# Patient Record
Sex: MALE | Race: WHITE | Employment: OTHER | URBAN - METROPOLITAN AREA
[De-identification: names, ages, dates, MRNs, and addresses within clinical notes are randomized per-mention and may not be internally consistent; named-entity substitution may affect disease eponyms.]

---

## 2020-09-19 ENCOUNTER — HOSPITAL ENCOUNTER (OUTPATIENT)
Dept: GENERAL RADIOLOGY | Age: 80
Discharge: HOME | End: 2020-09-19

## 2020-09-19 ENCOUNTER — HOSPITAL ENCOUNTER (OUTPATIENT)
Age: 80
Discharge: HOME | End: 2020-09-19

## 2020-09-19 PROCEDURE — 71046 X-RAY EXAM CHEST 2 VIEWS: CPT

## 2020-09-25 ENCOUNTER — HOSPITAL ENCOUNTER (OUTPATIENT)
Dept: CT IMAGING | Age: 80
Discharge: HOME | End: 2020-09-25
Payer: COMMERCIAL

## 2020-09-25 LAB
GFR AFRICAN AMERICAN: >60
GFR NON-AFRICAN AMERICAN: >60
PERFORMED ON: NORMAL
POC CREATININE: 0.9 MG/DL (ref 0.8–1.3)
POC SAMPLE TYPE: NORMAL

## 2020-09-25 PROCEDURE — 6360000004 HC RX CONTRAST MEDICATION: Performed by: INTERNAL MEDICINE

## 2020-09-25 PROCEDURE — 93005 ELECTROCARDIOGRAM TRACING: CPT | Performed by: INTERNAL MEDICINE

## 2020-09-25 PROCEDURE — 71275 CT ANGIOGRAPHY CHEST: CPT

## 2020-09-25 PROCEDURE — 82565 ASSAY OF CREATININE: CPT

## 2020-09-25 RX ADMIN — IOPAMIDOL 80 ML: 755 INJECTION, SOLUTION INTRAVENOUS at 15:46

## 2020-09-26 LAB
EKG ATRIAL RATE: 58 BPM
EKG DIAGNOSIS: NORMAL
EKG P AXIS: 57 DEGREES
EKG P-R INTERVAL: 232 MS
EKG Q-T INTERVAL: 454 MS
EKG QRS DURATION: 92 MS
EKG QTC CALCULATION (BAZETT): 445 MS
EKG R AXIS: 12 DEGREES
EKG T AXIS: 25 DEGREES
EKG VENTRICULAR RATE: 58 BPM

## 2020-09-26 PROCEDURE — 93010 ELECTROCARDIOGRAM REPORT: CPT | Performed by: INTERNAL MEDICINE

## 2020-09-29 ENCOUNTER — TELEPHONE (OUTPATIENT)
Dept: PULMONOLOGY | Age: 80
End: 2020-09-29

## 2020-10-01 ENCOUNTER — OFFICE VISIT (OUTPATIENT)
Dept: PULMONOLOGY | Age: 80
End: 2020-10-01
Payer: COMMERCIAL

## 2020-10-01 ENCOUNTER — TELEPHONE (OUTPATIENT)
Dept: PULMONOLOGY | Age: 80
End: 2020-10-01

## 2020-10-01 VITALS
RESPIRATION RATE: 16 BRPM | HEART RATE: 64 BPM | TEMPERATURE: 98.2 F | HEIGHT: 66 IN | WEIGHT: 212 LBS | SYSTOLIC BLOOD PRESSURE: 102 MMHG | BODY MASS INDEX: 34.07 KG/M2 | OXYGEN SATURATION: 95 % | DIASTOLIC BLOOD PRESSURE: 57 MMHG

## 2020-10-01 PROCEDURE — 99205 OFFICE O/P NEW HI 60 MIN: CPT | Performed by: INTERNAL MEDICINE

## 2020-10-01 RX ORDER — AZELASTINE 1 MG/ML
2 SPRAY, METERED NASAL 2 TIMES DAILY
Qty: 1 BOTTLE | Refills: 0 | Status: SHIPPED | OUTPATIENT
Start: 2020-10-01 | End: 2021-02-15 | Stop reason: SDUPTHER

## 2020-10-01 RX ORDER — CHOLESTYRAMINE 4 G/9G
1 POWDER, FOR SUSPENSION ORAL
COMMUNITY

## 2020-10-01 RX ORDER — GABAPENTIN 300 MG/1
600 CAPSULE ORAL 2 TIMES DAILY
Qty: 60 CAPSULE | Refills: 0 | Status: SHIPPED | OUTPATIENT
Start: 2020-10-01 | End: 2020-10-29 | Stop reason: ALTCHOICE

## 2020-10-01 RX ORDER — EZETIMIBE 10 MG/1
1 TABLET ORAL DAILY
COMMUNITY
End: 2022-03-11 | Stop reason: SDUPTHER

## 2020-10-01 RX ORDER — ASPIRIN 81 MG
100 TABLET, DELAYED RELEASE (ENTERIC COATED) ORAL 2 TIMES DAILY
COMMUNITY
Start: 2020-07-11 | End: 2020-10-01 | Stop reason: CLARIF

## 2020-10-01 RX ORDER — CLOPIDOGREL BISULFATE 75 MG/1
1 TABLET ORAL DAILY
COMMUNITY

## 2020-10-01 RX ORDER — ASPIRIN 81 MG/1
162 TABLET ORAL DAILY
COMMUNITY

## 2020-10-01 RX ORDER — ASPIRIN 325 MG
325 TABLET ORAL
COMMUNITY
End: 2020-10-01 | Stop reason: CLARIF

## 2020-10-01 ASSESSMENT — ENCOUNTER SYMPTOMS
CHEST TIGHTNESS: 0
STRIDOR: 0
CHOKING: 0
CONSTIPATION: 0
SORE THROAT: 0
ABDOMINAL PAIN: 0
EYE PAIN: 0
DIARRHEA: 0
COUGH: 1
EYE DISCHARGE: 0
VOICE CHANGE: 0
EYE ITCHING: 0

## 2020-10-01 NOTE — TELEPHONE ENCOUNTER
Within this Telehealth Consent, the terms you and yours refer to the person using the Telehealth Service (Service), or in the case of a use of the Service by or on behalf of a minor, you and yours refer to and include (i) the parent or legal guardian who provides consent to the use of the Service by such minor or uses the Service on behalf of such minor, and (ii) the minor for whom consent is being provided or on whose behalf the Service is being utilized. When using Service, you will be consulting with your health care providers via the use of Telehealth.   Telehealth involves the delivery of healthcare services using electronic communications, information technology or other means between a healthcare provider and a patient who are not in the same physical location. Telehealth may be used for diagnosis, treatment, follow-up and/or patient education, and may include, but is not limited to, one or more of the following:    Electronic transmission of medical records, photo images, personal health information or other data between a patient and a healthcare provider    Interactions between a patient and healthcare provider via audio, video and/or data communications    Use of output data from medical devices, sound and video files    Anticipated Benefits   The use of Telehealth by your Provider(s) through the Service may have the following possible benefits:    Making it easier and more efficient for you to access medical care and treatment for the conditions treated by such Provider(s) utilizing the Service    Allowing you to obtain medical care and treatment by Provider(s) at times that are convenient for you    Enabling you to interact with Provider(s) without the necessity of an in-office appointment     Possible Risks   While the use of Telehealth can provide potential benefits for you, there are also potential risks associated with the use of Telehealth.  These risks include, but may not be limited to the following:    Your Provider(s) may not able to provide medical treatment for your particular condition and you may be required to seek alternative healthcare or emergency care services.  The electronic systems or other security protocols or safeguards used in the Service could fail, causing a breach of privacy of your medical or other information.  Given regulatory requirements in certain jurisdictions, your Provider(s) diagnosis and/or treatment options, especially pertaining to certain prescriptions, may be limited. Acceptance   1. You understand that Services will be provided via Telehealth. This process involves the use of HIPAA compliant and secure, real-time audio-visual interfacing with a qualified and appropriately trained provider located at [facility]. 2. You understand that, under no circumstances, will this session be recorded. 3. You understand that the Provider(s) at [facility] and other clinical participants will be party to the information obtained during the Telehealth session in accordance with best medical practices. 4. You understand that the information obtained during the Telehealth session will be used to help determine the most appropriate treatment options. 5. You understand that You have the right to revoke this consent at any point in time. 6. You understand that Telehealth is voluntary, and that continued treatment is not dependent upon consent. 7. You understand that, in the event of non-consent to Telehealth services and/or technical difficulties, you will obtain services as typically provided in the absence of Telehealth technology. 8. You understand that this consent will be kept in Your medical record. 9. No potential benefits from the use of Telehealth or specific results can be guaranteed. Your condition may not be cured or improved and, in some cases, may get worse.    10. There are limitations in the provision of medical care and treatment via Telehealth and the Service and you may not be able to receive diagnosis and/or treatment through the Service for every condition for which you seek diagnosis and/or treatment. 11. There are potential risks to the use of Telehealth, including but not limited to the risks described in this Telehealth Consent. 12. Your Provider(s) have discussed the use of Telehealth and the Service with you, including the benefits and risks of such and you have provided oral consent to your Provider(s) for the use of Telehealth and the Service. 15. You understand that it is your duty to provide your Provider(s) truthful, accurate and complete information, including all relevant information regarding care that you may have received or may be receiving from other healthcare providers outside of the Service. 14. You understand that each of your Provider(s) may determine in his or sole discretion that your condition is not suitable for diagnosis and/or treatment using the Service, and that you may need to seek medical care and treatment a specialist or other healthcare provider, outside of the Service. 15. You understand that you are fully responsible for payment for all services provided by Provider(s) or through use of the Service and that you may not be able to use third-party insurance. 16. You represent that (a) you have read this Telehealth Consent carefully, (b) you understand the risks and benefits of the Service and the use of Telehealth in the medical care and treatment provided to you by Provider(s) using the Service, and (c) you have the legal capacity and authority to provide this consent for yourself and/or the minor for which you are consenting under applicable federal and state laws, including laws relating to the age of [de-identified] and/or parental/guardian consent.    17. You give your informed consent to the use of Telehealth by Provider(s) using the Service under the terms described in the Terms of Service and this Telehealth Consent. The patient was read the following statement and has consented to the visit as of 10/1/20. The patient has been scheduled for their first telehealth visit on 10/29/20 with Dr. Abdullahi Lees.

## 2020-10-01 NOTE — PATIENT INSTRUCTIONS
Remember to bring all pulmonary medications to your next appointment with the office. Please keep all of your future appointments scheduled by [address], [facility] office. Out of respect for other patients and providers, you may be asked to reschedule your appointment if you arrive later than your scheduled appointment time. Appointments cancelled less than 24hrs in advance will be considered a no show. Patients with three missed appointments within 1 year or four missed appointments within 2 years can be dismissed from the practice. You may receive a survey regarding the care you received during your visit. Your input is valuable to us. We encourage you to complete and return your survey. We hope you will choose us in the future for your healthcare needs.

## 2020-10-01 NOTE — PROGRESS NOTES
MA Communication: The following orders are received by verbal communication from Dr. Jyoti Beach.     Orders include:  MBS scheduled 10/27/20 @ 9:30 (9 am arrival        VV scheduled 10/29/20

## 2020-10-01 NOTE — PROGRESS NOTES
Pulmonary Outpatient Note   Piper Alcantara MD       10/1/2020    Chief Complaint:  Cough (New patient)     HPI:   68y.o. year old male here for further eval of a cough. For nearly 3 months he has had a daily cough, usually triggered with deep inspiration such as when talking or exertion. He denies congestion, post nasal drip. Will occasionally have clear nasal discharge with his coughing fits. Symptoms started after surgical treatment of lung cancer (right upper lobectomy). Tried on tessalon, albuterol MDI, PPI without relief. Denies acid reflux/GERD complaints. Denies wheezing or prior history of asthma     There was concern about medication effect. Had his ace inhibitor stopped. Also was evaluated by oncology recently (Dr. Shyam Reynolds) and there was concern about chemo induced adverse effects or pneumonitis     He had CT imaging from [facility] I reviewed showing basilar atelectasis. Had a repeat CT chest recently here I also personally reviewed which shows the same.  Post op changes in right lung from above     Past Medical History:   Diagnosis Date    Asthma     CAD (coronary artery disease)     Hypertension     Lung cancer ([address]        Past Surgical History:   Procedure Laterality Date    CHOLECYSTECTOMY      CORONARY ARTERY BYPASS GRAFT      DIAGNOSTIC CARDIAC CATH LAB PROCEDURE      ERCP      LUNG CANCER SURGERY      PTCA      numerous       Social History     Tobacco Use    Smoking status: Never Smoker    Smokeless tobacco: Never Used   Substance Use Topics    Alcohol use: Yes     Comment: socially          Family History   Problem Relation Age of Onset    Heart Failure Father          Current Outpatient Medications:     Acetaminophen (TYLENOL) 325 MG CAPS, Take 975 mg by mouth every 8 hours, Disp: , Rfl:     Polyvinyl Alcohol-Povidone (REFRESH OP), Apply 2 drops to eye, Disp: , Rfl:     clopidogrel (PLAVIX) 75 MG tablet, Take 1 tablet by mouth daily, Disp: , Rfl:     ezetimibe (ZETIA) 10 MG tablet, Take 1 tablet by mouth daily, Disp: , Rfl:     Multiple Vitamins-Minerals (MULTIVITAMIN ADULT PO), Take 1 tablet by mouth daily, Disp: , Rfl:     Polyethylene Glycol 3350 (GLYCOLAX PO), Take 17 g by mouth, Disp: , Rfl:     aspirin 81 MG EC tablet, Take 162 mg by mouth daily, Disp: , Rfl:     Rosuvastatin Calcium (CRESTOR PO), Take by mouth daily, Disp: , Rfl:     Omega-3 Fatty Acids (OMEGA 3 PO), Take by mouth daily, Disp: , Rfl:     cholestyramine (QUESTRAN) 4 g packet, Take 1 packet by mouth 3 times daily (with meals), Disp: , Rfl:     LISINOPRIL PO, Take by mouth, Disp: , Rfl:     METOPROLOL SUCCINATE PO, Take by mouth daily, Disp: , Rfl:     LORazepam (ATIVAN PO), Take by mouth as needed, Disp: , Rfl:     ALPRAZolam (XANAX PO), Take by mouth as needed, Disp: , Rfl:     HYDROCODONE-ACETAMINOPHEN PO, Take by mouth as needed, Disp: , Rfl:     azelastine (ASTELIN) 0.1 % nasal spray, 2 sprays by Nasal route 2 times daily Use in each nostril as directed, Disp: 1 Bottle, Rfl: 0    gabapentin (NEURONTIN) 300 MG capsule, Take 2 capsules by mouth 2 times daily for 15 days. Intended supply: 30 days, Disp: 60 capsule, Rfl: 0    Amlodipine; Phenacetin; Atorvastatin; and Tetracyclines & related    Vitals:    10/01/20 0937   BP: (!) 102/57   Site: Left Upper Arm   Position: Sitting   Cuff Size: Medium Adult   Pulse: 64   Resp: 16   Temp: 98.2 °F (36.8 °C)   TempSrc: Oral   SpO2: 95%   Weight: 212 lb (96.2 kg)   Height: 5' 6\" (1.676 m)       Review of Systems   Constitutional: Negative for chills, fever and unexpected weight change. HENT: Negative for mouth sores, sore throat and voice change. Eyes: Negative for pain, discharge and itching. Respiratory: Positive for cough. Negative for choking, chest tightness and stridor. Cardiovascular: Negative for chest pain, palpitations and leg swelling. Gastrointestinal: Negative for abdominal pain, constipation and diarrhea.    Endocrine: visit in 2 weeks       Orders Placed This Encounter   Procedures    [address] [place] [address]       Jeremie Bryson MD    I spent over 60 mins of face to face time involved with this patient over 50% of that time was devoted to disease management and counseling.  We discussed his cough and the above treatment options

## 2020-10-19 RX ORDER — GABAPENTIN 300 MG/1
CAPSULE ORAL
Qty: 60 CAPSULE | Refills: 0 | OUTPATIENT
Start: 2020-10-19

## 2020-10-27 ENCOUNTER — PATIENT MESSAGE (OUTPATIENT)
Dept: PULMONOLOGY | Age: 80
End: 2020-10-27

## 2020-10-27 ENCOUNTER — HOSPITAL ENCOUNTER (OUTPATIENT)
Dept: GENERAL RADIOLOGY | Age: 80
Discharge: HOME | End: 2020-10-27
Payer: MEDICARE

## 2020-10-29 ENCOUNTER — VIRTUAL VISIT (OUTPATIENT)
Dept: PULMONOLOGY | Age: 80
End: 2020-10-29
Payer: COMMERCIAL

## 2020-10-29 PROCEDURE — 99443 PR PHYS/QHP TELEPHONE EVALUATION 21-30 MIN: CPT | Performed by: INTERNAL MEDICINE

## 2020-10-29 RX ORDER — METFORMIN HYDROCHLORIDE 750 MG/1
750 TABLET, EXTENDED RELEASE ORAL EVERY 12 HOURS
COMMUNITY
End: 2022-03-11 | Stop reason: SDUPTHER

## 2020-10-29 RX ORDER — OSIMERTINIB 80 1/1
TABLET, FILM COATED ORAL
COMMUNITY

## 2020-10-29 RX ORDER — GABAPENTIN 300 MG/1
600 CAPSULE ORAL 2 TIMES DAILY
Qty: 120 CAPSULE | Refills: 4 | Status: SHIPPED | OUTPATIENT
Start: 2020-10-29 | End: 2022-07-06

## 2020-10-29 ASSESSMENT — ENCOUNTER SYMPTOMS
EYE ITCHING: 0
COUGH: 1
EYE DISCHARGE: 0
VOICE CHANGE: 0
EYE PAIN: 0
SORE THROAT: 0
CHOKING: 0
CHEST TIGHTNESS: 0
ABDOMINAL PAIN: 0
DIARRHEA: 0
CONSTIPATION: 0
STRIDOR: 0

## 2020-10-29 NOTE — PATIENT INSTRUCTIONS
Remember to bring a list of pulmonary medications and any CPAP or BiPAP machines to your next appointment with the office. Please keep all of your future appointments scheduled by [facility] office. Out of respect for other patients and providers, you may be asked to reschedule your appointment if you arrive later than your scheduled appointment time. Appointments cancelled less than 24hrs in advance will be considered a no show. Patients with three missed appointments within 1 year or four missed appointments within 2 years can be dismissed from the practice. You may receive a survey regarding the care you received during your visit. Your input is valuable to us. We encourage you to complete and return your survey. We hope you will choose us in the future for your healthcare needs. Pt instructed of all future appointment dates & times, including radiology, labs, procedures & referrals. If procedures were scheduled preparation instructions provided. Instructions on future appointments with [facility] were given.

## 2020-10-29 NOTE — PROGRESS NOTES
Pulmonary Outpatient Note   Max Tobias MD       10/29/2020    Chief Complaint:  Follow-up and Results ([facility])     HPI:   68y.o. year old male here for chronic cough. televisit    Persistent cough  Some relief with gabapentin but ran out of the script  Had not tried the nasal spray  Seen by speech therapy, no evidence of aspiration. Past Medical History:   Diagnosis Date    Asthma     CAD (coronary artery disease)     Hypertension     Lung cancer ([address]        Past Surgical History:   Procedure Laterality Date    CHOLECYSTECTOMY      CORONARY ARTERY BYPASS GRAFT      DIAGNOSTIC CARDIAC CATH LAB PROCEDURE      ERCP      LUNG CANCER SURGERY      PTCA      numerous       Social History     Tobacco Use    Smoking status: Never Smoker    Smokeless tobacco: Never Used   Substance Use Topics    Alcohol use: Yes     Comment: socially       Family History   Problem Relation Age of Onset    Heart Failure Father          Current Outpatient Medications:     metFORMIN (GLUCOPHAGE-XR) 750 MG extended release tablet, Take 750 mg by mouth every 12 hours, Disp: , Rfl:     Osimertinib Mesylate (TAGRISSO) 80 MG TABS, Take by mouth, Disp: , Rfl:     gabapentin (NEURONTIN) 300 MG capsule, Take 2 capsules by mouth 2 times daily for 150 days.  Intended supply: 30 days, Disp: 120 capsule, Rfl: 4    Acetaminophen (TYLENOL) 325 MG CAPS, Take 975 mg by mouth every 8 hours, Disp: , Rfl:     Polyvinyl Alcohol-Povidone (REFRESH OP), Apply 2 drops to eye, Disp: , Rfl:     clopidogrel (PLAVIX) 75 MG tablet, Take 1 tablet by mouth daily, Disp: , Rfl:     ezetimibe (ZETIA) 10 MG tablet, Take 1 tablet by mouth daily, Disp: , Rfl:     Multiple Vitamins-Minerals (MULTIVITAMIN ADULT PO), Take 1 tablet by mouth daily, Disp: , Rfl:     Polyethylene Glycol 3350 (GLYCOLAX PO), Take 17 g by mouth, Disp: , Rfl:     aspirin 81 MG EC tablet, Take 162 mg by mouth daily, Disp: , Rfl:     Rosuvastatin Calcium (CRESTOR PO), Take by mouth daily, Disp: , Rfl:     Omega-3 Fatty Acids (OMEGA 3 PO), Take by mouth daily, Disp: , Rfl:     cholestyramine (QUESTRAN) 4 g packet, Take 1 packet by mouth 3 times daily (with meals), Disp: , Rfl:     LISINOPRIL PO, Take by mouth, Disp: , Rfl:     METOPROLOL SUCCINATE PO, Take by mouth daily, Disp: , Rfl:     LORazepam (ATIVAN PO), Take by mouth as needed, Disp: , Rfl:     ALPRAZolam (XANAX PO), Take by mouth as needed, Disp: , Rfl:     HYDROCODONE-ACETAMINOPHEN PO, Take by mouth as needed, Disp: , Rfl:     azelastine (ASTELIN) 0.1 % nasal spray, 2 sprays by Nasal route 2 times daily Use in each nostril as directed (Patient not taking: Reported on 10/29/2020), Disp: 1 Bottle, Rfl: 0    Amlodipine; Phenacetin; Atorvastatin; and Tetracyclines & related    There were no vitals filed for this visit. Review of Systems   Constitutional: Negative for chills, fever and unexpected weight change. HENT: Negative for mouth sores, sore throat and voice change. Eyes: Negative for pain, discharge and itching. Respiratory: Positive for cough. Negative for choking, chest tightness and stridor. Cardiovascular: Negative for chest pain, palpitations and leg swelling. Gastrointestinal: Negative for abdominal pain, constipation and diarrhea. Endocrine: Negative for cold intolerance, heat intolerance and polydipsia. Genitourinary: Negative for dysuria, frequency and hematuria. Musculoskeletal: Negative for gait problem, joint swelling and neck stiffness. Neurological: Negative for dizziness, numbness and headaches. Psychiatric/Behavioral: Negative for agitation, confusion and hallucinations. ASSESSMENT:    1. Chronic cough      PLAN:    -Continue gabapentin, monitor for adverse effects  -He was interested in speech therapy for chronic cough, will send referral   -Still doubtful that this is pneumonitis or ILD from medications, ok to continue them from my standpoint.  He follows with  [facility]/Oncology and will be seeing   -Follow up in 4-6 weeks     Orders Placed This Encounter   Procedures   Jhoan Bella MD    Catarina Poon is a 68 y.o. male being evaluated by a Virtual Visit (video visit) encounter to address concerns as mentioned above. A caregiver was present when appropriate. Due to this being a TeleHealth encounter ([facility]), evaluation of the following organ systems was limited: Vitals/Constitutional/EENT/Resp/CV/GI//MS/Neuro/Skin/Heme-Lymph-Imm. Pursuant to the emergency declaration under the [address] [place] [address], [address] [place] [address] authority and the [employer] Act, this Virtual Visit was conducted with patient's (and/or legal guardian's) consent, to reduce the patient's risk of exposure to COVID-19 and provide necessary medical care. The patient (and/or legal guardian) has also been advised to contact this office for worsening conditions or problems, and seek emergency medical treatment and/or call 911 if deemed necessary. Patient identification was verified at the start of the visit: Yes    Total time spent for this encounter: 30 mins    Services were provided through a video synchronous discussion virtually to substitute for in-person clinic visit. Patient and provider were located at their individual homes. --Soy Beltran MD on 10/29/2020 at 1:36 PM    An electronic signature was used to authenticate this note.

## 2020-11-04 ENCOUNTER — HOSPITAL ENCOUNTER (OUTPATIENT)
Dept: SPEECH THERAPY | Age: 80
Setting detail: THERAPIES SERIES
Discharge: HOME | End: 2020-11-04
Payer: COMMERCIAL

## 2020-11-04 PROCEDURE — 92610 EVALUATE SWALLOWING FUNCTION: CPT

## 2020-11-04 PROCEDURE — 92526 ORAL FUNCTION THERAPY: CPT

## 2020-11-04 NOTE — PROGRESS NOTES
[facility] - Swallowing      Patient: Ami Steven  : 1940  MRN: [id number]  Date: 2020  Electronically Signed by: Stefanie Johnson MA, CCC-SLP       Note: In Levels 3-5, some patients may meet only one of the \"and/or\" criteria listed. If you have difficulty deciding on the most appropriate level for an individual, use dietary level as the most important criterion if the dietary level is the result of swallow function rather than dentition only. Dietary levels at [facility] 6 and 7 should be judged only on swallow function, and any influence of poor dentition should be disregarded. []  Level 1 Individual is not able to swallow anything safely by mouth. All nutrition and hydration is received through non-oral means (e.g., nasogastric tube, PEG)    []  Level 2 Individual is not able to swallow safely by mouth for nutrition and hydration, but may take some consistency with consistent maximal cues in therapy only. Alternative method of feeding required    []  Level 3  Alternative method of feeding required as individual takes less than 50% of nutrition and hydration by mouth, and/or swallowing is safe with consistent use of moderate cues to use compensatory strategies and/or requires maximum diet restriction    []  Level 4 Swallowing is safe, but usually requires moderate cues to use compensatory strategies, and/or the individual has moderate diet restrictions and /or still requires tube feeding and/or oral supplements    []  Level 5 Swallowing is safe with minimal diet restriction and/or occasionally requires minimal cueing to use compensatory strategies. The individual may occasionally self-cue. All nutrition and hydration needs are met by mouth at mealtime    [x]  Level 6 Swallowing is safe, and the individual eats and drinks independently and may rarely require minimal cueing. The individual usually self-cues when difficulty occurs.   May need to avoid specific food items (e.g., popcorn and nuts), or require additional time (due to dysphasia)    []  Level 7 The individual's ability to eat independently is not limited by swallow function. Swallowing would be safe and efficient for all consistencies. Compensatory strategies are effectively used when needed       Diet levels/restrictions are defined on next page. Please use levels as a guide in scoring this FCM                                        Swallowing: Dietary Levels/Restrictions  · Maximum Restrictions: Diet is two or more levels below a regular diet status and liquid consistency  · Moderate Restrictions: Diet is two or more levels below a regular diet status in either solid or liquid consistency (but not both), OR diet is one level below in both solid and liquid consistency   · Minimum Restrictions: Diet is one level below a regular diet status in solid or liquid consistency    Solids  · Regular: No restrictions  · Reduced One Level: Meats are cooked until soft, with not tough or stringy foods. Might include meats like meat loaf, baked fish, and soft chicken. Vegetables are cooked soft  · Reduced Two Levels: Meats are chopped or ground. Vegetables are one consistency (e.g., souffle, baked potato) or are mashed with a fork  · Reduced Three Levels: Meats and vegetables are pureed    Liquids  · Regular: Thin liquids;  No restrictions  · Reduced One Level: Nectar, syrup; mildly thick    · Reduced Two Levels: Honey; moderately thick  · Reduced Three Levels: Pudding; extra thick

## 2020-11-04 NOTE — PROGRESS NOTES
Speech Language Pathology  Facility/Department: [facility] SPEECH THERAPY   CLINICAL BEDSIDE SWALLOW EVALUATION    NAME: Dank Dennis  : 1940  MRN: [id number]    ADMISSION DATE: 2020    Visit Diagnoses       Codes    Dysphagia, unspecified type    -  Primary R13.10    Chronic cough     R05        Onset Date: 20    Past Medical History:  has a past medical history of Asthma, CAD (coronary artery disease), Hypertension, and Lung cancer ([address] Past Surgical History:  has a past surgical history that includes Percutaneous Transluminal Coronary Angio; Coronary artery bypass graft; Diagnostic Cardiac Cath Lab Procedure; Cholecystectomy; Lung cancer surgery; and ERCP. Plan of Care Submitted: (y/n): Faxed    Date of Eval: 2020  Evaluating Therapist: Anjelica Oconnor    Current Diet level:  Current Diet : Regular  Current Liquid Diet : Thin    Primary Complaint:   Patient Complaint: Chronic cough that increases with eating, drinking, talking or physical exercise. Pain: There was no reported instances of pain during the session    Reason for Referral  Dank Dennis was referred for a bedside swallow evaluation to assess the efficiency of his swallow function, identify signs and symptoms of aspiration, and make recommendations regarding safe dietary consistencies, effective compensatory strategies, and safe eating environment. Impression  Dysphagia Diagnosis  Dysphagia Diagnosis: Mild pharyngeal stage dysphagia  Dysphagia Outcome Severity Scale: Level 5: Mild dysphagia- Distant supervision. May need one diet consistency restricted   The pt presents with a history of chronic cough reportedly since his surgery to remove lung cancer on 20. The reports that this coughing occurs more frequently when eating, drinking, talking or performing any exercise. This coughing was noted throughout the session. He was found to have some pharyngeal dysphagia per a recent MBS on 10/27/20.  However, there was no aspiration or penetration to explain the pt's coughing. The pt rated himself as a 25 on the Cough Severity Index (CSI) out of a highest possible of 40. In addition to the cough, the pt also reports some general discomfort when breathing deeply. He also reports that he feels like his ability to fully inhale and exhale is not the same as it used to be prior to this surgery. Today's swallowing evaluation revealed reduced laryngeal elevation and symptoms of some possible premature pharyngeal entry. However, this does not account for the pt's chronic cough. At this time treatment to focus on respiratory retraining, along with swallowing exercises, to reduce the pt's chronic cough is recommended. Likewise, use of an Inspiratory Muscle Strength Trainer (IMST) is recommended to further target the pt's symptoms. See the rest of this report for more details. Treatment Plan  Requires SLP Intervention: Yes     Duration/Frequency of Treatment: Speech Therapy 2 x week for dysphagia treatment and treatment to eliminate chronic cough  D/C Recommendations: Outpatient    Recommended Diet and Intervention  Solid: Diet Solids Recommendation: Regular  Liquid: Liquid Consistency Recommendation: Thin  Medication:Recommended Form of Meds: Whole with water  Therapeutic Interventions: Patient/Family education, Diet tolerance monitoring, Pharyngeal exercises, Laryngeal exercises    Compensatory Swallowing Strategies Attempted  Compensatory Swallowing Strategies: Upright as possible for all oral intake; Alternate solids and liquids; Remain upright for 30-45 minutes after meals;Small bites/sips;Eat/Feed slowly    Treatment/Goals  Short-term Goals  Goal 1: The pt will demonstrate coughing only 2 or < instances in 3 consecutive 45 minute treatment sessions  Goal 2: The pt will tolerate all solids and liquids without significant dysphagia symptoms 10/10  Long-term Goals  Goal 1: The pt no longer have any chronic cough or dysphagia [x]NO  []YES    Preferred Language for Healthcare: [x]English []Other:    [x]Patient history, allergies, meds reviewed. Medical chart reviewed. See intake form. Review of Symptoms (ROS):  [x]Performed Review of Symptoms (Integumentary, Cardiopulmonary, Neurological) by intake and observation. Intake form has been scanned into medical record. Patient has been instructed to contact their primary care physician regarding ROS issues if not already being addressed at this time.      Therapy Time  SLP Individual Minutes  Time In: 1633  Time Out: [address]  Minutes: [address] [facility], [address] [place] [address]  OD[id number]  Speech-Language Pathologist  Phone #: [phone]  Fax #: [phone]    11/4/2020 6:16 PM

## 2020-11-04 NOTE — PLAN OF CARE
The following patient has been evaluated for therapy services. Please review the attached summary of the patient's plan of care, and verify that you agree with plan for additional therapy services at this time. Thank you for the referral of this patient. Please sign the attached certification form. Physician signature_______________________ Date________________      Fax to: [facility] [phone]         Outpatient Speech Therapy  Phone: [phone] Fax: [phone]     To: Bridget Serrano MD      Patient: Dariela Perez  : 1940  MRN: [id number]  Evaluation Date: 2020      Diagnosis Information:  Dysphagia, unspecified type    -  Primary R13.10    Chronic cough RO5         Speech Therapy Certification Form    Plan of Care/Treatment to date:  [] Speech-Language Evaluation/Treatment    [x] Dysphagia Evaluation/Treatment        [] Dysphagia Treatment via Neuromuscular Electrical Stimulation (NMES)   [] Modified Barium Swallowing Study   [] Fiberoptic Endoscopic Evaluation of Swallowing (FEES)   [] Cognitive-Linguistic Skills Development  [] Voice evaluation and Treatment      [] Evaluation, modification, and Training of Voice Prosthetic     [] Evaluation for Speech-Generating Augmentative and Alternative Communication Device   [] Therapeutic Services for the use of Speech-Generating Device.    [x] Other: Respiratory Retraining to reduce Chronic Cough   [x] Other: Use of Inspiratory Muscle Strength Trainer (IMST) and later possible Expiratory Muscle Strength Trainer (EMST)         Frequency/Duration:  # Days per week: [] 1 day # Weeks: [] 1 week [] 5 weeks      [x] 2 days   [] 2 weeks [x] 6 weeks     [] 3 days   [] 3 weeks [] 7 weeks     [] 4 days   [] 4 weeks [] 8 weeks    Rehab Potential: [] Excellent [x] Good [] Fair  [] Poor       Electronically signed by: NESTOR Carbajal  Phone: [phone]  Fax: [phone]    If you have any questions or concerns, please don't hesitate to call.   Thank you for your referral.

## 2020-11-10 ENCOUNTER — HOSPITAL ENCOUNTER (OUTPATIENT)
Dept: SPEECH THERAPY | Age: 80
Setting detail: THERAPIES SERIES
Discharge: HOME | End: 2020-11-10
Payer: COMMERCIAL

## 2020-11-11 ENCOUNTER — APPOINTMENT (OUTPATIENT)
Dept: SPEECH THERAPY | Age: 80
End: 2020-11-11
Payer: COMMERCIAL

## 2020-11-17 ENCOUNTER — APPOINTMENT (OUTPATIENT)
Dept: SPEECH THERAPY | Age: 80
End: 2020-11-17
Payer: COMMERCIAL

## 2020-11-18 NOTE — TELEPHONE ENCOUNTER
From: Gladis Lin  To: Kandy Carr MD  Sent: 10/27/2020 8:47 PM EDT  Subject: Test Results Question    Could the hospital send /mail me a copy of results of (1) CT angio of chest (2) chest x-ray (3) barium swallow? Could you call in to [facility] a prescription for gabapentin? The Speech Pathologist at the [facility]allow said that I should consider Speech Therapy cough evaluation and treatment by samanta Francis in [address] [place] [address] Drive, [address]. What do you think about this suggestion?     Sameer Leblanc MD

## 2020-11-18 NOTE — TELEPHONE ENCOUNTER
This is an old message the patient sent,   Can you follow up with Dr. Vince Ford and find out if he received the gabapentin and set up speech referral?  All of this was taken care of at the previous virtual visit.    Also ask John Hernandez why there was a 20 day delay in sending this message to us???

## 2020-11-23 ENCOUNTER — APPOINTMENT (OUTPATIENT)
Dept: SPEECH THERAPY | Age: 80
End: 2020-11-23
Payer: COMMERCIAL

## 2020-11-25 ENCOUNTER — APPOINTMENT (OUTPATIENT)
Dept: SPEECH THERAPY | Age: 80
End: 2020-11-25
Payer: COMMERCIAL

## 2020-12-16 ENCOUNTER — VIRTUAL VISIT (OUTPATIENT)
Dept: PULMONOLOGY | Age: 80
End: 2020-12-16
Payer: COMMERCIAL

## 2020-12-16 ENCOUNTER — TELEPHONE (OUTPATIENT)
Dept: PULMONOLOGY | Age: 80
End: 2020-12-16

## 2020-12-16 PROCEDURE — 99213 OFFICE O/P EST LOW 20 MIN: CPT | Performed by: INTERNAL MEDICINE

## 2020-12-16 ASSESSMENT — ENCOUNTER SYMPTOMS
CHEST TIGHTNESS: 0
SORE THROAT: 0
DIARRHEA: 0
EYE PAIN: 0
STRIDOR: 0
CONSTIPATION: 0
ABDOMINAL PAIN: 0
EYE ITCHING: 0
CHOKING: 0
COUGH: 1
EYE DISCHARGE: 0
VOICE CHANGE: 0

## 2020-12-16 NOTE — PROGRESS NOTES
Pulmonary Outpatient Note   Kat Mullen MD       12/16/2020    Chief Complaint:  Follow-up (speech therapy eval has not been back)     HPI:   68y.o. year old male here for chronic cough. Virtual visit through [facility]    Taking gabepentin  Has improvement in his cough  CT chest scheduled for Saturday  No other complaints  Planning to get COVID vaccine     Past Medical History:   Diagnosis Date    Asthma     CAD (coronary artery disease)     Hypertension     Lung cancer ([address]        Past Surgical History:   Procedure Laterality Date    CHOLECYSTECTOMY      CORONARY ARTERY BYPASS GRAFT      DIAGNOSTIC CARDIAC CATH LAB PROCEDURE      ERCP      LUNG CANCER SURGERY      PTCA      numerous       Social History     Tobacco Use    Smoking status: Never Smoker    Smokeless tobacco: Never Used   Substance Use Topics    Alcohol use: Yes     Comment: socially       Family History   Problem Relation Age of Onset    Heart Failure Father          Current Outpatient Medications:     metFORMIN (GLUCOPHAGE-XR) 750 MG extended release tablet, Take 750 mg by mouth every 12 hours, Disp: , Rfl:     Osimertinib Mesylate (TAGRISSO) 80 MG TABS, Take by mouth, Disp: , Rfl:     gabapentin (NEURONTIN) 300 MG capsule, Take 2 capsules by mouth 2 times daily for 150 days.  Intended supply: 30 days, Disp: 120 capsule, Rfl: 4    Acetaminophen (TYLENOL) 325 MG CAPS, Take 975 mg by mouth every 8 hours, Disp: , Rfl:     clopidogrel (PLAVIX) 75 MG tablet, Take 1 tablet by mouth daily, Disp: , Rfl:     ezetimibe (ZETIA) 10 MG tablet, Take 1 tablet by mouth daily, Disp: , Rfl:     Multiple Vitamins-Minerals (MULTIVITAMIN ADULT PO), Take 1 tablet by mouth daily, Disp: , Rfl:     Polyethylene Glycol 3350 (GLYCOLAX PO), Take 17 g by mouth, Disp: , Rfl:     aspirin 81 MG EC tablet, Take 162 mg by mouth daily, Disp: , Rfl:     Rosuvastatin Calcium (CRESTOR PO), Take by mouth daily, Disp: , Rfl:   Omega-3 Fatty Acids (OMEGA 3 PO), Take by mouth daily, Disp: , Rfl:     cholestyramine (QUESTRAN) 4 g packet, Take 1 packet by mouth 3 times daily (with meals), Disp: , Rfl:     LISINOPRIL PO, Take by mouth, Disp: , Rfl:     METOPROLOL SUCCINATE PO, Take by mouth daily, Disp: , Rfl:     LORazepam (ATIVAN PO), Take by mouth as needed, Disp: , Rfl:     ALPRAZolam (XANAX PO), Take by mouth as needed, Disp: , Rfl:     HYDROCODONE-ACETAMINOPHEN PO, Take by mouth as needed, Disp: , Rfl:     azelastine (ASTELIN) 0.1 % nasal spray, 2 sprays by Nasal route 2 times daily Use in each nostril as directed, Disp: 1 Bottle, Rfl: 0    Amlodipine, Phenacetin, Atorvastatin, and Tetracyclines & related    There were no vitals filed for this visit. Review of Systems   Constitutional: Negative for chills, fever and unexpected weight change. HENT: Negative for mouth sores, sore throat and voice change. Eyes: Negative for pain, discharge and itching. Respiratory: Positive for cough. Negative for choking, chest tightness and stridor. Cardiovascular: Negative for chest pain, palpitations and leg swelling. Gastrointestinal: Negative for abdominal pain, constipation and diarrhea. Endocrine: Negative for cold intolerance, heat intolerance and polydipsia. Genitourinary: Negative for dysuria, frequency and hematuria. Musculoskeletal: Negative for gait problem, joint swelling and neck stiffness. Neurological: Negative for dizziness, numbness and headaches. Psychiatric/Behavioral: Negative for agitation, confusion and hallucinations. ASSESSMENT:    1.  Chronic cough      PLAN:    -Continue gabapentin  -Follow up on CT chest results to determine if further pulm workup needed   -Encouraged increased activity/regular exercise       Minnie Chin MD Dariela Perez is a 68 y.o. male being evaluated by a Virtual Visit (video visit) encounter to address concerns as mentioned above. A caregiver was present when appropriate. Due to this being a TeleHealth encounter (During AJO-56 public health emergency), evaluation of the following organ systems was limited: Vitals/Constitutional/EENT/Resp/CV/GI//MS/Neuro/Skin/Heme-Lymph-Imm. Pursuant to the emergency declaration under the [address] [place] [address] and the [employer] Act, this Virtual Visit was conducted with patient's (and/or legal guardian's) consent, to reduce the patient's risk of exposure to COVID-19 and provide necessary medical care. The patient (and/or legal guardian) has also been advised to contact this office for worsening conditions or problems, and seek emergency medical treatment and/or call 911 if deemed necessary. Patient identification was verified at the start of the visit: Yes    Total time spent for this encounter: Not billed by time    Services were provided through a video synchronous discussion virtually to substitute for in-person clinic visit. Patient and provider were located at their individual homes. --Missy Mccormick MD on 12/16/2020 at 3:05 PM    An electronic signature was used to authenticate this note.

## 2020-12-16 NOTE — PATIENT INSTRUCTIONS
Remember to bring a list of pulmonary medications and any CPAP or BiPAP machines to your next appointment with the office. Please keep all of your future appointments scheduled by [facility] office. Out of respect for other patients and providers, you may be asked to reschedule your appointment if you arrive later than your scheduled appointment time. Appointments cancelled less than 24hrs in advance will be considered a no show. Patients with three missed appointments within 1 year or four missed appointments within 2 years can be dismissed from the practice. You may receive a survey regarding the care you received during your visit. Your input is valuable to us. We encourage you to complete and return your survey. We hope you will choose us in the future for your healthcare needs. Pt instructed of all future appointment dates & times, including radiology, labs, procedures & referrals. If procedures were scheduled preparation instructions provided. Instructions on future appointments with [facility] Pul[facility] were given.

## 2021-01-04 NOTE — TELEPHONE ENCOUNTER
You sent me a message 2 weeks ago to check on this patient's CT but I do not see that it was resulted.  Did he have this test at another hospital?  Thanks

## 2021-01-05 NOTE — TELEPHONE ENCOUNTER
No you wanted to be reminded to look at it but then the pt. No showed for scan. I'll call him and see about rescheduling. Spoke with the pt. He forgot and will RS. Gave him the number to scheduling since this test was ordered by Dr. Yue Mcleod.

## 2021-01-08 ENCOUNTER — HOSPITAL ENCOUNTER (OUTPATIENT)
Dept: CT IMAGING | Age: 81
Discharge: HOME | End: 2021-01-08
Payer: COMMERCIAL

## 2021-01-08 DIAGNOSIS — C34.11 MALIGNANT NEOPLASM OF UPPER LOBE OF RIGHT LUNG (HCC): ICD-10-CM

## 2021-01-08 PROCEDURE — 6360000004 HC RX CONTRAST MEDICATION: Performed by: INTERNAL MEDICINE

## 2021-01-08 PROCEDURE — 71260 CT THORAX DX C+: CPT

## 2021-01-08 RX ADMIN — IOPAMIDOL 80 ML: 755 INJECTION, SOLUTION INTRAVENOUS at 08:18

## 2021-02-15 DIAGNOSIS — R05.8 UPPER AIRWAY COUGH SYNDROME: ICD-10-CM

## 2021-02-15 RX ORDER — AZELASTINE 1 MG/ML
2 SPRAY, METERED NASAL 2 TIMES DAILY
Qty: 1 BOTTLE | Refills: 0 | Status: SHIPPED | OUTPATIENT
Start: 2021-02-15 | End: 2021-04-26

## 2021-04-24 DIAGNOSIS — R05.8 UPPER AIRWAY COUGH SYNDROME: ICD-10-CM

## 2021-04-26 RX ORDER — AZELASTINE 1 MG/ML
SPRAY, METERED NASAL
Qty: 30 ML | Refills: 3 | Status: SHIPPED | OUTPATIENT
Start: 2021-04-26

## 2021-06-02 ENCOUNTER — HOSPITAL ENCOUNTER (OUTPATIENT)
Dept: CT IMAGING | Age: 81
Discharge: HOME | End: 2021-06-02
Payer: COMMERCIAL

## 2021-06-02 DIAGNOSIS — C34.11 MALIGNANT NEOPLASM OF UPPER LOBE OF RIGHT LUNG (HCC): ICD-10-CM

## 2021-06-02 PROCEDURE — 71260 CT THORAX DX C+: CPT

## 2021-06-02 PROCEDURE — 6360000004 HC RX CONTRAST MEDICATION: Performed by: INTERNAL MEDICINE

## 2021-06-02 PROCEDURE — 82565 ASSAY OF CREATININE: CPT

## 2021-06-02 RX ADMIN — IOHEXOL 50 ML: 240 INJECTION, SOLUTION INTRATHECAL; INTRAVASCULAR; INTRAVENOUS; ORAL at 17:31

## 2021-06-02 RX ADMIN — IOPAMIDOL 80 ML: 755 INJECTION, SOLUTION INTRAVENOUS at 17:32

## 2021-11-10 ENCOUNTER — HOSPITAL ENCOUNTER (OUTPATIENT)
Dept: CT IMAGING | Age: 81
Discharge: HOME | End: 2021-11-10
Payer: COMMERCIAL

## 2021-11-10 DIAGNOSIS — C34.11 MALIGNANT NEOPLASM OF UPPER LOBE, RIGHT BRONCHUS OR LUNG (HCC): ICD-10-CM

## 2021-11-10 PROCEDURE — 74177 CT ABD & PELVIS W/CONTRAST: CPT

## 2021-11-10 PROCEDURE — 6360000004 HC RX CONTRAST MEDICATION: Performed by: INTERNAL MEDICINE

## 2021-11-10 PROCEDURE — 82565 ASSAY OF CREATININE: CPT

## 2021-11-10 RX ADMIN — IOHEXOL 50 ML: 240 INJECTION, SOLUTION INTRATHECAL; INTRAVASCULAR; INTRAVENOUS; ORAL at 18:12

## 2021-11-10 RX ADMIN — IOPAMIDOL 80 ML: 755 INJECTION, SOLUTION INTRAVENOUS at 18:12

## 2021-11-29 ENCOUNTER — TELEPHONE (OUTPATIENT)
Dept: FAMILY MEDICINE CLINIC | Age: 81
End: 2021-11-29

## 2021-11-29 NOTE — TELEPHONE ENCOUNTER
----- Message from Henrique Garcia sent at 11/29/2021  4:50 PM EST -----  Subject: Appointment Request    Reason for Call: New Patient Request Appointment    QUESTIONS  Type of Appointment? New Patient/New to Provider  Reason for appointment request? No appointments available during search  Additional Information for Provider? Established care, Pt has appt   scheduled with Dr. Myles Kenny on 12/21 at 4pm and would like to change to Dr. Eddie Gonzalez for appt as soon as available. Pt would prefer a text message   instead of voicemail.  ---------------------------------------------------------------------------  --------------  CALL BACK INFO  What is the best way for the office to contact you? OK to leave message on   voicemail  Preferred Call Back Phone Number? [phone]  ---------------------------------------------------------------------------  --------------  SCRIPT ANSWERS  Relationship to Patient? Self  Have your symptoms changed? No  Have you been diagnosed with, awaiting test results for, or told that you   are suspected of having COVID-19 (Coronavirus)? (If patient has tested   negative or was tested as a requirement for work, school, or travel and   not based on symptoms, answer no)? No  Within the past two weeks have you developed any of the following symptoms   (answer no if symptoms have been present longer than 2 weeks or began   more than 2 weeks ago)? Fever or Chills, Cough, Shortness of breath or   difficulty breathing, Loss of taste or smell, Sore throat, Nasal   congestion, Sneezing or runny nose, Fatigue or generalized body aches   (answer no if pain is specific to a body part e.g. back pain), Diarrhea,   Headache? No  Have you had close contact with someone with COVID-19 in the last 14 days? No  (Service Expert  click yes below to proceed with [employer] As Usual   Scheduling)?  Yes

## 2021-11-29 NOTE — TELEPHONE ENCOUNTER
Please help to schedule if Dr. Jay Small has a sooner appointment. Both are booking out for new pts.

## 2021-12-14 NOTE — TELEPHONE ENCOUNTER
Pt calling because he changed his mid and is wanting to see Dr. Kit Sandoval as his PCP. Where can we schedule pt for a new pt apt.  Please Advise

## 2021-12-28 ENCOUNTER — OFFICE VISIT (OUTPATIENT)
Dept: ENT CLINIC | Age: 81
End: 2021-12-28
Payer: COMMERCIAL

## 2021-12-28 VITALS
SYSTOLIC BLOOD PRESSURE: 92 MMHG | TEMPERATURE: 97.2 F | BODY MASS INDEX: 34.06 KG/M2 | HEIGHT: 67 IN | WEIGHT: 217 LBS | DIASTOLIC BLOOD PRESSURE: 50 MMHG | HEART RATE: 62 BPM

## 2021-12-28 DIAGNOSIS — J30.0 VASOMOTOR RHINITIS: Primary | ICD-10-CM

## 2021-12-28 DIAGNOSIS — H61.21 IMPACTED CERUMEN OF RIGHT EAR: ICD-10-CM

## 2021-12-28 PROCEDURE — 99204 OFFICE O/P NEW MOD 45 MIN: CPT | Performed by: OTOLARYNGOLOGY

## 2021-12-28 PROCEDURE — 69210 REMOVE IMPACTED EAR WAX UNI: CPT | Performed by: OTOLARYNGOLOGY

## 2021-12-28 RX ORDER — LATANOPROST 50 UG/ML
SOLUTION/ DROPS OPHTHALMIC
COMMUNITY
Start: 2021-12-05

## 2021-12-28 RX ORDER — IPRATROPIUM BROMIDE 21 UG/1
2 SPRAY, METERED NASAL 4 TIMES DAILY
Qty: 1 EACH | Refills: 3 | Status: SHIPPED | OUTPATIENT
Start: 2021-12-28 | End: 2021-12-29

## 2021-12-28 ASSESSMENT — ENCOUNTER SYMPTOMS
TROUBLE SWALLOWING: 0
COUGH: 0
SORE THROAT: 0
APNEA: 0
FACIAL SWELLING: 0
EYE ITCHING: 0
VOICE CHANGE: 0
SINUS PRESSURE: 0
SHORTNESS OF BREATH: 0
RHINORRHEA: 1

## 2021-12-28 NOTE — PROGRESS NOTES
[facility] [address], [address]  [place], [place]  P: [phone]       Patient     Cristina Pope  1940    ChiefComplaint     Chief Complaint   Patient presents with    Other     Rhinorrhea; on and off for the last 4 weeks. Has never had this happen before. History of Present Illness     Kimberlyn Muniz a 72-year-old male here today for evaluation of intermittent clear drippy rhinorrhea for the last 2 weeks. Left greater than right but is present on both sides. Denies nasal congestion, sneezing. Past Medical History     Past Medical History:   Diagnosis Date    Asthma     CAD (coronary artery disease)     Hypertension     Lung cancer ([address]        Past Surgical History     Past Surgical History:   Procedure Laterality Date    CHOLECYSTECTOMY      CORONARY ARTERY BYPASS GRAFT      DIAGNOSTIC CARDIAC CATH LAB PROCEDURE      ERCP      LUNG CANCER SURGERY      PTCA      numerous       Family History     Family History   Problem Relation Age of Onset    Heart Failure Father        Social History     Social History     Tobacco Use    Smoking status: Never Smoker    Smokeless tobacco: Never Used   Vaping Use    Vaping Use: Never used   Substance Use Topics    Alcohol use: Yes     Comment: socially    Drug use: Never        Allergies     Allergies   Allergen Reactions    Amlodipine Swelling     Edema  Edema      Phenacetin Hives     Other reaction(s): Urticaria    Atorvastatin Other (See Comments)     Other reaction(s): Other (See Comments), Other mild (Specify with comments)  Other reaction(s):  Other mild (Specify with comments)  Possible myalgia from lipitor 80mg  Cerner Allergy Text Annotation: Lipitor  Possible myalgia from lipitor 80mg      Tetracyclines & Related Itching and Rash     Other reaction(s): Unknown  Cerner Allergy Text Annotation: tetracycline  Purpura rash         Medications     Current Outpatient Medications   Medication Sig Dispense Refill    change. Eyes: Negative for itching. Respiratory: Negative for apnea, cough and shortness of breath. Endocrine: Negative for cold intolerance and heat intolerance. Musculoskeletal: Negative for myalgias and neck pain. Skin: Negative for rash. Allergic/Immunologic: Negative for environmental allergies. Neurological: Negative for dizziness and headaches. Psychiatric/Behavioral: Negative for confusion, decreased concentration and sleep disturbance. PhysicalExam     Vitals:    12/28/21 1523   BP: (!) 92/50   Site: Left Upper Arm   Position: Sitting   Cuff Size: Medium Adult   Pulse: 62   Temp: 97.2 °F (36.2 °C)   TempSrc: Infrared   Weight: 217 lb (98.4 kg)   Height: 5' 7\" (1.702 m)       Physical Exam  Constitutional:       General: He is not in acute distress. Appearance: He is well-developed. HENT:      Head: Normocephalic and atraumatic. Right Ear: Ear canal and external ear normal. There is impacted cerumen. Left Ear: Tympanic membrane, ear canal and external ear normal. No drainage. No middle ear effusion. Tympanic membrane is not bulging. Tympanic membrane has normal mobility. Nose: No mucosal edema or rhinorrhea. Mouth/Throat:      Lips: Pink. Mouth: Mucous membranes are moist.      Dentition: Has dentures. Tongue: No lesions. Palate: No mass. Pharynx: Uvula midline. Eyes:      Pupils: Pupils are equal, round, and reactive to light. Neck:      Thyroid: No thyroid mass or thyromegaly. Trachea: Trachea and phonation normal.   Cardiovascular:      Pulses: Normal pulses. Pulmonary:      Effort: Pulmonary effort is normal. No accessory muscle usage or respiratory distress. Breath sounds: No stridor. Musculoskeletal:      Cervical back: Full passive range of motion without pain. Lymphadenopathy:      Head:      Right side of head: No submental or submandibular adenopathy.       Left side of head: No submental or submandibular adenopathy. Cervical: No cervical adenopathy. Right cervical: No superficial, deep or posterior cervical adenopathy. Left cervical: No superficial, deep or posterior cervical adenopathy. Skin:     General: Skin is warm and dry. Neurological:      Mental Status: He is alert and oriented to person, place, and time. Cranial Nerves: No cranial nerve deficit. Coordination: Coordination normal.      Gait: Gait normal.   Psychiatric:         Thought Content: Thought content normal.           Procedure     Removal Impacted Cerumen    An operating microscope was utilized to visualize the external auditory canals using a 3mm speculum. Cerumen was removed with curettes and ovalles suctions on the right side. The tympanic membrane is intact. No fluid visualized in the middle ear. No complications. Assessment and Plan     1. Vasomotor rhinitis  - ipratropium (ATROVENT) 0.03 % nasal spray; 2 sprays by Each Nostril route 4 times daily  Dispense: 1 each; Refill: 3    2. Impacted cerumen of right ear  Removed without complication      He will call if symptoms persist    Arleen Osorio DO  12/28/21      Portions of this note were dictated using Dragon.  There may be linguistic errors secondary to the use of this program.

## 2022-01-07 ENCOUNTER — TELEPHONE (OUTPATIENT)
Dept: FAMILY MEDICINE CLINIC | Age: 82
End: 2022-01-07

## 2022-01-07 NOTE — TELEPHONE ENCOUNTER
----- Message from [address] sent at 2022  4:35 PM EST -----  Subject: Appointment Request    Reason for Call: New Patient Request Appointment    QUESTIONS  Type of Appointment? New Patient/New to Provider  Reason for appointment request? Other - would like an appt sooner than   3/11/2022  Additional Information for Provider? Please contact pt if you have an   eariler appt  ---------------------------------------------------------------------------  --------------  Schedulize  What is the best way for the office to contact you? OK to leave message on   voicemail  Preferred Call Back Phone Number? [phone]  ---------------------------------------------------------------------------  --------------  SCRIPT ANSWERS  Relationship to Patient? Self  Specialty Confirmation? Primary Care  Is this the first appointment to establish care for a ? No  Have you been diagnosed with, awaiting test results for, or told that you   are suspected of having COVID-19 (Coronavirus)? (If patient has tested   negative or was tested as a requirement for work, school, or travel and   not based on symptoms, answer no)? No  Within the past two weeks have you developed any of the following symptoms   (answer no if symptoms have been present longer than 2 weeks or began   more than 2 weeks ago)? Fever or Chills, Cough, Shortness of breath or   difficulty breathing, Loss of taste or smell, Sore throat, Nasal   congestion, Sneezing or runny nose, Fatigue or generalized body aches   (answer no if pain is specific to a body part e.g. back pain), Diarrhea,   Headache? No  Have you had close contact with someone with COVID-19 in the last 14 days? No  (Service Expert  click yes below to proceed with [employer] As Usual   Scheduling)?  Yes

## 2022-01-07 NOTE — TELEPHONE ENCOUNTER
Patient has a future appointment with Adelia Clifton 3/11/2022 to establish as a New Patient, however would like to be seen sooner. Please advise if available appointments with another provider is patient does not want to wait.

## 2022-01-07 NOTE — TELEPHONE ENCOUNTER
Patient requested to be seen by an MD or DO.   We are going to keep the current appointment for 3/11/2022

## 2022-01-10 NOTE — TELEPHONE ENCOUNTER
Juarez Severe [facility]    1/7/22 2:28 PM  Note  Patient requested to be seen by an MD or DO.   We are going to keep the current appointment for 3/11/2022

## 2022-03-07 ENCOUNTER — HOSPITAL ENCOUNTER (OUTPATIENT)
Dept: CT IMAGING | Age: 82
Discharge: HOME | End: 2022-03-07
Payer: COMMERCIAL

## 2022-03-07 DIAGNOSIS — C34.11 MALIGNANT NEOPLASM OF UPPER LOBE, RIGHT BRONCHUS OR LUNG (HCC): ICD-10-CM

## 2022-03-07 LAB
GFR AFRICAN AMERICAN: >60
GFR NON-AFRICAN AMERICAN: >60
PERFORMED ON: NORMAL
POC CREATININE: 1 MG/DL (ref 0.8–1.3)
POC SAMPLE TYPE: NORMAL

## 2022-03-07 PROCEDURE — 6360000004 HC RX CONTRAST MEDICATION: Performed by: INTERNAL MEDICINE

## 2022-03-07 PROCEDURE — 82565 ASSAY OF CREATININE: CPT

## 2022-03-07 PROCEDURE — 74177 CT ABD & PELVIS W/CONTRAST: CPT

## 2022-03-07 RX ADMIN — IOHEXOL 50 ML: 240 INJECTION, SOLUTION INTRATHECAL; INTRAVASCULAR; INTRAVENOUS; ORAL at 17:21

## 2022-03-07 RX ADMIN — IOPAMIDOL 75 ML: 755 INJECTION, SOLUTION INTRAVENOUS at 17:21

## 2022-03-11 ENCOUNTER — OFFICE VISIT (OUTPATIENT)
Dept: FAMILY MEDICINE CLINIC | Age: 82
End: 2022-03-11
Payer: COMMERCIAL

## 2022-03-11 VITALS
HEART RATE: 64 BPM | WEIGHT: 219 LBS | BODY MASS INDEX: 34.3 KG/M2 | OXYGEN SATURATION: 98 % | DIASTOLIC BLOOD PRESSURE: 64 MMHG | SYSTOLIC BLOOD PRESSURE: 132 MMHG

## 2022-03-11 DIAGNOSIS — E78.2 MIXED HYPERLIPIDEMIA: ICD-10-CM

## 2022-03-11 DIAGNOSIS — E11.69 TYPE 2 DIABETES MELLITUS WITH OTHER SPECIFIED COMPLICATION, WITHOUT LONG-TERM CURRENT USE OF INSULIN (HCC): Primary | ICD-10-CM

## 2022-03-11 DIAGNOSIS — I10 ESSENTIAL HYPERTENSION: ICD-10-CM

## 2022-03-11 DIAGNOSIS — E55.9 VITAMIN D DEFICIENCY: ICD-10-CM

## 2022-03-11 DIAGNOSIS — I25.810 CORONARY ARTERY DISEASE INVOLVING CORONARY BYPASS GRAFT OF NATIVE HEART WITHOUT ANGINA PECTORIS: ICD-10-CM

## 2022-03-11 PROBLEM — E11.9 DIABETES MELLITUS (HCC): Status: ACTIVE | Noted: 2022-03-11

## 2022-03-11 LAB — HBA1C MFR BLD: 5.8 %

## 2022-03-11 PROCEDURE — 99203 OFFICE O/P NEW LOW 30 MIN: CPT | Performed by: FAMILY MEDICINE

## 2022-03-11 PROCEDURE — 83036 HEMOGLOBIN GLYCOSYLATED A1C: CPT | Performed by: FAMILY MEDICINE

## 2022-03-11 RX ORDER — METFORMIN HYDROCHLORIDE 750 MG/1
750 TABLET, EXTENDED RELEASE ORAL 2 TIMES DAILY WITH MEALS
Qty: 180 TABLET | Refills: 1 | Status: SHIPPED | OUTPATIENT
Start: 2022-03-11

## 2022-03-11 RX ORDER — EZETIMIBE 10 MG/1
10 TABLET ORAL DAILY
Qty: 90 TABLET | Refills: 1
Start: 2022-03-11

## 2022-03-11 RX ORDER — LISINOPRIL 40 MG/1
40 TABLET ORAL DAILY
Qty: 90 TABLET | Refills: 1
Start: 2022-03-11

## 2022-03-11 RX ORDER — METOPROLOL SUCCINATE 50 MG/1
50 TABLET, EXTENDED RELEASE ORAL 2 TIMES DAILY
Qty: 60 TABLET | Refills: 5
Start: 2022-03-11

## 2022-03-11 RX ORDER — ROSUVASTATIN CALCIUM 40 MG/1
40 TABLET, COATED ORAL EVERY EVENING
Qty: 90 TABLET | Refills: 1
Start: 2022-03-11

## 2022-03-11 SDOH — ECONOMIC STABILITY: FOOD INSECURITY: WITHIN THE PAST 12 MONTHS, THE FOOD YOU BOUGHT JUST DIDN'T LAST AND YOU DIDN'T HAVE MONEY TO GET MORE.: NEVER TRUE

## 2022-03-11 SDOH — ECONOMIC STABILITY: TRANSPORTATION INSECURITY
IN THE PAST 12 MONTHS, HAS THE LACK OF TRANSPORTATION KEPT YOU FROM MEDICAL APPOINTMENTS OR FROM GETTING MEDICATIONS?: NO

## 2022-03-11 SDOH — ECONOMIC STABILITY: FOOD INSECURITY: WITHIN THE PAST 12 MONTHS, YOU WORRIED THAT YOUR FOOD WOULD RUN OUT BEFORE YOU GOT MONEY TO BUY MORE.: NEVER TRUE

## 2022-03-11 SDOH — ECONOMIC STABILITY: HOUSING INSECURITY
IN THE LAST 12 MONTHS, WAS THERE A TIME WHEN YOU DID NOT HAVE A STEADY PLACE TO SLEEP OR SLEPT IN A SHELTER (INCLUDING NOW)?: NO

## 2022-03-11 SDOH — ECONOMIC STABILITY: HOUSING INSECURITY: IN THE LAST 12 MONTHS, HOW MANY PLACES HAVE YOU LIVED?: 1

## 2022-03-11 SDOH — ECONOMIC STABILITY: TRANSPORTATION INSECURITY
IN THE PAST 12 MONTHS, HAS LACK OF TRANSPORTATION KEPT YOU FROM MEETINGS, WORK, OR FROM GETTING THINGS NEEDED FOR DAILY LIVING?: NO

## 2022-03-11 SDOH — ECONOMIC STABILITY: INCOME INSECURITY: IN THE LAST 12 MONTHS, WAS THERE A TIME WHEN YOU WERE NOT ABLE TO PAY THE MORTGAGE OR RENT ON TIME?: NO

## 2022-03-11 ASSESSMENT — PATIENT HEALTH QUESTIONNAIRE - PHQ9
2. FEELING DOWN, DEPRESSED OR HOPELESS: 0
1. LITTLE INTEREST OR PLEASURE IN DOING THINGS: 0
SUM OF ALL RESPONSES TO PHQ QUESTIONS 1-9: 0
SUM OF ALL RESPONSES TO PHQ QUESTIONS 1-9: 0
SUM OF ALL RESPONSES TO PHQ9 QUESTIONS 1 & 2: 0
SUM OF ALL RESPONSES TO PHQ QUESTIONS 1-9: 0
SUM OF ALL RESPONSES TO PHQ QUESTIONS 1-9: 0

## 2022-03-11 ASSESSMENT — LIFESTYLE VARIABLES
HOW MANY STANDARD DRINKS CONTAINING ALCOHOL DO YOU HAVE ON A TYPICAL DAY: 1 OR 2
HOW OFTEN DO YOU HAVE A DRINK CONTAINING ALCOHOL: 2-3 TIMES A WEEK

## 2022-03-11 ASSESSMENT — SOCIAL DETERMINANTS OF HEALTH (SDOH): HOW HARD IS IT FOR YOU TO PAY FOR THE VERY BASICS LIKE FOOD, HOUSING, MEDICAL CARE, AND HEATING?: NOT HARD AT ALL

## 2022-03-11 NOTE — PROGRESS NOTES
Pascale Colmenares is a [de-identified] y.o. male    Chief Complaint   Patient presents with    Established New Doctor    Diabetes     Type 2 no insulin - please add to problem list     Hypertension    Hyperlipidemia       HPI:    Diabetes  He presents for his follow-up diabetic visit. He has type 2 diabetes mellitus. His disease course has been stable. Pertinent negatives for diabetes include no polydipsia. Diabetic complications include heart disease. Risk factors for coronary artery disease include obesity, diabetes mellitus and hypertension. Current diabetic treatment includes oral agent (monotherapy). An ACE inhibitor/angiotensin II receptor blocker is being taken. Hypertension  This is a chronic problem. The current episode started more than 1 year ago. The problem is unchanged. The problem is controlled. Past treatments include beta blockers and ACE inhibitors. The current treatment provides significant improvement. There are no compliance problems. Hypertensive end-organ damage includes CAD/MI. There is no history of kidney disease. Hyperlipidemia  This is a chronic problem. The current episode started more than 1 year ago. The problem is controlled. Recent lipid tests were reviewed and are normal. Exacerbating diseases include diabetes. Pertinent negatives include no myalgias. Current antihyperlipidemic treatment includes statins and ezetimibe. The current treatment provides significant improvement of lipids. There are no compliance problems. Risk factors for coronary artery disease include hypertension, male sex, obesity and diabetes mellitus. ROS:    Review of Systems   Endocrine: Negative for polydipsia. Musculoskeletal: Negative for myalgias. /64   Pulse 64   Wt 219 lb (99.3 kg)   SpO2 98%   BMI 34.30 kg/m²     Physical Exam:    Physical Exam  Constitutional:       General: He is not in acute distress. Appearance: Normal appearance. He is well-developed. He is obese.  He is not ill-appearing, toxic-appearing or diaphoretic. HENT:      Head: Normocephalic. Cardiovascular:      Rate and Rhythm: Normal rate and regular rhythm. Pulses: Normal pulses. Heart sounds: No murmur heard. Pulmonary:      Effort: Pulmonary effort is normal. No respiratory distress. Breath sounds: Normal breath sounds. No wheezing. Musculoskeletal:      Cervical back: Normal range of motion. No rigidity. Lymphadenopathy:      Cervical: No cervical adenopathy. Neurological:      Mental Status: He is alert. Psychiatric:         Mood and Affect: Mood normal.         Behavior: Behavior normal.         Thought Content: Thought content normal.         Current Outpatient Medications   Medication Sig Dispense Refill    metFORMIN (GLUCOPHAGE-XR) 750 MG extended release tablet Take 1 tablet by mouth 2 times daily (with meals) 180 tablet 1    ezetimibe (ZETIA) 10 MG tablet Take 1 tablet by mouth daily 90 tablet 1    metoprolol succinate (TOPROL XL) 50 MG extended release tablet Take 1 tablet by mouth 2 times daily 60 tablet 5    rosuvastatin (CRESTOR) 40 MG tablet Take 1 tablet by mouth every evening 90 tablet 1    lisinopril (PRINIVIL;ZESTRIL) 40 MG tablet Take 1 tablet by mouth daily 90 tablet 1    ipratropium (ATROVENT) 0.03 % nasal spray USE 2 SPRAYS IN EACH NOSTRIL FOUR TIMES DAILY 3 each 3    latanoprost (XALATAN) 0.005 % ophthalmic solution USE 1 DROP IN BOTH EYES EVERY EVENING      azelastine (ASTELIN) 0.1 % nasal spray USE 2 SPRAYS IN EACH NOSTRIL TWICE DAILY AS DIRECTED 30 mL 3    Osimertinib Mesylate (TAGRISSO) 80 MG TABS Take by mouth      gabapentin (NEURONTIN) 300 MG capsule Take 2 capsules by mouth 2 times daily for 150 days.  Intended supply: 30 days 120 capsule 4    Acetaminophen (TYLENOL) 325 MG CAPS Take 975 mg by mouth every 8 hours      clopidogrel (PLAVIX) 75 MG tablet Take 1 tablet by mouth daily      Multiple Vitamins-Minerals (MULTIVITAMIN ADULT PO) Take 1 tablet by mouth daily      Polyethylene Glycol 3350 (GLYCOLAX PO) Take 17 g by mouth      aspirin 81 MG EC tablet Take 162 mg by mouth daily      Omega-3 Fatty Acids (OMEGA 3 PO) Take by mouth daily      cholestyramine (QUESTRAN) 4 g packet Take 1 packet by mouth 3 times daily (with meals)      LORazepam (ATIVAN PO) Take by mouth as needed      ALPRAZolam (XANAX PO) Take by mouth as needed      HYDROCODONE-ACETAMINOPHEN PO Take by mouth as needed (Patient not taking: Reported on 12/28/2021)       No current facility-administered medications for this visit. Assessment:    1. Type 2 diabetes mellitus with other specified complication, without long-term current use of insulin ([facility] [address]    2. Essential hypertension    3. Mixed hyperlipidemia    4. Coronary artery disease involving coronary bypass graft of native heart without angina pectoris    5. Vitamin D deficiency        Plan:    1. Type 2 diabetes mellitus with other specified complication, without long-term current use of insulin ([facility])  Stable. Continue current medications. - POCT glycosylated hemoglobin (Hb A1C) 5.8  - metFORMIN (GLUCOPHAGE-XR) 750 MG extended release tablet; Take 1 tablet by mouth 2 times daily (with meals)  Dispense: 180 tablet; Refill: 1    2. Essential hypertension  Stable. Continue current medications. - metoprolol succinate (TOPROL XL) 50 MG extended release tablet; Take 1 tablet by mouth 2 times daily  Dispense: 60 tablet; Refill: 5  - lisinopril (PRINIVIL;ZESTRIL) 40 MG tablet; Take 1 tablet by mouth daily  Dispense: 90 tablet; Refill: 1    3. Mixed hyperlipidemia  Recheck labs. - ezetimibe (ZETIA) 10 MG tablet; Take 1 tablet by mouth daily  Dispense: 90 tablet; Refill: 1  - rosuvastatin (CRESTOR) 40 MG tablet; Take 1 tablet by mouth every evening  Dispense: 90 tablet; Refill: 1  - CBC with Auto Differential; Future  - Comprehensive Metabolic Panel; Future  - Lipid Panel; Future  - Vitamin B12 & Folate;  Future  - TSH with Reflex; Future    4. Coronary artery disease involving coronary bypass graft of native heart without angina pectoris  Stable. Continue current medications. - metoprolol succinate (TOPROL XL) 50 MG extended release tablet; Take 1 tablet by mouth 2 times daily  Dispense: 60 tablet; Refill: 5    5. Vitamin D deficiency  - Vitamin D 25 Hydroxy; Future      Return in about 3 months (around 6/11/2022) for Diabetes, Hypertension, Hyperlipidemia.

## 2022-04-05 RX ORDER — ALPRAZOLAM 0.25 MG/1
0.25 TABLET ORAL PRN
Status: CANCELLED | OUTPATIENT
Start: 2022-04-05 | End: 2022-05-05

## 2022-04-05 NOTE — TELEPHONE ENCOUNTER
Refill Request - Controlled Substance    Last Seen Department: 3/11/2022  Last Seen by PCP: 3/11/2022    Last Written: NEVER BEEN PRESCRIBED BY US BEFORE. HE WAS A NEW PT 3/11    Last UDS: N/A    Med Agreement Signed On: N/A    Next Appointment: 6/13/2022    Future appointment scheduled    Requested Prescriptions     Pending Prescriptions Disp Refills    ALPRAZolam (XANAX) 0.25 MG tablet       Sig: Take 1 tablet by mouth as needed for Anxiety for up to 30 days.

## 2022-04-12 NOTE — TELEPHONE ENCOUNTER
This medicine has never been sent by me. It is a controlled substance for anxiety and we try to avoid it. I do not see he has taken recently. I wonder if this was accidentally sent to us?

## 2022-04-12 NOTE — TELEPHONE ENCOUNTER
Pt states this is not a mistake. He had this prescribed with his past provider, he been having some trouble at work recently and takes one here and there to help him. Also if he has a CT scan coming up here will take one to relax him. Pt willing to try something different wants to know the medication first before sending something in. Retinal tear and detachment warning symptoms reviewed and patient instructed to call immediately if increasing floaters, flashes, or decreasing peripheral vision.

## 2022-04-14 NOTE — TELEPHONE ENCOUNTER
Has the patient ever tried BuSpar before as needed for anxiety. This is a noncontrolled medicine for anxiety. I would prefer to try that first as needed. We will consider an occasional Xanax in the next visit but not daily for anxiety.

## 2022-04-18 DIAGNOSIS — R05.3 CHRONIC COUGH: ICD-10-CM

## 2022-04-18 RX ORDER — GABAPENTIN 300 MG/1
CAPSULE ORAL
Qty: 120 CAPSULE | Refills: 4 | OUTPATIENT
Start: 2022-04-18

## 2022-04-18 NOTE — TELEPHONE ENCOUNTER
Pt wants to have a medication discussion in May, he will not wait for a discussion in June when he has his Diabetes F/U.  He insisted on you prescribing Xanax

## 2022-05-20 ENCOUNTER — TELEPHONE (OUTPATIENT)
Dept: FAMILY MEDICINE CLINIC | Age: 82
End: 2022-05-20

## 2022-05-20 ENCOUNTER — OFFICE VISIT (OUTPATIENT)
Dept: FAMILY MEDICINE CLINIC | Age: 82
End: 2022-05-20
Payer: COMMERCIAL

## 2022-05-20 VITALS
BODY MASS INDEX: 33.67 KG/M2 | DIASTOLIC BLOOD PRESSURE: 68 MMHG | HEART RATE: 72 BPM | WEIGHT: 215 LBS | SYSTOLIC BLOOD PRESSURE: 130 MMHG | OXYGEN SATURATION: 98 %

## 2022-05-20 DIAGNOSIS — F41.1 GAD (GENERALIZED ANXIETY DISORDER): ICD-10-CM

## 2022-05-20 DIAGNOSIS — Z23 NEED FOR DIPHTHERIA-TETANUS-PERTUSSIS (TDAP) VACCINE: ICD-10-CM

## 2022-05-20 DIAGNOSIS — L03.116 LEFT LEG CELLULITIS: Primary | ICD-10-CM

## 2022-05-20 DIAGNOSIS — M79.89 LEFT LEG SWELLING: ICD-10-CM

## 2022-05-20 PROCEDURE — 90715 TDAP VACCINE 7 YRS/> IM: CPT | Performed by: FAMILY MEDICINE

## 2022-05-20 PROCEDURE — 90471 IMMUNIZATION ADMIN: CPT | Performed by: FAMILY MEDICINE

## 2022-05-20 PROCEDURE — 99214 OFFICE O/P EST MOD 30 MIN: CPT | Performed by: FAMILY MEDICINE

## 2022-05-20 RX ORDER — ALPRAZOLAM 0.25 MG/1
0.25 TABLET ORAL DAILY PRN
Qty: 10 TABLET | Refills: 0 | Status: SHIPPED | OUTPATIENT
Start: 2022-05-20 | End: 2023-05-20

## 2022-05-20 RX ORDER — SULFAMETHOXAZOLE AND TRIMETHOPRIM 800; 160 MG/1; MG/1
1 TABLET ORAL 2 TIMES DAILY
Qty: 14 TABLET | Refills: 0 | Status: SHIPPED | OUTPATIENT
Start: 2022-05-20 | End: 2022-05-20 | Stop reason: CLARIF

## 2022-05-20 RX ORDER — AMOXICILLIN AND CLAVULANATE POTASSIUM 875; 125 MG/1; MG/1
1 TABLET, FILM COATED ORAL 2 TIMES DAILY
Qty: 14 TABLET | Refills: 0 | Status: SHIPPED | OUTPATIENT
Start: 2022-05-20 | End: 2022-05-24 | Stop reason: ALTCHOICE

## 2022-05-20 RX ORDER — FUROSEMIDE 20 MG/1
20 TABLET ORAL DAILY PRN
Qty: 10 TABLET | Refills: 0
Start: 2022-05-20 | End: 2022-05-24 | Stop reason: SDUPTHER

## 2022-05-20 RX ORDER — FUROSEMIDE 20 MG/1
20 TABLET ORAL DAILY PRN
Qty: 10 TABLET | Refills: 0 | Status: SHIPPED | OUTPATIENT
Start: 2022-05-20 | End: 2022-05-20 | Stop reason: CLARIF
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
## 2022-05-20 NOTE — TELEPHONE ENCOUNTER
On call Communication: Pt messaged this morning requesting an appointment for evaluation of cellulitis in his left leg after a fall.  Please help him get scheduled this morning, it looks like Dr. Kerry Max has an opening at 10 AM

## 2022-05-22 ENCOUNTER — TELEPHONE (OUTPATIENT)
Dept: FAMILY MEDICINE CLINIC | Age: 82
End: 2022-05-22

## 2022-05-22 RX ORDER — SULFAMETHOXAZOLE AND TRIMETHOPRIM 800; 160 MG/1; MG/1
1 TABLET ORAL 2 TIMES DAILY
Qty: 14 TABLET | Refills: 0 | Status: SHIPPED | OUTPATIENT
Start: 2022-05-22 | End: 2022-05-27 | Stop reason: SDUPTHER

## 2022-05-22 NOTE — PROGRESS NOTES
On call communication:  Call from Ezra Delatorre. Taking augmenting as ordered during office visit last week. Concerned that redness and swelling has not improved, feels is worsening. Ordered Bactrim to take in addition to Augmentin. Ice for 15 min 4 times daily. Call office for follow up if fails to improve.

## 2022-05-24 ENCOUNTER — TELEMEDICINE (OUTPATIENT)
Dept: FAMILY MEDICINE CLINIC | Age: 82
End: 2022-05-24
Payer: COMMERCIAL

## 2022-05-24 DIAGNOSIS — L03.116 LEFT LEG CELLULITIS: Primary | ICD-10-CM

## 2022-05-24 DIAGNOSIS — M79.89 LEFT LEG SWELLING: ICD-10-CM

## 2022-05-24 PROCEDURE — 99213 OFFICE O/P EST LOW 20 MIN: CPT | Performed by: FAMILY MEDICINE

## 2022-05-24 RX ORDER — FUROSEMIDE 20 MG/1
20 TABLET ORAL DAILY PRN
Qty: 30 TABLET | Refills: 0 | Status: SHIPPED | OUTPATIENT
Start: 2022-05-24 | End: 2022-06-13 | Stop reason: SDUPTHER

## 2022-05-24 RX ORDER — CEPHALEXIN 500 MG/1
500 CAPSULE ORAL 3 TIMES DAILY
Qty: 21 CAPSULE | Refills: 0 | Status: SHIPPED | OUTPATIENT
Start: 2022-05-24 | End: 2022-05-27 | Stop reason: SDUPTHER

## 2022-05-24 NOTE — TELEPHONE ENCOUNTER
Pt called in and said Infection in the left leg has more swelling, more redness, more tenderness, in left leg and moving up to the leg to the thigh and different to walk on the left leg.  Please advise

## 2022-05-24 NOTE — PROGRESS NOTES
times daily (with meals) 180 tablet 1    ezetimibe (ZETIA) 10 MG tablet Take 1 tablet by mouth daily 90 tablet 1    metoprolol succinate (TOPROL XL) 50 MG extended release tablet Take 1 tablet by mouth 2 times daily 60 tablet 5    rosuvastatin (CRESTOR) 40 MG tablet Take 1 tablet by mouth every evening 90 tablet 1    lisinopril (PRINIVIL;ZESTRIL) 40 MG tablet Take 1 tablet by mouth daily 90 tablet 1    ipratropium (ATROVENT) 0.03 % nasal spray USE 2 SPRAYS IN EACH NOSTRIL FOUR TIMES DAILY 3 each 3    latanoprost (XALATAN) 0.005 % ophthalmic solution USE 1 DROP IN BOTH EYES EVERY EVENING      azelastine (ASTELIN) 0.1 % nasal spray USE 2 SPRAYS IN EACH NOSTRIL TWICE DAILY AS DIRECTED 30 mL 3    Osimertinib Mesylate (TAGRISSO) 80 MG TABS Take by mouth      gabapentin (NEURONTIN) 300 MG capsule Take 2 capsules by mouth 2 times daily for 150 days. Intended supply: 30 days 120 capsule 4    Acetaminophen (TYLENOL) 325 MG CAPS Take 975 mg by mouth every 8 hours      clopidogrel (PLAVIX) 75 MG tablet Take 1 tablet by mouth daily      Multiple Vitamins-Minerals (MULTIVITAMIN ADULT PO) Take 1 tablet by mouth daily      Polyethylene Glycol 3350 (GLYCOLAX PO) Take 17 g by mouth      aspirin 81 MG EC tablet Take 162 mg by mouth daily      Omega-3 Fatty Acids (OMEGA 3 PO) Take by mouth daily      cholestyramine (QUESTRAN) 4 g packet Take 1 packet by mouth 3 times daily (with meals)      LORazepam (ATIVAN PO) Take by mouth as needed      HYDROCODONE-ACETAMINOPHEN PO Take by mouth as needed (Patient not taking: Reported on 12/28/2021)       No current facility-administered medications for this visit. Assessment:    1. Left leg cellulitis    2. Left leg swelling        Plan:    1. Left leg cellulitis  No history of MRSA. He was recently given Bactrim but the cellulitis has not improved. I would recommend stopping Augmentin and doing Keflex and continue the Bactrim.   If no improvement he should go to the ER for IV antibiotics. Discussed leg elevation and keeping the lesions clean and dry. - furosemide (LASIX) 20 MG tablet; Take 1 tablet by mouth daily as needed (leg swelling)  Dispense: 30 tablet; Refill: 0    2. Left leg swelling  Try the Lasix 20 mg for the mild swelling. I have a very low suspicion for DVT. No calf pain. No history of DVT. - furosemide (LASIX) 20 MG tablet; Take 1 tablet by mouth daily as needed (leg swelling)  Dispense: 10 tablet; Refill: 0      Patient to return to clinic if symptoms worsen or fail to improve.

## 2022-05-27 RX ORDER — SULFAMETHOXAZOLE AND TRIMETHOPRIM 800; 160 MG/1; MG/1
1 TABLET ORAL 2 TIMES DAILY
Qty: 6 TABLET | Refills: 0 | Status: SHIPPED | OUTPATIENT
Start: 2022-05-27 | End: 2022-08-12 | Stop reason: SDUPTHER

## 2022-05-27 RX ORDER — CEPHALEXIN 500 MG/1
500 CAPSULE ORAL 3 TIMES DAILY
Qty: 9 CAPSULE | Refills: 0 | Status: SHIPPED | OUTPATIENT
Start: 2022-05-27 | End: 2022-08-12 | Stop reason: SDUPTHER

## 2022-06-03 ENCOUNTER — TELEPHONE (OUTPATIENT)
Dept: FAMILY MEDICINE CLINIC | Age: 82
End: 2022-06-03

## 2022-06-06 ENCOUNTER — TELEPHONE (OUTPATIENT)
Dept: FAMILY MEDICINE CLINIC | Age: 82
End: 2022-06-06

## 2022-06-06 NOTE — TELEPHONE ENCOUNTER
----- Message from Marilee Wyatt sent at 6/2/2022  3:52 PM EDT -----  Subject: Message to Provider    QUESTIONS  Information for Provider? Patient would like a call if anything becomes   available.  ---------------------------------------------------------------------------  --------------  CALL BACK INFO  What is the best way for the office to contact you? OK to leave message on   voicemail  Preferred Call Back Phone Number? [phone]  ---------------------------------------------------------------------------  --------------  SCRIPT ANSWERS  Relationship to Patient?  Self

## 2022-06-13 ENCOUNTER — OFFICE VISIT (OUTPATIENT)
Dept: FAMILY MEDICINE CLINIC | Age: 82
End: 2022-06-13
Payer: COMMERCIAL

## 2022-06-13 VITALS
HEART RATE: 63 BPM | WEIGHT: 215 LBS | BODY MASS INDEX: 33.67 KG/M2 | SYSTOLIC BLOOD PRESSURE: 134 MMHG | DIASTOLIC BLOOD PRESSURE: 76 MMHG | OXYGEN SATURATION: 99 %

## 2022-06-13 DIAGNOSIS — Z00.00 PREVENTATIVE HEALTH CARE: Primary | ICD-10-CM

## 2022-06-13 DIAGNOSIS — I10 ESSENTIAL HYPERTENSION: ICD-10-CM

## 2022-06-13 DIAGNOSIS — E11.69 TYPE 2 DIABETES MELLITUS WITH OTHER SPECIFIED COMPLICATION, WITHOUT LONG-TERM CURRENT USE OF INSULIN (HCC): ICD-10-CM

## 2022-06-13 DIAGNOSIS — R05.9 COUGH: ICD-10-CM

## 2022-06-13 DIAGNOSIS — I25.810 CORONARY ARTERY DISEASE INVOLVING CORONARY BYPASS GRAFT OF NATIVE HEART WITHOUT ANGINA PECTORIS: ICD-10-CM

## 2022-06-13 DIAGNOSIS — E78.2 MIXED HYPERLIPIDEMIA: ICD-10-CM

## 2022-06-13 DIAGNOSIS — E55.9 VITAMIN D DEFICIENCY: ICD-10-CM

## 2022-06-13 LAB — HBA1C MFR BLD: 6 %

## 2022-06-13 PROCEDURE — 99397 PER PM REEVAL EST PAT 65+ YR: CPT | Performed by: FAMILY MEDICINE

## 2022-06-13 PROCEDURE — 83036 HEMOGLOBIN GLYCOSYLATED A1C: CPT | Performed by: FAMILY MEDICINE

## 2022-06-13 RX ORDER — BENZONATATE 200 MG/1
200 CAPSULE ORAL 3 TIMES DAILY PRN
Qty: 30 CAPSULE | Refills: 1 | Status: SHIPPED | OUTPATIENT
Start: 2022-06-13 | End: 2022-08-22

## 2022-06-13 RX ORDER — FUROSEMIDE 20 MG/1
20 TABLET ORAL DAILY PRN
Qty: 30 TABLET | Refills: 0 | Status: SHIPPED | OUTPATIENT
Start: 2022-06-13 | End: 2022-08-22

## 2022-06-13 ASSESSMENT — ENCOUNTER SYMPTOMS: COUGH: 1

## 2022-06-13 NOTE — PROGRESS NOTES
Ann Marie Taylor is a [de-identified] y.o. male    Chief Complaint   Patient presents with    Diabetes    Cough     Dry    Other     Left leg - wants you to look at     Hypertension    Hyperlipidemia       HPI:    Preventative care. He is up-to-date with his COVID vaccines. He needs to get his new shingles vaccine. Diabetes  He presents for his follow-up diabetic visit. He has type 2 diabetes mellitus. His disease course has been stable. Pertinent negatives for diabetes include no polydipsia. Diabetic complications include heart disease. Risk factors for coronary artery disease include obesity, diabetes mellitus and hypertension. Current diabetic treatment includes oral agent (monotherapy). An ACE inhibitor/angiotensin II receptor blocker is being taken. Hypertension  This is a chronic problem. The current episode started more than 1 year ago. The problem is unchanged. The problem is controlled. Past treatments include beta blockers and ACE inhibitors. The current treatment provides significant improvement. There are no compliance problems. Hypertensive end-organ damage includes CAD/MI. There is no history of kidney disease. Hyperlipidemia  This is a chronic problem. The current episode started more than 1 year ago. The problem is controlled. Recent lipid tests were reviewed and are normal. Exacerbating diseases include diabetes. Pertinent negatives include no myalgias. Current antihyperlipidemic treatment includes statins and ezetimibe. The current treatment provides significant improvement of lipids. There are no compliance problems. Risk factors for coronary artery disease include hypertension, male sex, obesity and diabetes mellitus. Cough  This is a new problem. The current episode started 1 to 4 weeks ago. The problem has been unchanged. The cough is non-productive. Pertinent negatives include no myalgias. Other  Associated symptoms include coughing. Pertinent negatives include no myalgias. ROS:    Review of Systems   Respiratory: Positive for cough. Endocrine: Negative for polydipsia. Musculoskeletal: Negative for myalgias. /76   Pulse 63   Wt 215 lb (97.5 kg)   SpO2 99%   BMI 33.67 kg/m²     Physical Exam:    Physical Exam  Constitutional:       General: He is not in acute distress. Appearance: Normal appearance. He is well-developed. He is obese. He is not ill-appearing, toxic-appearing or diaphoretic. HENT:      Head: Normocephalic. Cardiovascular:      Rate and Rhythm: Normal rate and regular rhythm. Pulses: Normal pulses. Heart sounds: No murmur heard. Pulmonary:      Effort: Pulmonary effort is normal. No respiratory distress. Breath sounds: Normal breath sounds. No wheezing. Musculoskeletal:      Cervical back: Normal range of motion. No rigidity. Lymphadenopathy:      Cervical: No cervical adenopathy. Neurological:      Mental Status: He is alert. Psychiatric:         Mood and Affect: Mood normal.         Behavior: Behavior normal.         Thought Content: Thought content normal.         Current Outpatient Medications   Medication Sig Dispense Refill    benzonatate (TESSALON) 200 MG capsule Take 1 capsule by mouth 3 times daily as needed for Cough 30 capsule 1    furosemide (LASIX) 20 MG tablet Take 1 tablet by mouth daily as needed (leg swelling) 30 tablet 0    ALPRAZolam (XANAX) 0.25 MG tablet Take 1 tablet by mouth daily as needed for Anxiety.  10 tablet 0    metFORMIN (GLUCOPHAGE-XR) 750 MG extended release tablet Take 1 tablet by mouth 2 times daily (with meals) 180 tablet 1    ezetimibe (ZETIA) 10 MG tablet Take 1 tablet by mouth daily 90 tablet 1    metoprolol succinate (TOPROL XL) 50 MG extended release tablet Take 1 tablet by mouth 2 times daily 60 tablet 5    rosuvastatin (CRESTOR) 40 MG tablet Take 1 tablet by mouth every evening 90 tablet 1    lisinopril (PRINIVIL;ZESTRIL) 40 MG tablet Take 1 tablet by mouth daily 90 tablet 1    ipratropium (ATROVENT) 0.03 % nasal spray USE 2 SPRAYS IN EACH NOSTRIL FOUR TIMES DAILY 3 each 3    latanoprost (XALATAN) 0.005 % ophthalmic solution USE 1 DROP IN BOTH EYES EVERY EVENING      azelastine (ASTELIN) 0.1 % nasal spray USE 2 SPRAYS IN EACH NOSTRIL TWICE DAILY AS DIRECTED 30 mL 3    Osimertinib Mesylate (TAGRISSO) 80 MG TABS Take by mouth      gabapentin (NEURONTIN) 300 MG capsule Take 2 capsules by mouth 2 times daily for 150 days. Intended supply: 30 days 120 capsule 4    Acetaminophen (TYLENOL) 325 MG CAPS Take 975 mg by mouth every 8 hours      clopidogrel (PLAVIX) 75 MG tablet Take 1 tablet by mouth daily      Multiple Vitamins-Minerals (MULTIVITAMIN ADULT PO) Take 1 tablet by mouth daily      Polyethylene Glycol 3350 (GLYCOLAX PO) Take 17 g by mouth      aspirin 81 MG EC tablet Take 162 mg by mouth daily      Omega-3 Fatty Acids (OMEGA 3 PO) Take by mouth daily      cholestyramine (QUESTRAN) 4 g packet Take 1 packet by mouth 3 times daily (with meals)      HYDROCODONE-ACETAMINOPHEN PO Take by mouth as needed (Patient not taking: Reported on 12/28/2021)       No current facility-administered medications for this visit. Assessment:    1. Preventative health care    2. Type 2 diabetes mellitus with other specified complication, without long-term current use of insulin ([address]    3. Essential hypertension    4. Mixed hyperlipidemia    5. Coronary artery disease involving coronary bypass graft of native heart without angina pectoris    6. Vitamin D deficiency    7. Cough        Plan:    Preventative health care    1. Type 2 diabetes mellitus with other specified complication, without long-term current use of insulin (HCC)  Stable. Continue current medications. - POCT glycosylated hemoglobin (Hb A1C) 6.0  - metFORMIN (GLUCOPHAGE-XR) 750 MG extended release tablet; Take 1 tablet by mouth 2 times daily (with meals)  Dispense: 180 tablet; Refill: 1    2.  Essential hypertension  Stable. Continue current medications. - metoprolol succinate (TOPROL XL) 50 MG extended release tablet; Take 1 tablet by mouth 2 times daily  Dispense: 60 tablet; Refill: 5  - lisinopril (PRINIVIL;ZESTRIL) 40 MG tablet; Take 1 tablet by mouth daily  Dispense: 90 tablet; Refill: 1    3. Mixed hyperlipidemia  Recheck labs. - ezetimibe (ZETIA) 10 MG tablet; Take 1 tablet by mouth daily  Dispense: 90 tablet; Refill: 1  - rosuvastatin (CRESTOR) 40 MG tablet; Take 1 tablet by mouth every evening  Dispense: 90 tablet; Refill: 1  - CBC with Auto Differential; Future  - Comprehensive Metabolic Panel; Future  - Lipid Panel; Future  - Vitamin B12 & Folate; Future  - TSH with Reflex; Future    4. Coronary artery disease involving coronary bypass graft of native heart without angina pectoris  Stable. Continue current medications. - metoprolol succinate (TOPROL XL) 50 MG extended release tablet; Take 1 tablet by mouth 2 times daily  Dispense: 60 tablet; Refill: 5    5. Vitamin D deficiency  - Vitamin D 25 Hydroxy; Future    6. Cough  Likely related to allergies. He had stopped lisinopril in the past and his cough did not get better. Try Tessalon Perles. Consider adding on Claritin as well. He will go for lung imaging with a CT of the chest next week. Return in about 6 months (around 12/13/2022) for Diabetes, Hypertension, Hyperlipidemia.

## 2022-06-14 LAB
A/G RATIO: 1.9 (ref 1.1–2.2)
ALBUMIN SERPL-MCNC: 4 G/DL (ref 3.4–5)
ALP BLD-CCNC: 60 U/L (ref 40–129)
ALT SERPL-CCNC: 33 U/L (ref 10–40)
ANION GAP SERPL CALCULATED.3IONS-SCNC: 15 MMOL/L (ref 3–16)
AST SERPL-CCNC: 35 U/L (ref 15–37)
BILIRUB SERPL-MCNC: <0.2 MG/DL (ref 0–1)
BUN BLDV-MCNC: 13 MG/DL (ref 7–20)
CALCIUM SERPL-MCNC: 9.2 MG/DL (ref 8.3–10.6)
CHLORIDE BLD-SCNC: 99 MMOL/L (ref 99–110)
CHOLESTEROL, TOTAL: 109 MG/DL (ref 0–199)
CO2: 19 MMOL/L (ref 21–32)
CREAT SERPL-MCNC: 0.9 MG/DL (ref 0.8–1.3)
GFR AFRICAN AMERICAN: >60
GFR NON-AFRICAN AMERICAN: >60
GLUCOSE BLD-MCNC: 104 MG/DL (ref 70–99)
HDLC SERPL-MCNC: 48 MG/DL (ref 40–60)
LDL CHOLESTEROL CALCULATED: 41 MG/DL
MAGNESIUM: 1.8 MG/DL (ref 1.8–2.4)
POTASSIUM SERPL-SCNC: 4.5 MMOL/L (ref 3.5–5.1)
SODIUM BLD-SCNC: 133 MMOL/L (ref 136–145)
TOTAL PROTEIN: 6.1 G/DL (ref 6.4–8.2)
TRIGL SERPL-MCNC: 101 MG/DL (ref 0–150)
TSH REFLEX: 2.91 UIU/ML (ref 0.27–4.2)
VLDLC SERPL CALC-MCNC: 20 MG/DL

## 2022-06-15 LAB
FOLATE: >20 NG/ML (ref 4.78–24.2)
VITAMIN B-12: 453 PG/ML (ref 211–911)
VITAMIN D 25-HYDROXY: 35.2 NG/ML

## 2022-06-22 ENCOUNTER — HOSPITAL ENCOUNTER (OUTPATIENT)
Dept: CT IMAGING | Age: 82
Discharge: HOME | End: 2022-06-22
Payer: COMMERCIAL

## 2022-06-22 DIAGNOSIS — C34.11 MALIGNANT NEOPLASM OF UPPER LOBE, RIGHT BRONCHUS OR LUNG (HCC): ICD-10-CM

## 2022-06-22 PROCEDURE — 74177 CT ABD & PELVIS W/CONTRAST: CPT

## 2022-06-22 PROCEDURE — 6360000004 HC RX CONTRAST MEDICATION: Performed by: INTERNAL MEDICINE

## 2022-06-22 RX ADMIN — IOPAMIDOL 80 ML: 755 INJECTION, SOLUTION INTRAVENOUS at 11:44

## 2022-06-28 DIAGNOSIS — R05.9 COUGH: Primary | ICD-10-CM

## 2022-07-05 NOTE — PROGRESS NOTES
Pulmonary Outpatient Note   Pam Peres MD       7/6/2022    1. Abnormal CT of the chest    2. Malignant neoplasm of upper lobe of right lung ([address]    3. History of lobectomy of lung    4. Chronic cough    5. ANDRE (obstructive sleep apnea)          ASSESSMENT/PLAN:  Abnormal CT chest, adenocarcinoma RUL s/p lobectomy. He has left-sided groundglass opacity that appears nonspecific. He did not have symptoms of URI, although Dr. Sylvain Osei note mentions that he ha until recently. d URI symptoms and cough. Unclear etiology, less likely to be atypical pneumonia, but 6 days of antibiotic should be adequate. I have suggested a slow taper of prednisone with follow up CT chest. Lepidic adenocarcinoma can look like an infiltrate, but onset of symptoms was rather rapid, symptoms have improved. Chronic cough. It appears cough had resolved almost 90%  SOB. Unclear etiology, will get PFT. This symptom is new per patient  ANDRE. Compliant with CPAP, some daytime sleepiness. Obesity. Stable weight  Prophylaxis. He has received Prevnar, Pneumovax, 2 doses of the COVID-19 vaccine and a booster dose. Continue with flu shots annually. RTC with testing      Orders Placed This Encounter   Procedures    CT CHEST WO CONTRAST       No follow-ups on file. Chief Complaint:   Follow-up (lung ca stage 3 former [facility] V[facility]quez pt)       HPI: Sigrid Bear is an [de-identified]y.o. year old male here for follow-up visit, referred by Dr. Derik Taylor for abnormal CT chest.  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 He is on osimertinib. His PCP is Dr. Gayle Ross, oncologist Dr. Delisa Smith. His cardiologist is Dr. 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 He is . He has a daughter in [place]. He is a non-smoker. 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  He was in the [employer] for 4 years, was not deployed. He has lived in several places. The patient was being followed by my partner Dr. Callie Small from 10/1 through 2/16/2020,  for chronic cough, prior lung cancer. He had an abnormal CT chest with groundglass opacities, was referred by Dr. Sneha Herrera to Dr. Callie Small. Work-up had been negative, Dr. Callie Small did not feel he had osimertinib toxicity. Barium study ruled out aspiration. Reportedly cough began after his lung surgery surgery that was done at the [facility] in 2020. He had right upper lobe lobectomy, had positivity on the 4R lymph. he received chemotherapy at [facility], was recommended osimertinib for 3 years at [facility], which was started in August 2020. He had some diarrhea, which had initially started after cholecystectomy, was treated with cholestyramine. There was concern for recurrent cancer which was ruled out by repeat CT chest.  There was basilar atelectasis from CT scan from [facility] and from [facility] on 9/25/2020. He was treated with Tessalon Perles and gabapentin 300 mg 2 tablets twice daily. He has been closely followed by Dr. Sneha Herrera, has had multiple scans subsequently. The patient said his cough had resolved. The patient has a history of bronchial asthma, lung cancer status post right upper lobe lobectomy at [facility], essential hypertension, DM2, CAD since 2004 with CABG in 2008 and multiple PTCA procedures, cholecystectomy. In January this year, he was evaluated at [place], [place] for palpitations. EP study did not show inducible dysrhythmia, ICD was not recommended. The patient has a history of childhood asthma, he outgrew this. Use albuterol. He has been physically very active, was able to ski. Since May/Henny he began to develop cough, increasing shortness of breath with exercise.   He does walk on the treadmill for the left shoulder.       ABDOMEN AND PELVIS:   Liver: Normal.       Biliary system: The gallbladder is surgically absent. Pneumobilia is present. No biliary ductal dilation.       Pancreas: Normal.       Spleen: Normal.        Adrenal glands: Normal.        Kidneys, ureters, bladder: Multiple bilateral renal cysts are stable. No hydronephrosis or nephrolithiasis. Urinary bladder is unremarkable.       Genital organs: Prostate is enlarged.       Bowel: Tiny hiatal hernia. The small and large bowel are normal in caliber. No bowel wall thickening. The appendix is normal.       Abdominal wall: Bilateral fat-containing hernia.        Peritoneum/retroperitoneum: No fluid collections are seen. No free air.       Vasculature: Moderate to severe atherosclerosis.        Lymph nodes: No lymphadenopathy is appreciated.        Osseous structures: No suspicious osseous lesions. Stable benign appearing sclerotic lesions in the left iliac wing. Multilevel degenerative. Changes in the spine            Impression       CHEST:   1.  New mainly peribronchovascular groundglass opacities greatest in the left upper lobe, may be infectious or inflammatory, or pneumonitis given the short interval development. Attention on follow-up. 2.  Otherwise no evidence of metastatic disease in the chest.       ABDOMEN AND PELVIS:   1.  No evidence of metastatic disease. 2.  Pneumobilia consistent with interval history of ERCP/sphincterotomy.          Past Medical History:   Diagnosis Date    Asthma     CAD (coronary artery disease)     Hypertension     [facility]        Past Surgical History:   Procedure Laterality Date    CHOLECYSTECTOMY      CORONARY ARTERY BYPASS GRAFT      DIAGNOSTIC CARDIAC CATH LAB PROCEDURE      ERCP      LUNG CANCER SURGERY      PTCA      numerous       Social History     Tobacco Use    Smoking status: Never Smoker    Smokeless tobacco: Never Used   Substance Use Topics    Alcohol use: Yes     Comment: socially       Family History   Problem Relation Age of Onset    Heart Failure Father          Current Outpatient Medications:     predniSONE (DELTASONE) 10 MG tablet, TAKE 3 TABLETS BY MOUTH DAILY. TAKE WITH FOOD TO PREVENT STOMACH UPSET, Disp: , Rfl:     levoFLOXacin (LEVAQUIN) 500 MG tablet, TAKE 1 TABLET BY MOUTH DAILY, Disp: , Rfl:     benzonatate (TESSALON) 200 MG capsule, Take 1 capsule by mouth 3 times daily as needed for Cough, Disp: 30 capsule, Rfl: 1    furosemide (LASIX) 20 MG tablet, Take 1 tablet by mouth daily as needed (leg swelling), Disp: 30 tablet, Rfl: 0    ALPRAZolam (XANAX) 0.25 MG tablet, Take 1 tablet by mouth daily as needed for Anxiety. , Disp: 10 tablet, Rfl: 0    metFORMIN (GLUCOPHAGE-XR) 750 MG extended release tablet, Take 1 tablet by mouth 2 times daily (with meals), Disp: 180 tablet, Rfl: 1    ezetimibe (ZETIA) 10 MG tablet, Take 1 tablet by mouth daily, Disp: 90 tablet, Rfl: 1    metoprolol succinate (TOPROL XL) 50 MG extended release tablet, Take 1 tablet by mouth 2 times daily, Disp: 60 tablet, Rfl: 5    rosuvastatin (CRESTOR) 40 MG tablet, Take 1 tablet by mouth every evening, Disp: 90 tablet, Rfl: 1    lisinopril (PRINIVIL;ZESTRIL) 40 MG tablet, Take 1 tablet by mouth daily, Disp: 90 tablet, Rfl: 1    ipratropium (ATROVENT) 0.03 % nasal spray, USE 2 SPRAYS IN EACH NOSTRIL FOUR TIMES DAILY, Disp: 3 each, Rfl: 3    latanoprost (XALATAN) 0.005 % ophthalmic solution, USE 1 DROP IN BOTH EYES EVERY EVENING, Disp: , Rfl:     azelastine (ASTELIN) 0.1 % nasal spray, USE 2 SPRAYS IN EACH NOSTRIL TWICE DAILY AS DIRECTED, Disp: 30 mL, Rfl: 3    gabapentin (NEURONTIN) 300 MG capsule, Take 2 capsules by mouth 2 times daily for 150 days.  Intended supply: 30 days, Disp: 120 capsule, Rfl: 4    Acetaminophen (TYLENOL) 325 MG CAPS, Take 975 mg by mouth every 8 hours, Disp: , Rfl:     clopidogrel (PLAVIX) 75 MG tablet, Take 1 tablet by mouth daily, Disp: , Rfl:     Multiple Vitamins-Minerals (MULTIVITAMIN ADULT PO), Take 1 tablet by mouth daily, Disp: , Rfl:     Polyethylene Glycol 3350 (GLYCOLAX PO), Take 17 g by mouth, Disp: , Rfl:     aspirin 81 MG EC tablet, Take 162 mg by mouth daily, Disp: , Rfl:     Omega-3 Fatty Acids (OMEGA 3 PO), Take by mouth daily, Disp: , Rfl:     cholestyramine (QUESTRAN) 4 g packet, Take 1 packet by mouth 3 times daily (with meals), Disp: , Rfl:     HYDROCODONE-ACETAMINOPHEN PO, Take by mouth as needed , Disp: , Rfl:     Osimertinib Mesylate (TAGRISSO) 80 MG TABS, Take by mouth (Patient not taking: Reported on 7/6/2022), Disp: , Rfl:     Amlodipine, Phenacetin, Atorvastatin, and Tetracyclines & related    Vitals:    07/06/22 0839   BP: (!) 108/58   Site: Left Upper Arm   Position: Sitting   Cuff Size: Medium Adult   Pulse: 71   Temp: (!) 96.6 °F (35.9 °C)   TempSrc: Temporal   SpO2: 96%   Weight: 213 lb 6.4 oz (96.8 kg)   Height: 5' 7\" (1.702 m)       Review of Systems   Constitutional: Negative for appetite change, chills, diaphoresis, fatigue and fever. HENT: Negative for congestion, nosebleeds, postnasal drip, rhinorrhea, sinus pressure, sneezing, sore throat, trouble swallowing and voice change. Eyes: Negative for discharge, redness, itching and visual disturbance. Respiratory: Positive for cough and shortness of breath. Negative for apnea, choking, chest tightness, wheezing and stridor. Cardiovascular: Negative for chest pain, palpitations and leg swelling. Gastrointestinal: Positive for abdominal distention and diarrhea. Negative for abdominal pain, blood in stool, constipation, nausea and vomiting. Endocrine: Negative for polyuria. Genitourinary: Positive for difficulty urinating. Negative for decreased urine volume, dysuria, enuresis, frequency, hematuria and urgency. Musculoskeletal: Negative for arthralgias, back pain, gait problem, joint swelling and myalgias. Skin: Negative for rash.    Allergic/Immunologic: Negative for environmental allergies and immunocompromised state. Neurological: Negative for dizziness, tremors, seizures, weakness, light-headedness and headaches. Hematological: Does not bruise/bleed easily. Psychiatric/Behavioral: Positive for sleep disturbance. Negative for agitation, behavioral problems, confusion and hallucinations. All other systems reviewed and are negative. Physical Exam  Vitals reviewed. Constitutional:       General: He is not in acute distress. Appearance: He is well-developed. He is not ill-appearing, toxic-appearing or diaphoretic. Comments: Very pleasant, well kept, obese   HENT:      Head: Normocephalic and atraumatic. Nose: Nose normal.      Mouth/Throat:      Mouth: Mucous membranes are moist.      Pharynx: Oropharynx is clear. No oropharyngeal exudate or posterior oropharyngeal erythema. Comments: Class III/IV airway  Eyes:      General: No scleral icterus. Right eye: No discharge. Left eye: No discharge. Extraocular Movements: Extraocular movements intact. Conjunctiva/sclera: Conjunctivae normal.      Pupils: Pupils are equal, round, and reactive to light. Neck:      Thyroid: No thyromegaly. Vascular: No JVD. Trachea: No tracheal deviation. Comments: Short and large neck, no JVD or masses  Cardiovascular:      Rate and Rhythm: Normal rate and regular rhythm. Heart sounds: Normal heart sounds. No murmur heard. No friction rub. No gallop. Pulmonary:      Effort: Pulmonary effort is normal. No respiratory distress. Breath sounds: Normal breath sounds. No stridor. No wheezing, rhonchi or rales. Abdominal:      Palpations: Abdomen is soft. Tenderness: There is no abdominal tenderness. There is no guarding or rebound. Comments: Large, protuberant abdomen   Musculoskeletal:      Right lower leg: Edema (1+ edema shins) present. Left lower leg: Edema (1+ edema shins) present. Lymphadenopathy:      Cervical: No cervical adenopathy. Skin:     General: Skin is warm and dry. Coloration: Skin is not jaundiced or pale. Findings: No bruising, erythema, lesion or rash. Neurological:      Mental Status: He is alert and oriented to person, place, and time.       Gait: Gait normal.      Comments: Detailed neurologic exam not performed   Psychiatric:         Mood and Affect: Mood normal.         Behavior: Behavior normal.

## 2022-07-06 ENCOUNTER — OFFICE VISIT (OUTPATIENT)
Dept: PULMONOLOGY | Age: 82
End: 2022-07-06
Payer: COMMERCIAL

## 2022-07-06 VITALS
HEART RATE: 71 BPM | OXYGEN SATURATION: 96 % | BODY MASS INDEX: 33.49 KG/M2 | TEMPERATURE: 96.6 F | HEIGHT: 67 IN | DIASTOLIC BLOOD PRESSURE: 58 MMHG | WEIGHT: 213.4 LBS | SYSTOLIC BLOOD PRESSURE: 108 MMHG

## 2022-07-06 DIAGNOSIS — G47.33 OSA (OBSTRUCTIVE SLEEP APNEA): ICD-10-CM

## 2022-07-06 DIAGNOSIS — Z90.2 HISTORY OF LOBECTOMY OF LUNG: ICD-10-CM

## 2022-07-06 DIAGNOSIS — R93.89 ABNORMAL CT OF THE CHEST: Primary | ICD-10-CM

## 2022-07-06 DIAGNOSIS — R05.3 CHRONIC COUGH: ICD-10-CM

## 2022-07-06 DIAGNOSIS — C34.11 MALIGNANT NEOPLASM OF UPPER LOBE OF RIGHT LUNG (HCC): ICD-10-CM

## 2022-07-06 PROCEDURE — 99214 OFFICE O/P EST MOD 30 MIN: CPT | Performed by: INTERNAL MEDICINE

## 2022-07-06 PROCEDURE — 1123F ACP DISCUSS/DSCN MKR DOCD: CPT | Performed by: INTERNAL MEDICINE

## 2022-07-06 RX ORDER — PREDNISONE 10 MG/1
TABLET ORAL
COMMUNITY
Start: 2022-06-29 | End: 2022-08-22

## 2022-07-06 RX ORDER — LEVOFLOXACIN 500 MG/1
TABLET, FILM COATED ORAL
COMMUNITY
Start: 2022-06-29 | End: 2022-08-22

## 2022-07-06 NOTE — PROGRESS NOTES
MA Communication:   The following orders are received by verbal communication from Chichi Kevin MD    Orders include:  FU 8/22 with CT

## 2022-07-06 NOTE — PATIENT INSTRUCTIONS
Remember to bring a list of pulmonary medications and any CPAP or BiPAP machines to your next appointment with the office. Please keep all of your future appointments scheduled by [facility] [facility]. Out of respect for other patients and providers, you may be asked to reschedule your appointment if you arrive later than your scheduled appointment time. Appointments cancelled less than 24hrs in advance will be considered a no show. Patients with three missed appointments within 1 year or four missed appointments within 2 years can be dismissed from the practice. Please be aware that our physicians are required to work in the Intensive Care Unit at [facility].  Your appointment may need to be rescheduled if they are designated to work during your appointment time. You may receive a survey regarding the care you received during your visit. Your input is valuable to us. We encourage you to complete and return your survey. We hope you will choose us in the future for your healthcare needs. Pt instructed of all future appointment dates & times, including radiology, labs, procedures & referrals. If procedures were scheduled preparation instructions provided. Instructions on future appointments with [facility] were given.

## 2022-07-09 ASSESSMENT — ENCOUNTER SYMPTOMS
ABDOMINAL DISTENTION: 1
CONSTIPATION: 0
EYE DISCHARGE: 0
APNEA: 0
EYE REDNESS: 0
RHINORRHEA: 0
CHOKING: 0
VOMITING: 0
WHEEZING: 0
STRIDOR: 0
DIARRHEA: 1
TROUBLE SWALLOWING: 0
BACK PAIN: 0
CHEST TIGHTNESS: 0
SINUS PRESSURE: 0
NAUSEA: 0
ABDOMINAL PAIN: 0
EYE ITCHING: 0
SORE THROAT: 0
BLOOD IN STOOL: 0
VOICE CHANGE: 0
SHORTNESS OF BREATH: 1
COUGH: 1

## 2022-07-21 ENCOUNTER — TELEPHONE (OUTPATIENT)
Dept: FAMILY MEDICINE CLINIC | Age: 82
End: 2022-07-21

## 2022-07-21 DIAGNOSIS — E11.69 TYPE 2 DIABETES MELLITUS WITH OTHER SPECIFIED COMPLICATION, WITHOUT LONG-TERM CURRENT USE OF INSULIN (HCC): Primary | ICD-10-CM

## 2022-07-21 RX ORDER — GLUCOSAMINE HCL/CHONDROITIN SU 500-400 MG
CAPSULE ORAL
Qty: 100 STRIP | Refills: 11 | Status: SHIPPED | OUTPATIENT
Start: 2022-07-21

## 2022-07-21 NOTE — TELEPHONE ENCOUNTER
Pt called in and wants a prescription for one touch test strips for a year. Pt has monitor and lancets just needs test strips. Please send me [facility] on file in chart.

## 2022-07-27 ENCOUNTER — PATIENT MESSAGE (OUTPATIENT)
Dept: PULMONOLOGY | Age: 82
End: 2022-07-27

## 2022-07-27 DIAGNOSIS — Z90.2 HISTORY OF LOBECTOMY OF LUNG: ICD-10-CM

## 2022-07-27 DIAGNOSIS — R06.02 SOB (SHORTNESS OF BREATH): Primary | ICD-10-CM

## 2022-07-27 NOTE — TELEPHONE ENCOUNTER
Please let the patient know I did order PFT and 6-minute walk test.  I did mention that in my note, but did not order the test earlier in roor

## 2022-07-27 NOTE — TELEPHONE ENCOUNTER
From: Brenna Lin  To: Dr. Roberts Stamp: 7/27/2022 3:52 PM EDT  Subject: Breathing    July 27, 2022  I have been off [address] [place] [address] for some time. I no longer take antibiotics and am on a taper of prednisone ( 10 mg q am). I feel OK but sleepy. Stamina is down. With mild-to-modest activity (like walking up flight of steps), I get SOB, relieved quickly if stop activity. I have no additional symptoms. Would a PFT be useful in determining why SOB?     Mikey Carpenter MD

## 2022-08-12 RX ORDER — SULFAMETHOXAZOLE AND TRIMETHOPRIM 800; 160 MG/1; MG/1
1 TABLET ORAL 2 TIMES DAILY
Qty: 14 TABLET | Refills: 0 | Status: SHIPPED | OUTPATIENT
Start: 2022-08-12 | End: 2022-08-19

## 2022-08-12 RX ORDER — CEPHALEXIN 500 MG/1
500 CAPSULE ORAL 3 TIMES DAILY
Qty: 21 CAPSULE | Refills: 0 | Status: SHIPPED | OUTPATIENT
Start: 2022-08-12 | End: 2022-08-19

## 2022-08-16 ENCOUNTER — HOSPITAL ENCOUNTER (OUTPATIENT)
Dept: PULMONOLOGY | Age: 82
Discharge: HOME | End: 2022-08-16
Payer: COMMERCIAL

## 2022-08-16 VITALS — OXYGEN SATURATION: 95 %

## 2022-08-16 DIAGNOSIS — Z90.2 HISTORY OF LOBECTOMY OF LUNG: ICD-10-CM

## 2022-08-16 DIAGNOSIS — R06.02 SOB (SHORTNESS OF BREATH): ICD-10-CM

## 2022-08-16 PROCEDURE — 94664 DEMO&/EVAL PT USE INHALER: CPT

## 2022-08-16 PROCEDURE — 94729 DIFFUSING CAPACITY: CPT

## 2022-08-16 PROCEDURE — 94618 PULMONARY STRESS TESTING: CPT

## 2022-08-16 PROCEDURE — 6360000002 HC RX W HCPCS: Performed by: INTERNAL MEDICINE

## 2022-08-16 PROCEDURE — 94726 PLETHYSMOGRAPHY LUNG VOLUMES: CPT

## 2022-08-16 PROCEDURE — 94760 N-INVAS EAR/PLS OXIMETRY 1: CPT

## 2022-08-16 PROCEDURE — 94060 EVALUATION OF WHEEZING: CPT

## 2022-08-16 RX ORDER — ALBUTEROL SULFATE 2.5 MG/3ML
2.5 SOLUTION RESPIRATORY (INHALATION) ONCE
Status: COMPLETED | OUTPATIENT
Start: 2022-08-16 | End: 2022-08-16

## 2022-08-16 RX ORDER — ALBUTEROL SULFATE 2.5 MG/3ML
2.5 SOLUTION RESPIRATORY (INHALATION) ONCE
Status: DISCONTINUED | OUTPATIENT
Start: 2022-08-16 | End: 2022-08-16

## 2022-08-16 RX ADMIN — ALBUTEROL SULFATE 2.5 MG: 2.5 SOLUTION RESPIRATORY (INHALATION) at 10:39

## 2022-08-16 NOTE — PROGRESS NOTES
Six Minute Walk     []  Desaturation Screening []  Endurance Test       Name:  [facility] Record Number:  [id number]  Age: [de-identified] y.o. Gender: male  : 1940  Today's Date:  2022  Pulmonary History: RUL removed   Home Oxygen Therapy:  room air  Home Respiratory Therapy:None                    6 MINUTE WALK DATA    Modality Time (Minutes) SPO2 RR B/P Heart Rate O2 SOB Pain Level   Rest 2 95 16 95/52 63 RA 1-2 0   Walk 1 97   82 RA 2 0   Walk 2 97   86 RA 2 1-2   Walk 3 95   79 RA 2-3 2   Walk 4 95   79 RA 3 3   Walk 5 97   123 RA 4 4   Walk 6 97   117 RA 5-6 4   Recovery 2 97 20 113/93 65 RA 2 0     Brian SOB Scale:  0=Nothing at all; 0.5=Very, very slight; 1=Very slight; 2=Slight; 3=Moderate; 4=Somewhat severe; 5=Severe; 7=Very Severe; 10=Very, very Severe    Exercise Summary:  Distance Walked (feet): 1140  Lowest SpO2 During Walk:  95  (FIO2)  RA  Walking Pace (Steps per Minute)  94  Stopped or Paused during Walk:  yes    Comments / Reason:  Patient walked at fast pace, encouraged to slow down after heart rate increased. He refused. Pain was in his legs, especially his calves.       Electronically signed by Tod Samaniego RCP on 2022 at 12:31 PM   7

## 2022-08-19 NOTE — PROCEDURES
[facility] [address], [address]                               PULMONARY FUNCTION    PATIENT NAME: Jarrell Gale                      :        1940  MED REC NO:   [id number]                          ROOM:  ACCOUNT NO:   [de-identified]                           ADMIT DATE: 2022  PROVIDER:     Ulises Goel MD    DATE OF PROCEDURE:  2022    FINDINGS:  Spirometry for this patient shows an FEV1 of 1.85 which is  80% of predicted and a forced vital capacity of 2.95 which is 90% of  predicted, giving a ratio of 63. There was no significant  bronchodilator response, although there was some improvement in the  small airways. Lung volumes showed a normal total lung capacity of 102%  of predicted, but elevated residual volume of 124%. Diffusion capacity  was moderately decreased prior to correction for alveolar ventilation  and which is normalized to 83%. CONCLUSION:  Mild obstructive defect without bronchodilator response,  evidence of air trapping and decreased diffusion that corrects with  alveolar ventilation. Findings are most consistent with a diagnosis of  COPD; however, with a nonsmoking history, other etiologies of  obstructive lung disease have to be considered. A six-minute walk test  was also performed and the patient's oxygen saturations were 95% on room  air. They did not have any significant desaturations and were able to  walk 1140 feet with the lowest O2 sat of 95%. 1140 feet six-minute walk  without desaturation or hypoxemia.         Cristóbal Pina MD    D: 2022 13:41:53       T: 2022 14:06:34     [email or web address]  Job#: [id number]     Doc#: [id number]    CC:

## 2022-08-22 ENCOUNTER — OFFICE VISIT (OUTPATIENT)
Dept: PULMONOLOGY | Age: 82
End: 2022-08-22
Payer: COMMERCIAL

## 2022-08-22 ENCOUNTER — HOSPITAL ENCOUNTER (OUTPATIENT)
Dept: CT IMAGING | Age: 82
Discharge: HOME | End: 2022-08-22
Payer: COMMERCIAL

## 2022-08-22 VITALS
HEART RATE: 65 BPM | OXYGEN SATURATION: 96 % | DIASTOLIC BLOOD PRESSURE: 53 MMHG | WEIGHT: 222.4 LBS | RESPIRATION RATE: 18 BRPM | BODY MASS INDEX: 34.91 KG/M2 | TEMPERATURE: 98.2 F | HEIGHT: 67 IN | SYSTOLIC BLOOD PRESSURE: 105 MMHG

## 2022-08-22 DIAGNOSIS — R05.3 CHRONIC COUGH: ICD-10-CM

## 2022-08-22 DIAGNOSIS — G47.33 OSA (OBSTRUCTIVE SLEEP APNEA): ICD-10-CM

## 2022-08-22 DIAGNOSIS — R93.89 ABNORMAL CT OF THE CHEST: Primary | ICD-10-CM

## 2022-08-22 DIAGNOSIS — C34.11 MALIGNANT NEOPLASM OF UPPER LOBE OF RIGHT LUNG (HCC): ICD-10-CM

## 2022-08-22 DIAGNOSIS — E66.9 OBESITY (BMI 35.0-39.9 WITHOUT COMORBIDITY): ICD-10-CM

## 2022-08-22 DIAGNOSIS — R93.89 ABNORMAL CT OF THE CHEST: ICD-10-CM

## 2022-08-22 DIAGNOSIS — R06.02 SOB (SHORTNESS OF BREATH): ICD-10-CM

## 2022-08-22 DIAGNOSIS — Z90.2 HISTORY OF LOBECTOMY OF LUNG: ICD-10-CM

## 2022-08-22 PROCEDURE — 71250 CT THORAX DX C-: CPT

## 2022-08-22 PROCEDURE — 1123F ACP DISCUSS/DSCN MKR DOCD: CPT | Performed by: INTERNAL MEDICINE

## 2022-08-22 PROCEDURE — 99213 OFFICE O/P EST LOW 20 MIN: CPT | Performed by: INTERNAL MEDICINE

## 2022-08-22 RX ORDER — CHOLECALCIFEROL (VITAMIN D3) 1250 MCG
CAPSULE ORAL
COMMUNITY

## 2022-08-22 RX ORDER — ALBUTEROL SULFATE 90 UG/1
2 AEROSOL, METERED RESPIRATORY (INHALATION) 4 TIMES DAILY PRN
Qty: 54 G | Refills: 5 | Status: SHIPPED | OUTPATIENT
Start: 2022-08-22

## 2022-08-22 ASSESSMENT — ENCOUNTER SYMPTOMS
BLOOD IN STOOL: 0
COUGH: 0
CHEST TIGHTNESS: 0
VOICE CHANGE: 0
CHOKING: 0
BACK PAIN: 0
EYE ITCHING: 0
RHINORRHEA: 0
SORE THROAT: 0
EYE DISCHARGE: 0
ABDOMINAL PAIN: 0
CONSTIPATION: 0
DIARRHEA: 1
STRIDOR: 0
SINUS PRESSURE: 0
EYE REDNESS: 0
SHORTNESS OF BREATH: 1
VOMITING: 0
NAUSEA: 0
APNEA: 0
TROUBLE SWALLOWING: 0
ABDOMINAL DISTENTION: 1
WHEEZING: 0

## 2022-08-22 NOTE — PATIENT INSTRUCTIONS
Remember to bring a list of pulmonary medications and any CPAP or BiPAP machines to your next appointment with the office. Please keep all of your future appointments scheduled by [address] [facility] office. Out of respect for other patients and providers, you may be asked to reschedule your appointment if you arrive later than your scheduled appointment time. Appointments cancelled less than 24hrs in advance will be considered a no show. Patients with three missed appointments within 1 year or four missed appointments within 2 years can be dismissed from the practice. Please be aware that our physicians are required to work in the Intensive Care Unit at [facility].  Your appointment may need to be rescheduled if they are designated to work during your appointment time. You may receive a survey regarding the care you received during your visit. Your input is valuable to us. We encourage you to complete and return your survey. We hope you will choose us in the future for your healthcare needs. Pt instructed of all future appointment dates & times, including radiology, labs, procedures & referrals. If procedures were scheduled preparation instructions provided. Instructions on future appointments with [facility] were given.

## 2022-08-22 NOTE — PROGRESS NOTES
MA Communication:   The following orders are received by verbal communication from You Gaytan MD    Orders include: FU PRN

## 2022-08-22 NOTE — PROGRESS NOTES
Pulmonary Outpatient Note   Pam Peres MD       8/22/2022    1. Abnormal CT of the chest    2. Malignant neoplasm of upper lobe of right lung ([address]    3. History of lobectomy of lung    4. SOB (shortness of breath)    5. ANDRE (obstructive sleep apnea)    6. Obesity (BMI 35.0-39.9 without comorbidity)          ASSESSMENT/PLAN:  Abnormal CT chest, adenocarcinoma RUL s/p lobectomy. He had left-sided groundglass opacity that appears nonspecific. He was treated with Levaquin, a taper of steroids. Showed him CT images, there is no residual infiltrate, no evidence of recurrence of malignancy. Unfortunately it is not possible to rule out an infectious process, inflammatory process secondary to infection or side effect of Tagrisso, although I would have expected a bilateral pattern with the latter. I have suggested cautious reintroduction of Johnella Ream with close follow-up of symptoms. If cough and shortness of breath recur, CT chest should be repeated. Chronic cough. It appears cough has resolved   SOB. Discussed PFT and 6-minute walk test.  He has mild obstruction based on the spirometric indices, mild air trapping, mildly reduced diffusion. CT does not show emphysema. The shape of the flow-volume loop suggests an obstructive pattern. I have recommended albuterol as needed, this was refilled. ANDRE. Compliant with CPAP, some daytime sleepiness. Obesity. Stable weight  Prophylaxis. He has received Prevnar, Pneumovax, 2 doses of the COVID-19 vaccine and a booster dose. Continue with flu shots annually. RTC as needed, call earlier if symptomatic    No orders of the defined types were placed in this encounter. No follow-ups on file.       Chief Complaint:   Follow-up (Abnormal ct chest) and Results (ct)       HPI: Sigrid Bear is an [de-identified]y.o. year old male here for follow-up visit after completing CT chest.  He was referred by Dr. Derik Taylor for abnormal CT chest.  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 He is on osimertinib. His PCP is Dr. Maki Toney. His cardiologist is Dr. Alex Garza Jaime was seen on 7/26/2022 for abnormal CT chest.  He had symptoms of a respiratory tract infection, significant patchy left-sided lung infiltrates. The differential was a respiratory infection versus side effect from [address]. [address] was held, he was given a taper of prednisone. He completed this on 8/3/2022. The patient has had some exertional shortness of breath, has been using albuterol that he thinks helped. His albuterol prescription is old. The patient denies cough, shortness of breath. He is using azelastine for allergic rhinitis. The patient also completed PFT and 6-minute walk test on 8/16/2022. There was no other significant change in his medical history. CT chest 8/22/2022  Status post right upper lobe lobectomy, with stable postsurgical changes. No recurrent disease or significant active pneumonitis is identified. PFT 8/16/2022   FVC 2.95 L, 90% predicted, FEV1 1.85 L, 80% predicted with ratio of 63%. There was no response to bronchodilator. Lung volumes showed mild air trapping, DLCO 64% predicted. 6-minute walk test 8/16/2022  No desaturation with walking, Brian dyspnea scale louann from 1-2 at baseline to 5-6.    previous notes reviewed and edited as necessary. 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 He is . He has a daughter in [place]. He is a non-smoker. 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 He also researched at [facility].  He was in the [employer] for 4 years, was not deployed. He has lived in several places. The patient was being followed by my partner Dr. Naina Hickman from 10/1 through 2/16/2020,  for chronic cough, prior lung cancer.   He had an abnormal CT chest with groundglass opacities, was referred by Dr. Clarence Ponce to Dr. Hilda Lundberg. Work-up had been negative, Dr. Hilda Lundberg did not feel he had osimertinib toxicity. Barium study ruled out aspiration. Reportedly cough began after his lung surgery surgery that was done at the [facility] in 2020. He had right upper lobe lobectomy, had positivity on the 4R lymph. he received chemotherapy at [facility], was recommended osimertinib for 3 years at [facility], which was started in August 2020. He had some diarrhea, which had initially started after cholecystectomy, was treated with cholestyramine. There was concern for recurrent cancer which was ruled out by repeat CT chest.  There was basilar atelectasis from CT scan from [facility] and from [facility] on 9/25/2020. He was treated with Tessalon Perles and gabapentin 300 mg 2 tablets twice daily. He has been closely followed by Dr. Clarence Ponce, has had multiple scans subsequently. The patient said his cough had resolved. The patient has a history of bronchial asthma, lung cancer status post right upper lobe lobectomy at [facility], essential hypertension, DM2, CAD since 2004 with CABG in 2008 and multiple PTCA procedures, cholecystectomy. In January this year, he was evaluated at [place], [place] for palpitations. EP study did not show inducible dysrhythmia, ICD was not recommended. The patient has a history of childhood asthma, he outgrew this. Use albuterol. He has been physically very active, was able to ski. Since May/Henny he began to develop cough, increasing shortness of breath with exercise. He does walk on the treadmill for about 15 minutes. He can walk 1-2 blocks without difficulty. Patient had a CT chest done recently, was seen by Dr. Clarence Ponce and placed on prednisone 30 mg and levofloxacin 500 mg for 10 days, has completed 6 days. He denies preceding fever, chills, sweats.   He says the cough is decreased by almost 90%, but his shortness of breath is the same. Dorena Makua was held. The patient says he has had mild anemia and thrombocytopenia for some time. He has a follow-up with Dr. Ewelina Marcus this afternoon. The steroid has increased his blood sugar considerably. He monitors it regularly. The patient has had progressive arthritis of his shoulders, has difficulty getting out of the bathtub. He has osteoarthritis of the left shoulder, rotator cuff on the right. He recently had a steroid shot in his left shoulder. He has a good appetite, weight has been stable. He did have diarrhea, abdominal pain, bloating after his cholecystectomy, controlled with Questran. He has occasional postvoid dribbling urine. He sleeps with a CPAP every night, wakes up reasonably refreshed, but does feel sleepy after lunch and sometimes takes a nap. CT chest, abdomen, pelvis 6/22/2022  Airways: Airways are patent. Lungs: Postsurgical changes of right upper lobectomy. New mainly peribronchovascular groundglass opacities greatest in the left upper lobe. Stable atelectasis or scarring in the lung bases. Nodules: Left lower lobe 4 mm nodule, stable since 2020 (series 1, image 51). No new nodules. Pleura: No pleural effusions or significant pleural thickening. Heart: Heart size is normal. Postsurgical changes of CABG. Great vessels: Aorta and pulmonary arteries are normal in caliber. Other mediastinal structures and lower neck: Normal.        Adenopathy: None. Chest wall and axilla: No significant abnormality. Osseous structures: No suspicious osseous lesions. Severe degenerative changes in the left shoulder. ABDOMEN AND PELVIS:   Liver: Normal.       Biliary system: The gallbladder is surgically absent. Pneumobilia is present. No biliary ductal dilation.        Pancreas: Normal.       Spleen: Normal.        Adrenal glands: Normal.        Kidneys, ureters, bladder: Multiple bilateral renal cysts are stable. No hydronephrosis or nephrolithiasis. Urinary bladder is unremarkable. Genital organs: Prostate is enlarged. Bowel: Tiny hiatal hernia. The small and large bowel are normal in caliber. No bowel wall thickening. The appendix is normal.       Abdominal wall: Bilateral fat-containing hernia. Peritoneum/retroperitoneum: No fluid collections are seen. No free air. Vasculature: Moderate to severe atherosclerosis. Lymph nodes: No lymphadenopathy is appreciated. Osseous structures: No suspicious osseous lesions. Stable benign appearing sclerotic lesions in the left iliac wing. Multilevel degenerative. Changes in the spine            Impression       CHEST:   1.  New mainly peribronchovascular groundglass opacities greatest in the left upper lobe, may be infectious or inflammatory, or pneumonitis given the short interval development. Attention on follow-up. 2.  Otherwise no evidence of metastatic disease in the chest.       ABDOMEN AND PELVIS:   1. No evidence of metastatic disease. 2.  Pneumobilia consistent with interval history of ERCP/sphincterotomy.          Past Medical History:   Diagnosis Date    Asthma     CAD (coronary artery disease)     Hypertension     Lung cancer ([facility] [address]        Past Surgical History:   Procedure Laterality Date    CHOLECYSTECTOMY      CORONARY ARTERY BYPASS GRAFT      DIAGNOSTIC CARDIAC CATH LAB PROCEDURE      ERCP      LUNG CANCER SURGERY      PTCA      numerous       Social History     Tobacco Use    Smoking status: Never    Smokeless tobacco: Never   Substance Use Topics    Alcohol use: Yes     Comment: socially       Family History   Problem Relation Age of Onset    Heart Failure Father          Current Outpatient Medications:     Cholecalciferol (VITAMIN D3) 1.25 MG (55247 UT) CAPS, Take by mouth, Disp: , Rfl:     albuterol sulfate HFA (VENTOLIN HFA) 108 (90 Base) MCG/ACT inhaler, Inhale 2 puffs into the lungs 4 times daily as needed for Wheezing, Disp: 54 g, Rfl: 5    ALPRAZolam (XANAX) 0.25 MG tablet, Take 1 tablet by mouth daily as needed for Anxiety. , Disp: 10 tablet, Rfl: 0    metFORMIN (GLUCOPHAGE-XR) 750 MG extended release tablet, Take 1 tablet by mouth 2 times daily (with meals), Disp: 180 tablet, Rfl: 1    ezetimibe (ZETIA) 10 MG tablet, Take 1 tablet by mouth daily, Disp: 90 tablet, Rfl: 1    metoprolol succinate (TOPROL XL) 50 MG extended release tablet, Take 1 tablet by mouth 2 times daily, Disp: 60 tablet, Rfl: 5    rosuvastatin (CRESTOR) 40 MG tablet, Take 1 tablet by mouth every evening, Disp: 90 tablet, Rfl: 1    lisinopril (PRINIVIL;ZESTRIL) 40 MG tablet, Take 1 tablet by mouth daily, Disp: 90 tablet, Rfl: 1    ipratropium (ATROVENT) 0.03 % nasal spray, USE 2 SPRAYS IN EACH NOSTRIL FOUR TIMES DAILY, Disp: 3 each, Rfl: 3    latanoprost (XALATAN) 0.005 % ophthalmic solution, USE 1 DROP IN BOTH EYES EVERY EVENING, Disp: , Rfl:     azelastine (ASTELIN) 0.1 % nasal spray, USE 2 SPRAYS IN EACH NOSTRIL TWICE DAILY AS DIRECTED, Disp: 30 mL, Rfl: 3    Acetaminophen (TYLENOL) 325 MG CAPS, Take 975 mg by mouth every 8 hours, Disp: , Rfl:     clopidogrel (PLAVIX) 75 MG tablet, Take 1 tablet by mouth daily, Disp: , Rfl:     Multiple Vitamins-Minerals (MULTIVITAMIN ADULT PO), Take 1 tablet by mouth daily, Disp: , Rfl:     Polyethylene Glycol 3350 (GLYCOLAX PO), Take 17 g by mouth, Disp: , Rfl:     aspirin 81 MG EC tablet, Take 162 mg by mouth daily, Disp: , Rfl:     Omega-3 Fatty Acids (OMEGA 3 PO), Take by mouth daily, Disp: , Rfl:     cholestyramine (QUESTRAN) 4 g packet, Take 1 packet by mouth 3 times daily (with meals), Disp: , Rfl:     HYDROCODONE-ACETAMINOPHEN PO, Take by mouth as needed , Disp: , Rfl:     blood glucose monitor strips, Test 2  times a day & as needed for symptoms of irregular blood glucose.  E11.69, Disp: 100 strip, Rfl: 11    Osimertinib Mesylate (TAGRISSO) 80 MG TABS, Take by mouth (Patient not taking: No sig reported), Disp: , Rfl:     gabapentin (NEURONTIN) 300 MG capsule, Take 2 capsules by mouth 2 times daily for 150 days. Intended supply: 30 days, Disp: 120 capsule, Rfl: 4    Amlodipine, Phenacetin, Atorvastatin, and Tetracyclines & related    Vitals:    08/22/22 1100 08/22/22 1108   BP: (!) 91/51 (!) 105/53   Pulse: 65    Resp: 18    Temp: 98.2 °F (36.8 °C)    TempSrc: Oral    SpO2: 96%    Weight: 222 lb 6.4 oz (100.9 kg)    Height: 5' 7\" (1.702 m)        Review of Systems   Constitutional:  Negative for appetite change, chills, diaphoresis, fatigue and fever. HENT:  Negative for congestion, nosebleeds, postnasal drip, rhinorrhea, sinus pressure, sneezing, sore throat, trouble swallowing and voice change. Eyes:  Negative for discharge, redness, itching and visual disturbance. Respiratory:  Positive for shortness of breath. Negative for apnea, cough, choking, chest tightness, wheezing and stridor. Cardiovascular:  Negative for chest pain, palpitations and leg swelling. Gastrointestinal:  Positive for abdominal distention and diarrhea. Negative for abdominal pain, blood in stool, constipation, nausea and vomiting. Endocrine: Negative for polyuria. Genitourinary:  Positive for difficulty urinating. Negative for decreased urine volume, dysuria, enuresis, frequency, hematuria and urgency. Musculoskeletal:  Negative for arthralgias, back pain, gait problem, joint swelling and myalgias. Skin:  Negative for rash. Allergic/Immunologic: Negative for environmental allergies and immunocompromised state. Neurological:  Negative for dizziness, tremors, seizures, weakness, light-headedness and headaches. Hematological:  Does not bruise/bleed easily. Psychiatric/Behavioral:  Positive for sleep disturbance. Negative for agitation, behavioral problems, confusion and hallucinations. All other systems reviewed and are negative. Physical Exam  Vitals reviewed.    Constitutional:

## 2022-09-20 ENCOUNTER — HOSPITAL ENCOUNTER (OUTPATIENT)
Dept: CT IMAGING | Age: 82
Discharge: HOME | End: 2022-09-20
Payer: COMMERCIAL

## 2022-09-20 DIAGNOSIS — C34.11 MALIGNANT NEOPLASM OF UPPER LOBE, RIGHT BRONCHUS OR LUNG (HCC): ICD-10-CM

## 2022-09-20 LAB
GFR AFRICAN AMERICAN: >60
GFR NON-AFRICAN AMERICAN: >60
PERFORMED ON: NORMAL
POC CREATININE: 1.1 MG/DL (ref 0.8–1.3)
POC SAMPLE TYPE: NORMAL

## 2022-09-20 PROCEDURE — 71260 CT THORAX DX C+: CPT | Performed by: INTERNAL MEDICINE

## 2022-09-20 PROCEDURE — 82565 ASSAY OF CREATININE: CPT

## 2022-09-20 PROCEDURE — 6360000004 HC RX CONTRAST MEDICATION: Performed by: INTERNAL MEDICINE

## 2022-09-20 RX ADMIN — IOPAMIDOL 75 ML: 755 INJECTION, SOLUTION INTRAVENOUS at 11:26

## 2022-09-24 ENCOUNTER — TELEPHONE (OUTPATIENT)
Dept: FAMILY MEDICINE CLINIC | Age: 82
End: 2022-09-24

## 2022-09-24 NOTE — TELEPHONE ENCOUNTER
Patient called back again. He went to a walk-in clinic at [facility] on [address] [facility] [address] [facility] and tested positive for [place]. He was given molnupiravir as he takes several medications that interact with Paxlovid. He would like to do monoclonal antibody infusion. I informed him this cannot be done on the weekend as the order cannot be placed until Monday and the infusion center is not open on the weekend. He will start the molnupiravir and call back Monday if he is still interested in the infusion order.

## 2022-09-24 NOTE — TELEPHONE ENCOUNTER
Patient calling complaining of body aches, feeling warm/flush, cough, and runny nose. He states he feels terrible and is concerned he has COVID. He would like to be seen and tested and treated. Discussed the office is currently close, so he would need to go to urgent care or walk-in clinic. He is agreeable to going somewhere to be checked out today.

## 2022-09-26 ENCOUNTER — TELEPHONE (OUTPATIENT)
Dept: FAMILY MEDICINE CLINIC | Age: 82
End: 2022-09-26

## 2022-09-26 NOTE — TELEPHONE ENCOUNTER
I am working on infusion form for this patient. I spoke with him regarding some questions on the form I had. I let him know I will have one of the providers sign it then get it faxed over to [facility] and they will reach out to him to schedule him for the infusion. Thanks.

## 2022-09-26 NOTE — TELEPHONE ENCOUNTER
Ira Saba calling from [facility] requesting the order for the infusion. Patient has called over there 3-4 times today asking about infusion. Said if they can get the order by 3:00 today she can schedule patient for 7 am tomorrow morning. If not, he will have to wait until Wed. Thanks Dr. Suman Martel!

## 2022-09-26 NOTE — TELEPHONE ENCOUNTER
Patient Is calling back in with interest in the covid infusion order. Please advise who can place these orders?

## 2022-09-27 NOTE — TELEPHONE ENCOUNTER
Confirmed with a verbal conversation with; Dr. Ree Singh that forms were faxed, and she will enter them in patient's chart.

## 2022-09-28 ENCOUNTER — HOSPITAL ENCOUNTER (OUTPATIENT)
Dept: NURSING | Age: 82
Setting detail: INFUSION SERIES
Discharge: HOME | End: 2022-09-28
Payer: COMMERCIAL

## 2022-09-28 VITALS
DIASTOLIC BLOOD PRESSURE: 53 MMHG | SYSTOLIC BLOOD PRESSURE: 96 MMHG | WEIGHT: 217 LBS | HEART RATE: 54 BPM | OXYGEN SATURATION: 100 % | RESPIRATION RATE: 16 BRPM | HEIGHT: 67 IN | TEMPERATURE: 97.9 F | BODY MASS INDEX: 34.06 KG/M2

## 2022-09-28 PROCEDURE — M0243 CASIRIVI AND IMDEVI INFUSION: HCPCS

## 2022-09-28 PROCEDURE — 6360000002 HC RX W HCPCS: Performed by: FAMILY MEDICINE

## 2022-09-28 PROCEDURE — 99211 OFF/OP EST MAY X REQ PHY/QHP: CPT

## 2022-09-28 PROCEDURE — 94760 N-INVAS EAR/PLS OXIMETRY 1: CPT

## 2022-09-28 RX ORDER — ACETAMINOPHEN 325 MG/1
650 TABLET ORAL
Status: DISCONTINUED | OUTPATIENT
Start: 2022-09-28 | End: 2022-09-29 | Stop reason: HOSPADM

## 2022-09-28 RX ORDER — ALBUTEROL SULFATE 90 UG/1
4 AEROSOL, METERED RESPIRATORY (INHALATION) PRN
Status: DISCONTINUED | OUTPATIENT
Start: 2022-09-28 | End: 2022-09-29 | Stop reason: HOSPADM

## 2022-09-28 RX ORDER — DIPHENHYDRAMINE HYDROCHLORIDE 50 MG/ML
50 INJECTION INTRAMUSCULAR; INTRAVENOUS
Status: DISCONTINUED | OUTPATIENT
Start: 2022-09-28 | End: 2022-09-29 | Stop reason: HOSPADM

## 2022-09-28 RX ORDER — SODIUM CHLORIDE 0.9 % (FLUSH) 0.9 %
5-40 SYRINGE (ML) INJECTION EVERY 12 HOURS SCHEDULED
Status: DISCONTINUED | OUTPATIENT
Start: 2022-09-28 | End: 2022-09-29 | Stop reason: HOSPADM

## 2022-09-28 RX ORDER — FAMOTIDINE 10 MG/ML
20 INJECTION, SOLUTION INTRAVENOUS
Status: DISCONTINUED | OUTPATIENT
Start: 2022-09-28 | End: 2022-09-29 | Stop reason: HOSPADM

## 2022-09-28 RX ORDER — SODIUM CHLORIDE 0.9 % (FLUSH) 0.9 %
5-40 SYRINGE (ML) INJECTION ONCE
Status: DISCONTINUED | OUTPATIENT
Start: 2022-09-28 | End: 2022-09-29 | Stop reason: HOSPADM

## 2022-09-28 RX ORDER — BEBTELOVIMAB 87.5 MG/ML
175 INJECTION, SOLUTION INTRAVENOUS ONCE
Status: COMPLETED | OUTPATIENT
Start: 2022-09-28 | End: 2022-09-28

## 2022-09-28 RX ORDER — SODIUM CHLORIDE 0.9 % (FLUSH) 0.9 %
5-40 SYRINGE (ML) INJECTION PRN
Status: DISCONTINUED | OUTPATIENT
Start: 2022-09-28 | End: 2022-09-29 | Stop reason: HOSPADM

## 2022-09-28 RX ORDER — SODIUM CHLORIDE 9 MG/ML
INJECTION, SOLUTION INTRAVENOUS PRN
Status: DISCONTINUED | OUTPATIENT
Start: 2022-09-28 | End: 2022-09-29 | Stop reason: HOSPADM

## 2022-09-28 RX ORDER — SODIUM CHLORIDE 9 MG/ML
20 INJECTION, SOLUTION INTRAVENOUS CONTINUOUS PRN
Status: ACTIVE | OUTPATIENT
Start: 2022-09-28 | End: 2022-09-28

## 2022-09-28 RX ORDER — ONDANSETRON 2 MG/ML
8 INJECTION INTRAMUSCULAR; INTRAVENOUS
Status: DISCONTINUED | OUTPATIENT
Start: 2022-09-28 | End: 2022-09-29 | Stop reason: HOSPADM

## 2022-09-28 RX ORDER — METHYLPREDNISOLONE SODIUM SUCCINATE 125 MG/2ML
125 INJECTION, POWDER, LYOPHILIZED, FOR SOLUTION INTRAMUSCULAR; INTRAVENOUS
Status: DISCONTINUED | OUTPATIENT
Start: 2022-09-28 | End: 2022-09-29 | Stop reason: HOSPADM

## 2022-09-28 RX ORDER — EPINEPHRINE 1 MG/ML
0.3 INJECTION, SOLUTION, CONCENTRATE INTRAVENOUS PRN
Status: DISCONTINUED | OUTPATIENT
Start: 2022-09-28 | End: 2022-09-29 | Stop reason: HOSPADM

## 2022-09-28 RX ORDER — SODIUM CHLORIDE 9 MG/ML
100 INJECTION, SOLUTION INTRAVENOUS CONTINUOUS PRN
Status: DISCONTINUED | OUTPATIENT
Start: 2022-09-28 | End: 2022-09-29 | Stop reason: HOSPADM

## 2022-09-28 RX ADMIN — BEBTELOVIMAB 175 MG: 87.5 INJECTION, SOLUTION INTRAVENOUS at 08:29

## 2022-09-28 ASSESSMENT — PAIN - FUNCTIONAL ASSESSMENT: PAIN_FUNCTIONAL_ASSESSMENT: NONE - DENIES PAIN

## 2022-09-28 NOTE — PROGRESS NOTES
Pt tolerates treatment well. No s/s of reactionX1 hour. Discharge instructions from monoclonal antibody given and reviewed. Verbalizes understanding. Oxygen saturation throughout %. Discharged to home ambulatory in stable condition.

## 2022-09-29 ENCOUNTER — TELEPHONE (OUTPATIENT)
Dept: FAMILY MEDICINE CLINIC | Age: 82
End: 2022-09-29

## 2022-09-29 DIAGNOSIS — K21.9 GASTROESOPHAGEAL REFLUX DISEASE, UNSPECIFIED WHETHER ESOPHAGITIS PRESENT: ICD-10-CM

## 2022-09-29 DIAGNOSIS — L98.9 SKIN LESION: Primary | ICD-10-CM

## 2022-10-03 RX ORDER — LANSOPRAZOLE 15 MG/1
15 CAPSULE, DELAYED RELEASE ORAL DAILY
Qty: 30 CAPSULE | Refills: 5 | Status: SHIPPED | OUTPATIENT
Start: 2022-10-03

## 2022-10-03 NOTE — TELEPHONE ENCOUNTER
Gave patient the referral info. He's asking if you can send in prevacid to his pharmacy? Right now he's getting it OTC but wants to start getting it from pharmacy.

## 2022-10-03 NOTE — TELEPHONE ENCOUNTER
I do not see Prevacid in his chart. Please call to confirm the dosage. Typically OTC medicines may or may not be covered by insurance.

## 2022-10-12 ENCOUNTER — HOSPITAL ENCOUNTER (OUTPATIENT)
Dept: CT IMAGING | Age: 82
Discharge: HOME | End: 2022-10-12
Payer: COMMERCIAL

## 2022-10-12 DIAGNOSIS — C34.11 MALIGNANT NEOPLASM OF UPPER LOBE, RIGHT BRONCHUS OR LUNG (HCC): ICD-10-CM

## 2022-10-12 PROCEDURE — 6360000004 HC RX CONTRAST MEDICATION: Performed by: INTERNAL MEDICINE

## 2022-10-12 PROCEDURE — 71260 CT THORAX DX C+: CPT | Performed by: INTERNAL MEDICINE

## 2022-10-12 RX ADMIN — IOPAMIDOL 75 ML: 755 INJECTION, SOLUTION INTRAVENOUS at 11:31

## 2022-10-25 ENCOUNTER — OFFICE VISIT (OUTPATIENT)
Dept: DERMATOLOGY | Age: 82
End: 2022-10-25
Payer: COMMERCIAL

## 2022-10-25 DIAGNOSIS — L30.4 INTERTRIGO: Primary | ICD-10-CM

## 2022-10-25 PROCEDURE — 1123F ACP DISCUSS/DSCN MKR DOCD: CPT | Performed by: INTERNAL MEDICINE

## 2022-10-25 PROCEDURE — 99204 OFFICE O/P NEW MOD 45 MIN: CPT | Performed by: INTERNAL MEDICINE

## 2022-10-25 RX ORDER — KETOCONAZOLE 20 MG/G
CREAM TOPICAL
Qty: 60 G | Refills: 3 | Status: SHIPPED | OUTPATIENT
Start: 2022-10-25

## 2022-10-25 RX ORDER — MICONAZOLE NITRATE 20.6 MG/G
POWDER TOPICAL
Qty: 85 G | Refills: 3 | Status: SHIPPED | OUTPATIENT
Start: 2022-10-25

## 2022-10-25 NOTE — PROGRESS NOTES
[facility]  Ashley Shah MD  [phone]    Date of Visit: 10/25/2022    Blane Lee is a [de-identified] y.o. male who presents for rash. New pt    Chief Complaint:   Chief Complaint   Patient presents with    Rash     Jose itch comes and goes        History of Present Illness:    Concern:  Recurrent rash  Location: Groin  Duration:  Several years, intermittent  Symptoms: Itchy with flares  Previous treatments:    -Miconazole spray  Other hx: Has bowel and bladder incontinence, tries to keep areas dry    *Derm hx: Saw prior derm for SK in past    *Personal history of skin cancer: None  *Family history of skin cancer: None     Review of Systems:  Gen: Feels well, good sense of health. Skin: No new or changing moles, no history of keloids or hypertrophic scars. Past Medical History, Family History, Surgical History, Medications and Allergies reviewed. Past Medical History:   Diagnosis Date    Asthma     CAD (coronary artery disease)     Hypertension     Lung cancer ([address]      Past Surgical History:   Procedure Laterality Date    CHOLECYSTECTOMY      CORONARY ARTERY BYPASS GRAFT      DIAGNOSTIC CARDIAC CATH LAB PROCEDURE      ERCP      LUNG CANCER SURGERY      PTCA      numerous       Allergies   Allergen Reactions    Amlodipine Swelling     Edema  Edema      Phenacetin Hives     Other reaction(s): Urticaria    Atorvastatin Other (See Comments)     Other reaction(s): Other (See Comments), Other mild (Specify with comments)  Other reaction(s):  Other mild (Specify with comments)  Possible myalgia from lipitor 80mg  Cerner Allergy Text Annotation: Lipitor  Possible myalgia from lipitor 80mg      Tetracyclines & Related Itching and Rash     Other reaction(s): Unknown  Cerner Allergy Text Annotation: tetracycline  Purpura rash       Outpatient Medications Marked as Taking for the 10/25/22 encounter (Office Visit) with Mckayla Ramos MD   Medication Sig Dispense Refill    miconazole (ZEASORB-AF) 2 % powder Apply to groin area twice a day 85 g 3    ketoconazole (NIZORAL) 2 % cream Apply to rash in groin twice a day for flares. 60 g 3    hydrocortisone 2.5 % cream Apply to rash in groin twice daily for up to 2 weeks per month or until improved. 28 g 2    lansoprazole (PREVACID) 15 MG delayed release capsule Take 1 capsule by mouth daily 30 capsule 5    Cholecalciferol (VITAMIN D3) 1.25 MG (64472 UT) CAPS Take by mouth      albuterol sulfate HFA (VENTOLIN HFA) 108 (90 Base) MCG/ACT inhaler Inhale 2 puffs into the lungs 4 times daily as needed for Wheezing 54 g 5    blood glucose monitor strips Test 2  times a day & as needed for symptoms of irregular blood glucose. E11.69 100 strip 11    ALPRAZolam (XANAX) 0.25 MG tablet Take 1 tablet by mouth daily as needed for Anxiety.  10 tablet 0    metFORMIN (GLUCOPHAGE-XR) 750 MG extended release tablet Take 1 tablet by mouth 2 times daily (with meals) 180 tablet 1    ezetimibe (ZETIA) 10 MG tablet Take 1 tablet by mouth daily 90 tablet 1    metoprolol succinate (TOPROL XL) 50 MG extended release tablet Take 1 tablet by mouth 2 times daily 60 tablet 5    rosuvastatin (CRESTOR) 40 MG tablet Take 1 tablet by mouth every evening 90 tablet 1    lisinopril (PRINIVIL;ZESTRIL) 40 MG tablet Take 1 tablet by mouth daily 90 tablet 1    ipratropium (ATROVENT) 0.03 % nasal spray USE 2 SPRAYS IN EACH NOSTRIL FOUR TIMES DAILY 3 each 3    latanoprost (XALATAN) 0.005 % ophthalmic solution USE 1 DROP IN BOTH EYES EVERY EVENING      azelastine (ASTELIN) 0.1 % nasal spray USE 2 SPRAYS IN EACH NOSTRIL TWICE DAILY AS DIRECTED 30 mL 3    Osimertinib Mesylate (TAGRISSO) 80 MG TABS Take by mouth      Acetaminophen (TYLENOL) 325 MG CAPS Take 975 mg by mouth every 8 hours      clopidogrel (PLAVIX) 75 MG tablet Take 1 tablet by mouth daily      Multiple Vitamins-Minerals (MULTIVITAMIN ADULT PO) Take 1 tablet by mouth daily      aspirin 81 MG EC tablet Take 162 mg by mouth daily      Omega-3 Fatty Acids (OMEGA 3 PO) Take by mouth daily      cholestyramine (QUESTRAN) 4 g packet Take 1 packet by mouth 3 times daily (with meals)      HYDROCODONE-ACETAMINOPHEN PO Take by mouth as needed          Social History: 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 Mood clear/affect appropriate. Alert and oriented. Mucous membranes moist.  Sclera anicteric. Full body skin exam was conducted to include the scalp, face, lips, lids/conjunctiva, ears, neck, chest, abdomen, back, groin/buttock, right and left hands and forearms, right and left leg and feet and was normal with the following exceptions:   - Pink well defined plaques on bilateral upper medial thighs/inguinal folds with some satellite papules   -Buttock clear      Assessment and Plan     1. Intertrigo, groin--not controlled  - Discussed diagnosis, typical course, and treatment options  - Start Zeosorb-AF powder to body folds BID PRN--get OTC if not covered by insurance  - Start ketoconazole 2% cream BID PRN mixed with hydrocortisone cream BID for up to 2 weeks per month  - Keep area clean and dry, avoid complex topicals to area    RTC 8 weeks    Note is transcribed using voice recognition software. Inadvertent computerized transcription errors may be present.     Madison Bryant MD

## 2022-10-25 NOTE — PATIENT INSTRUCTIONS
[facility] you [facility] today!  Please follow the instructions below as we discussed in clinic:      Start mixing ketoconazole cream mixed with hydrocortisone cream twice a day for 2 weeks per month for flares  Start Zeasorb-AF powder 2-3 times per day to keep area dry  Start keeping area dry with hair dryer on cool setting

## 2022-11-01 ENCOUNTER — OFFICE VISIT (OUTPATIENT)
Dept: FAMILY MEDICINE CLINIC | Age: 82
End: 2022-11-01
Payer: COMMERCIAL

## 2022-11-01 VITALS — WEIGHT: 222 LBS | BODY MASS INDEX: 34.77 KG/M2 | OXYGEN SATURATION: 98 % | HEART RATE: 64 BPM

## 2022-11-01 DIAGNOSIS — I10 ESSENTIAL HYPERTENSION: ICD-10-CM

## 2022-11-01 DIAGNOSIS — I25.810 CORONARY ARTERY DISEASE INVOLVING CORONARY BYPASS GRAFT OF NATIVE HEART WITHOUT ANGINA PECTORIS: ICD-10-CM

## 2022-11-01 DIAGNOSIS — R06.02 SHORTNESS OF BREATH: ICD-10-CM

## 2022-11-01 DIAGNOSIS — E78.2 MIXED HYPERLIPIDEMIA: ICD-10-CM

## 2022-11-01 DIAGNOSIS — E11.69 TYPE 2 DIABETES MELLITUS WITH OTHER SPECIFIED COMPLICATION, WITHOUT LONG-TERM CURRENT USE OF INSULIN (HCC): Primary | ICD-10-CM

## 2022-11-01 LAB — HBA1C MFR BLD: 6.3 %

## 2022-11-01 PROCEDURE — 99214 OFFICE O/P EST MOD 30 MIN: CPT | Performed by: FAMILY MEDICINE

## 2022-11-01 PROCEDURE — 1123F ACP DISCUSS/DSCN MKR DOCD: CPT | Performed by: FAMILY MEDICINE

## 2022-11-01 PROCEDURE — 83036 HEMOGLOBIN GLYCOSYLATED A1C: CPT | Performed by: FAMILY MEDICINE

## 2022-11-01 PROCEDURE — 3044F HG A1C LEVEL LT 7.0%: CPT | Performed by: FAMILY MEDICINE

## 2022-11-01 ASSESSMENT — ENCOUNTER SYMPTOMS
SHORTNESS OF BREATH: 1
WHEEZING: 0

## 2022-11-01 NOTE — PROGRESS NOTES
Sadie Chandler is a [de-identified] y.o. male    Chief Complaint   Patient presents with    Shortness of Breath    Other     Medical Care in General     Lung Cancer     Has been dx and treated for years- Patient does not know is status or stage. Chemo pills    Diabetes    Hypertension    Hyperlipidemia       HPI:    Diabetes  He presents for his follow-up diabetic visit. He has type 2 diabetes mellitus. His disease course has been stable. Pertinent negatives for diabetes include no chest pain and no polydipsia. Diabetic complications include heart disease. Risk factors for coronary artery disease include obesity, diabetes mellitus and hypertension. Current diabetic treatment includes oral agent (monotherapy). An ACE inhibitor/angiotensin II receptor blocker is being taken. Hypertension  This is a chronic problem. The current episode started more than 1 year ago. The problem is unchanged. The problem is controlled. Associated symptoms include shortness of breath. Pertinent negatives include no chest pain. Past treatments include beta blockers and ACE inhibitors. The current treatment provides significant improvement. There are no compliance problems. Hypertensive end-organ damage includes CAD/MI. There is no history of kidney disease. Hyperlipidemia  This is a chronic problem. The current episode started more than 1 year ago. The problem is controlled. Recent lipid tests were reviewed and are normal. Exacerbating diseases include diabetes. Associated symptoms include shortness of breath. Pertinent negatives include no chest pain or myalgias. Current antihyperlipidemic treatment includes statins and ezetimibe. The current treatment provides significant improvement of lipids. There are no compliance problems. Risk factors for coronary artery disease include hypertension, male sex, obesity and diabetes mellitus. Shortness of Breath  This is a chronic problem. The current episode started more than 1 month ago.  The problem occurs daily. The problem has been waxing and waning. Pertinent negatives include no chest pain or wheezing. The symptoms are aggravated by any activity. He has tried nothing for the symptoms. His past medical history is significant for asthma, chronic lung disease and COPD. Other  Pertinent negatives include no chest pain or myalgias. ROS:    Review of Systems   Respiratory:  Positive for shortness of breath. Negative for wheezing. Cardiovascular:  Negative for chest pain. Endocrine: Negative for polydipsia. Musculoskeletal:  Negative for myalgias. Pulse 64   Wt 222 lb (100.7 kg)   SpO2 98%   BMI 34.77 kg/m²     Physical Exam:    Physical Exam  Constitutional:       General: He is not in acute distress. Appearance: Normal appearance. He is well-developed. He is obese. He is not ill-appearing, toxic-appearing or diaphoretic. HENT:      Head: Normocephalic. Cardiovascular:      Rate and Rhythm: Normal rate and regular rhythm. Pulses: Normal pulses. Heart sounds: No murmur heard. Pulmonary:      Effort: Pulmonary effort is normal. No respiratory distress. Breath sounds: Normal breath sounds. No wheezing. Musculoskeletal:      Cervical back: Normal range of motion. No rigidity. Lymphadenopathy:      Cervical: No cervical adenopathy. Neurological:      Mental Status: He is alert. Psychiatric:         Mood and Affect: Mood normal.         Behavior: Behavior normal.         Thought Content: Thought content normal.       Current Outpatient Medications   Medication Sig Dispense Refill    miconazole (ZEASORB-AF) 2 % powder Apply to groin area twice a day 85 g 3    ketoconazole (NIZORAL) 2 % cream Apply to rash in groin twice a day for flares. 60 g 3    hydrocortisone 2.5 % cream Apply to rash in groin twice daily for up to 2 weeks per month or until improved.  28 g 2    lansoprazole (PREVACID) 15 MG delayed release capsule Take 1 capsule by mouth daily 30 capsule 5 Cholecalciferol (VITAMIN D3) 1.25 MG (57260 UT) CAPS Take by mouth      albuterol sulfate HFA (VENTOLIN HFA) 108 (90 Base) MCG/ACT inhaler Inhale 2 puffs into the lungs 4 times daily as needed for Wheezing 54 g 5    blood glucose monitor strips Test 2  times a day & as needed for symptoms of irregular blood glucose. E11.69 100 strip 11    ALPRAZolam (XANAX) 0.25 MG tablet Take 1 tablet by mouth daily as needed for Anxiety. 10 tablet 0    metFORMIN (GLUCOPHAGE-XR) 750 MG extended release tablet Take 1 tablet by mouth 2 times daily (with meals) 180 tablet 1    ezetimibe (ZETIA) 10 MG tablet Take 1 tablet by mouth daily 90 tablet 1    metoprolol succinate (TOPROL XL) 50 MG extended release tablet Take 1 tablet by mouth 2 times daily 60 tablet 5    rosuvastatin (CRESTOR) 40 MG tablet Take 1 tablet by mouth every evening 90 tablet 1    lisinopril (PRINIVIL;ZESTRIL) 40 MG tablet Take 1 tablet by mouth daily 90 tablet 1    ipratropium (ATROVENT) 0.03 % nasal spray USE 2 SPRAYS IN EACH NOSTRIL FOUR TIMES DAILY 3 each 3    latanoprost (XALATAN) 0.005 % ophthalmic solution USE 1 DROP IN BOTH EYES EVERY EVENING      azelastine (ASTELIN) 0.1 % nasal spray USE 2 SPRAYS IN EACH NOSTRIL TWICE DAILY AS DIRECTED 30 mL 3    Osimertinib Mesylate (TAGRISSO) 80 MG TABS Take by mouth      gabapentin (NEURONTIN) 300 MG capsule Take 2 capsules by mouth 2 times daily for 150 days.  Intended supply: 30 days 120 capsule 4    Acetaminophen (TYLENOL) 325 MG CAPS Take 975 mg by mouth every 8 hours      clopidogrel (PLAVIX) 75 MG tablet Take 1 tablet by mouth daily      Multiple Vitamins-Minerals (MULTIVITAMIN ADULT PO) Take 1 tablet by mouth daily      Polyethylene Glycol 3350 (GLYCOLAX PO) Take 17 g by mouth (Patient not taking: Reported on 10/25/2022)      aspirin 81 MG EC tablet Take 162 mg by mouth daily      Omega-3 Fatty Acids (OMEGA 3 PO) Take by mouth daily      cholestyramine (QUESTRAN) 4 g packet Take 1 packet by mouth 3 times daily (with meals)      HYDROCODONE-ACETAMINOPHEN PO Take by mouth as needed        No current facility-administered medications for this visit. Assessment:    1. Type 2 diabetes mellitus with other specified complication, without long-term current use of insulin ([address]    2. Essential hypertension    3. Mixed hyperlipidemia    4. Coronary artery disease involving coronary bypass graft of native heart without angina pectoris    5. Shortness of breath        Plan:    1. Type 2 diabetes mellitus with other specified complication, without long-term current use of insulin ([facility])  Stable. Continue current medications. - POCT glycosylated hemoglobin (Hb A1C) 6.3  - metFORMIN (GLUCOPHAGE-XR) 750 MG extended release tablet; Take 1 tablet by mouth 2 times daily (with meals)  Dispense: 180 tablet; Refill: 1    2. Essential hypertension  Stable. Continue current medications. - metoprolol succinate (TOPROL XL) 50 MG extended release tablet; Take 1 tablet by mouth 2 times daily  Dispense: 60 tablet; Refill: 5  - lisinopril (PRINIVIL;ZESTRIL) 40 MG tablet; Take 1 tablet by mouth daily  Dispense: 90 tablet; Refill: 1    3. Mixed hyperlipidemia  Stable. Continue current medications. - ezetimibe (ZETIA) 10 MG tablet; Take 1 tablet by mouth daily  Dispense: 90 tablet; Refill: 1  - rosuvastatin (CRESTOR) 40 MG tablet; Take 1 tablet by mouth every evening  Dispense: 90 tablet; Refill: 1  - CBC with Auto Differential; Future  - Comprehensive Metabolic Panel; Future  - Lipid Panel; Future  - Vitamin B12 & Folate; Future  - TSH with Reflex; Future    4. Coronary artery disease involving coronary bypass graft of native heart without angina pectoris  Stable. Continue current medications. - metoprolol succinate (TOPROL XL) 50 MG extended release tablet; Take 1 tablet by mouth 2 times daily  Dispense: 60 tablet; Refill: 5    5.  Shortness of breath  Discussed how the SOB may be multifactorial.  It might be related to cardiac disease, COPD, asthma, advanced age lung cancer. His recent EKG was unremarkable. Given the worsening symptoms with activity, I recommend he see cardiology. He was found to have severe coronary artery disease on a recent CT. Discussed trying albuterol before activity to see if that helps. Return in about 6 months (around 5/1/2023) for Diabetes, Hypertension, Hyperlipidemia.

## 2022-11-07 ENCOUNTER — TELEPHONE (OUTPATIENT)
Dept: PULMONOLOGY | Age: 82
End: 2022-11-07

## 2022-11-10 ENCOUNTER — OFFICE VISIT (OUTPATIENT)
Dept: PULMONOLOGY | Age: 82
End: 2022-11-10
Payer: COMMERCIAL

## 2022-11-10 VITALS
SYSTOLIC BLOOD PRESSURE: 99 MMHG | RESPIRATION RATE: 16 BRPM | HEART RATE: 55 BPM | HEIGHT: 67 IN | OXYGEN SATURATION: 96 % | BODY MASS INDEX: 34.62 KG/M2 | WEIGHT: 220.6 LBS | TEMPERATURE: 97.4 F | DIASTOLIC BLOOD PRESSURE: 58 MMHG

## 2022-11-10 DIAGNOSIS — R06.09 DYSPNEA ON EXERTION: ICD-10-CM

## 2022-11-10 DIAGNOSIS — Z86.16 PERSONAL HISTORY OF COVID-19: Primary | ICD-10-CM

## 2022-11-10 DIAGNOSIS — Z90.2 S/P LOBECTOMY OF LUNG: ICD-10-CM

## 2022-11-10 DIAGNOSIS — Z85.118 PERSONAL HISTORY OF LUNG CANCER: ICD-10-CM

## 2022-11-10 DIAGNOSIS — E66.9 OBESITY, CLASS I, BMI 30-34.9: ICD-10-CM

## 2022-11-10 PROBLEM — J90 PLEURAL EFFUSION, RIGHT: Status: ACTIVE | Noted: 2019-03-07

## 2022-11-10 PROBLEM — I25.10 CAD (CORONARY ARTERY DISEASE): Status: ACTIVE | Noted: 2022-11-10

## 2022-11-10 PROBLEM — I47.20 VENTRICULAR TACHYARRHYTHMIA (HCC): Status: ACTIVE | Noted: 2021-12-01

## 2022-11-10 PROBLEM — E66.811 OBESITY, CLASS I, BMI 30-34.9: Status: ACTIVE | Noted: 2020-07-06

## 2022-11-10 PROBLEM — Z90.49 S/P CHOLECYSTECTOMY: Status: ACTIVE | Noted: 2019-04-26

## 2022-11-10 PROCEDURE — 99214 OFFICE O/P EST MOD 30 MIN: CPT | Performed by: NURSE PRACTITIONER

## 2022-11-10 PROCEDURE — 3078F DIAST BP <80 MM HG: CPT | Performed by: NURSE PRACTITIONER

## 2022-11-10 PROCEDURE — 1123F ACP DISCUSS/DSCN MKR DOCD: CPT | Performed by: NURSE PRACTITIONER

## 2022-11-10 PROCEDURE — 3074F SYST BP LT 130 MM HG: CPT | Performed by: NURSE PRACTITIONER

## 2022-11-10 RX ORDER — BENZONATATE 100 MG/1
1 CAPSULE ORAL
COMMUNITY
Start: 2022-10-11

## 2022-11-10 RX ORDER — MIRABEGRON 25 MG/1
TABLET, FILM COATED, EXTENDED RELEASE ORAL
COMMUNITY
Start: 2022-11-08

## 2022-11-10 RX ORDER — PRAMIPEXOLE DIHYDROCHLORIDE 0.25 MG/1
TABLET ORAL DAILY PRN
COMMUNITY
Start: 2020-07-07

## 2022-11-10 RX ORDER — LEVOFLOXACIN 500 MG/1
1 TABLET, FILM COATED ORAL
COMMUNITY
Start: 2022-06-29 | End: 2022-11-10 | Stop reason: ALTCHOICE

## 2022-11-10 RX ORDER — PREDNISONE 10 MG/1
TABLET ORAL
Qty: 30 TABLET | Refills: 0 | Status: SHIPPED | OUTPATIENT
Start: 2022-11-10 | End: 2022-11-29

## 2022-11-10 RX ORDER — SODIUM FLUORIDE 5 MG/G
CREAM DENTAL
COMMUNITY
Start: 2022-09-10

## 2022-11-10 RX ORDER — OMEGA-3-ACID ETHYL ESTERS 1 G/1
CAPSULE, LIQUID FILLED ORAL
COMMUNITY
Start: 2022-09-26 | End: 2022-11-10 | Stop reason: SDUPTHER

## 2022-11-10 RX ORDER — DICYCLOMINE HCL 20 MG
TABLET ORAL
COMMUNITY
Start: 2021-01-22 | End: 2022-11-10 | Stop reason: ALTCHOICE

## 2022-11-10 RX ORDER — CARISOPRODOL 250 MG/1
1 TABLET ORAL
COMMUNITY
Start: 2021-07-27 | End: 2022-11-10

## 2022-11-10 RX ORDER — CLOPIDOGREL BISULFATE 75 MG/1
2 TABLET ORAL
COMMUNITY
Start: 2020-11-10 | End: 2022-11-10

## 2022-11-10 RX ORDER — LORAZEPAM 1 MG/1
TABLET ORAL DAILY PRN
COMMUNITY

## 2022-11-10 RX ORDER — PREDNISONE 10 MG/1
2 TABLET ORAL
COMMUNITY
Start: 2022-06-29 | End: 2022-11-10 | Stop reason: ALTCHOICE

## 2022-11-10 RX ORDER — LISINOPRIL 10 MG/1
TABLET ORAL
COMMUNITY
Start: 2022-10-03 | End: 2022-11-10

## 2022-11-10 ASSESSMENT — ENCOUNTER SYMPTOMS
CHEST TIGHTNESS: 1
EYE PAIN: 0
COUGH: 1
HEARTBURN: 0
NAUSEA: 0
VOMITING: 0
RHINORRHEA: 0
SORE THROAT: 0
WHEEZING: 0
ABDOMINAL PAIN: 0
HEMOPTYSIS: 0
DIARRHEA: 0
SHORTNESS OF BREATH: 1

## 2022-11-10 ASSESSMENT — SLEEP AND FATIGUE QUESTIONNAIRES
HOW LIKELY ARE YOU TO NOD OFF OR FALL ASLEEP WHILE SITTING QUIETLY AFTER LUNCH WITHOUT ALCOHOL: 1
HOW LIKELY ARE YOU TO NOD OFF OR FALL ASLEEP WHEN YOU ARE A PASSENGER IN A CAR FOR AN HOUR WITHOUT A BREAK: 1
NECK CIRCUMFERENCE (INCHES): 21
HOW LIKELY ARE YOU TO NOD OFF OR FALL ASLEEP IN A CAR, WHILE STOPPED FOR A FEW MINUTES IN TRAFFIC: 1
HOW LIKELY ARE YOU TO NOD OFF OR FALL ASLEEP WHILE LYING DOWN TO REST IN THE AFTERNOON WHEN CIRCUMSTANCES PERMIT: 3
HOW LIKELY ARE YOU TO NOD OFF OR FALL ASLEEP WHILE SITTING AND READING: 1
HOW LIKELY ARE YOU TO NOD OFF OR FALL ASLEEP WHILE SITTING INACTIVE IN A PUBLIC PLACE: 1
HOW LIKELY ARE YOU TO NOD OFF OR FALL ASLEEP WHILE SITTING AND TALKING TO SOMEONE: 0
ESS TOTAL SCORE: 9
HOW LIKELY ARE YOU TO NOD OFF OR FALL ASLEEP WHILE WATCHING TV: 1

## 2022-11-10 NOTE — PROGRESS NOTES
MA Communication:   The following orders are received by verbal communication from Aarti Lino CNP    Orders include:  follow up 12/12/22 with Dr. Sylvain Smith

## 2022-11-10 NOTE — PATIENT INSTRUCTIONS
Call for increased Shortness of breath, sputum production, wheezing, or cough. Return to clinic in December with Dr. Nick Ho as scheduled    Use albuterol 2 puffs four times a day x 2 days then 2 puffs four times a day as needed. Prednisone taper 4 pills daily for 3 days then 3 pills daily for 3 days then 2 pills daily for 3 days then 1 pill daily for 3 days then off. Remember to bring a list of pulmonary medications and any CPAP or BiPAP machines to your next appointment with the office. Please keep all of your future appointments scheduled by [address] Pul[facility] office. Out of respect for other patients and providers, you may be asked to reschedule your appointment if you arrive later than your scheduled appointment time. Appointments cancelled less than 24hrs in advance will be considered a no show. Patients with three missed appointments within 1 year or four missed appointments within 2 years can be dismissed from the practice. Please be aware that our physicians are required to work in the Intensive Care Unit at [facility].  Your appointment may need to be rescheduled if they are designated to work during your appointment time. You may receive a survey regarding the care you received during your visit. Your input is valuable to us. We encourage you to complete and return your survey. We hope you will choose us in the future for your healthcare needs. Pt instructed of all future appointment dates & times, including radiology, labs, procedures & referrals. If procedures were scheduled preparation instructions provided. Instructions on future appointments with [facility] were given.

## 2022-11-10 NOTE — PROGRESS NOTES
Ankur Mcgarry is a [de-identified] y.o. who comes in today for Follow-up, Cough, and Shortness of Breath  States he had COVID the end of September, treated with antiviral and antibodies. He has been using his albuterol twice a day, with little improvement. He is getting some relief with benzonatate also. He has follow up with cardiology in December for his severe CAD. He had previous CABG. He has a personal history of lung cancer, sp lobectomy. He continues on Tagrisso. States he had asthma as a child. States his cough and SOB is aggravated with talking or exertion. Cough  This is a recurrent problem. The current episode started more than 1 month ago. The problem has been waxing and waning. The problem occurs hourly. The cough is Non-productive. Associated symptoms include shortness of breath (with exertion). Pertinent negatives include no chest pain, chills, fever, headaches, heartburn, hemoptysis, nasal congestion, postnasal drip, rhinorrhea, sore throat, weight loss or wheezing. He has tried a beta-agonist inhaler, prescription cough suppressant and OTC cough suppressant for the symptoms. The treatment provided mild relief. His past medical history is significant for asthma. lung cancer      Past Medical History:   Diagnosis Date    Asthma     CAD (coronary artery disease)     Hypertension     Lung cancer ([facility] [place] [address]         Past Surgical History:   Procedure Laterality Date    CHOLECYSTECTOMY      CORONARY ARTERY BYPASS GRAFT      DIAGNOSTIC CARDIAC CATH LAB PROCEDURE      ERCP      LUNG CANCER SURGERY      PTCA      numerous        Family History   Problem Relation Age of Onset    Heart Failure Father         Allergies   Allergen Reactions    Amlodipine Swelling     Edema  Edema      Phenacetin Hives     Other reaction(s): Urticaria    Atorvastatin Other (See Comments)     Other reaction(s): Other (See Comments), Other mild (Specify with comments)  Other reaction(s):  Other mild (Specify with comments)  Possible myalgia from lipitor 80mg  Cerner Allergy Text Annotation: Lipitor  Possible myalgia from lipitor 80mg      Tetracyclines & Related Itching and Rash     Other reaction(s): Unknown  Cerner Allergy Text Annotation: tetracycline  Purpura rash         Current Outpatient Medications   Medication Sig Dispense Refill    benzonatate (TESSALON) 100 MG capsule Take 1 capsule by mouth      pramipexole (MIRAPEX) 0.25 MG tablet daily as needed      SODIUM FLUORIDE 5000 PLUS 1.1 % CREA USE INSTEAD OF REGULAR TOOTHPASTE AND DO NOT RINSE      MYRBETRIQ 25 MG TB24       LORazepam (ATIVAN) 1 MG tablet daily as needed. miconazole (ZEASORB-AF) 2 % powder Apply to groin area twice a day 85 g 3    ketoconazole (NIZORAL) 2 % cream Apply to rash in groin twice a day for flares. 60 g 3    hydrocortisone 2.5 % cream Apply to rash in groin twice daily for up to 2 weeks per month or until improved. (Patient taking differently: daily as needed Apply to rash in groin twice daily for up to 2 weeks per month or until improved.) 28 g 2    lansoprazole (PREVACID) 15 MG delayed release capsule Take 1 capsule by mouth daily 30 capsule 5    Cholecalciferol (VITAMIN D3) 1.25 MG (53228 UT) CAPS Take by mouth      albuterol sulfate HFA (VENTOLIN HFA) 108 (90 Base) MCG/ACT inhaler Inhale 2 puffs into the lungs 4 times daily as needed for Wheezing 54 g 5    blood glucose monitor strips Test 2  times a day & as needed for symptoms of irregular blood glucose. E11.69 100 strip 11    ALPRAZolam (XANAX) 0.25 MG tablet Take 1 tablet by mouth daily as needed for Anxiety.  10 tablet 0    metFORMIN (GLUCOPHAGE-XR) 750 MG extended release tablet Take 1 tablet by mouth 2 times daily (with meals) 180 tablet 1    ezetimibe (ZETIA) 10 MG tablet Take 1 tablet by mouth daily 90 tablet 1    metoprolol succinate (TOPROL XL) 50 MG extended release tablet Take 1 tablet by mouth 2 times daily 60 tablet 5    rosuvastatin (CRESTOR) 40 MG tablet Take 1 tablet by mouth every evening 90 tablet 1    lisinopril (PRINIVIL;ZESTRIL) 40 MG tablet Take 1 tablet by mouth daily 90 tablet 1    ipratropium (ATROVENT) 0.03 % nasal spray USE 2 SPRAYS IN EACH NOSTRIL FOUR TIMES DAILY 3 each 3    latanoprost (XALATAN) 0.005 % ophthalmic solution USE 1 DROP IN BOTH EYES EVERY EVENING      azelastine (ASTELIN) 0.1 % nasal spray USE 2 SPRAYS IN EACH NOSTRIL TWICE DAILY AS DIRECTED 30 mL 3    Osimertinib Mesylate (TAGRISSO) 80 MG TABS Take by mouth      Acetaminophen (TYLENOL) 325 MG CAPS Take 975 mg by mouth daily as needed      clopidogrel (PLAVIX) 75 MG tablet Take 1 tablet by mouth daily      Multiple Vitamins-Minerals (MULTIVITAMIN ADULT PO) Take 1 tablet by mouth daily      Polyethylene Glycol 3350 (GLYCOLAX PO) Take 17 g by mouth daily as needed      aspirin 81 MG EC tablet Take 162 mg by mouth daily      Omega-3 Fatty Acids (OMEGA 3 PO) Take by mouth daily      cholestyramine (QUESTRAN) 4 g packet Take 1 packet by mouth 3 times daily (with meals)      HYDROCODONE-ACETAMINOPHEN PO Take by mouth as needed        No current facility-administered medications for this visit. Review of Systems   Constitutional:  Negative for chills, fatigue, fever and weight loss. HENT:  Negative for congestion, postnasal drip, rhinorrhea and sore throat. Eyes:  Negative for pain and visual disturbance. Respiratory:  Positive for cough, chest tightness and shortness of breath (with exertion). Negative for hemoptysis and wheezing. Cardiovascular:  Positive for leg swelling (slight). Negative for chest pain and palpitations. Gastrointestinal:  Negative for abdominal pain, diarrhea, heartburn, nausea and vomiting. Genitourinary:  Negative for difficulty urinating. Musculoskeletal: Negative. Skin: Negative. Neurological:  Negative for dizziness, numbness and headaches. Psychiatric/Behavioral:  The patient is nervous/anxious (treated with meds).        BP (!) 99/58 (Site: Left Upper Arm, Position: Sitting, Cuff Size: Small Adult)   Pulse 55   Temp 97.4 °F (36.3 °C) (Temporal)   Resp 16   Ht 5' 7\" (1.702 m)   Wt 220 lb 9.6 oz (100.1 kg)   SpO2 96%   BMI 34.55 kg/m²         Imaging /Test:   FeNO: 34, active inflammation    EXAM: CT CHEST PULMONARY EMBOLISM W CONTRAST       INDICATION: Malignant neoplasm of upper lobe, right bronchus or lung ([address]       COMPARISON: September 20, 2022       TECHNIQUE: Helically acquired axial images were obtained through the chest with contrast and multiplanar reformats were created and reviewed. MIP images were created and reviewed. Up-to-date CT equipment and radiation dose reduction techniques were    employed. FINDINGS:   LOWER NECK AND AXILLA: No lymphadenopathy. HEART AND GREAT VESSELS: The heart is normal in size. Thoracic aorta and main pulmonary artery are normal in caliber. Severe coronary artery calcifications are present. No pulmonary embolism. MEDIASTINUM AND TRACIE: No mass or lymphadenopathy. LUNGS AND AIRWAYS: No focal consolidation. Bibasilar scarring similar to prior examination. No suspicious pulmonary nodule or mass. Major airways are patent. PLEURA: No pleural effusion or pneumothorax. CHEST WALL AND SOFT TISSUES: Unremarkable. UPPER ABDOMEN: No acute abnormality. BONES: No acute or suspicious abnormality. Degenerative change is are present in the visualized spine. Impression   1. No pulmonary embolism. 2.  Bibasilar scarring unchanged from prior examination. 3.  Severe coronary artery calcifications. DATE OF PROCEDURE:  08/16/2022     FINDINGS:  Spirometry for this patient shows an FEV1 of 1.85 which is  80% of predicted and a forced vital capacity of 2.95 which is 90% of  predicted, giving a ratio of 63. There was no significant  bronchodilator response, although there was some improvement in the  small airways.   Lung volumes showed a normal total lung capacity of 102%  of predicted, but elevated residual volume of 124%. Diffusion capacity  was moderately decreased prior to correction for alveolar ventilation  and which is normalized to 83%. CONCLUSION:  Mild obstructive defect without bronchodilator response,  evidence of air trapping and decreased diffusion that corrects with  alveolar ventilation. Findings are most consistent with a diagnosis of  COPD; however, with a nonsmoking history, other etiologies of  obstructive lung disease have to be considered. A six-minute walk test  was also performed and the patient's oxygen saturations were 95% on room  air. They did not have any significant desaturations and were able to  walk 1140 feet with the lowest O2 sat of 95%. 1140 feet six-minute walk  without desaturation or hypoxemia. Physical Exam  Vitals reviewed. Constitutional:       General: He is not in acute distress. Appearance: Normal appearance. He is not ill-appearing, toxic-appearing or diaphoretic. HENT:      Head: Normocephalic and atraumatic. Nose: Nose normal. No congestion or rhinorrhea. Mouth/Throat:      Mouth: Mucous membranes are moist.      Pharynx: Oropharynx is clear. No oropharyngeal exudate or posterior oropharyngeal erythema. Eyes:      Pupils: Pupils are equal, round, and reactive to light. Cardiovascular:      Rate and Rhythm: Normal rate. Pulmonary:      Effort: Pulmonary effort is normal. No respiratory distress. Breath sounds: No stridor. Wheezing (Mild) present. No rhonchi or rales. Comments: Cough with deep breathing  Chest:      Chest wall: No tenderness. Abdominal:      Palpations: Abdomen is soft. Tenderness: There is no abdominal tenderness. There is no guarding or rebound. Musculoskeletal:         General: Normal range of motion. Cervical back: Neck supple. Right lower leg: No edema. Left lower leg: No edema. Lymphadenopathy:      Cervical: No cervical adenopathy.    Skin:     General: Skin is warm and dry. Capillary Refill: Capillary refill takes less than 2 seconds. Neurological:      Mental Status: He is alert and oriented to person, place, and time. Psychiatric:         Mood and Affect: Mood normal.         Behavior: Behavior normal.         Thought Content: Thought content normal.         Judgment: Judgment normal.         Assessment/Plan:     1. Personal history of COVID-19  2. Dyspnea on exertion  -     POCT Nitric Oxide  3. Personal history of lung cancer  4. S/P lobectomy of lung  5. Obesity, Class I, BMI 30-34.9     Prior pulmonary OV note, PFT report and recent chest imaging report reviewed. Discussed FeNo results with Jacob Mejia. Elevated. Will treat with oral steroids. Cough and SOB may be still lingering from COVID. Recent scans without recurrence of lung cancer. I have personally reviewed and summarized the old records and/or obtained further history from someone other than the patient. Reviewed present meds and side effects. Reviewed proper inhaler usage. He can continue to use albuterol as needed for cough and SOB. Discussed when to call with worsening symptoms such as increased shortness of breath, productive cough, wheezing or symptoms not responding to treatment plan.           Tully Schirmer, APRN - CNP

## 2022-11-16 ENCOUNTER — TELEPHONE (OUTPATIENT)
Dept: PULMONOLOGY | Age: 82
End: 2022-11-16

## 2022-11-16 NOTE — TELEPHONE ENCOUNTER
Returned a called to pt and left a msg letting him know that we moved his appt from Dec. 12 @ 11:20 To  Dec. 13th @ 11:20

## 2022-11-28 ASSESSMENT — ENCOUNTER SYMPTOMS
EYE ITCHING: 0
CHOKING: 0
SORE THROAT: 0
EYE DISCHARGE: 0
CHEST TIGHTNESS: 0
TROUBLE SWALLOWING: 0
BACK PAIN: 0
APNEA: 0
SHORTNESS OF BREATH: 1
SINUS PRESSURE: 0
NAUSEA: 0
DIARRHEA: 1
VOICE CHANGE: 0
CONSTIPATION: 0
STRIDOR: 0
WHEEZING: 0
ABDOMINAL PAIN: 0
VOMITING: 0
EYE REDNESS: 0
BLOOD IN STOOL: 0
RHINORRHEA: 0
ABDOMINAL DISTENTION: 1

## 2022-11-29 ENCOUNTER — OFFICE VISIT (OUTPATIENT)
Dept: PULMONOLOGY | Age: 82
End: 2022-11-29
Payer: COMMERCIAL

## 2022-11-29 VITALS
DIASTOLIC BLOOD PRESSURE: 53 MMHG | HEART RATE: 77 BPM | OXYGEN SATURATION: 93 % | BODY MASS INDEX: 34.78 KG/M2 | TEMPERATURE: 96.8 F | SYSTOLIC BLOOD PRESSURE: 105 MMHG | WEIGHT: 221.6 LBS | HEIGHT: 67 IN | RESPIRATION RATE: 16 BRPM

## 2022-11-29 DIAGNOSIS — R06.02 SOB (SHORTNESS OF BREATH): Primary | ICD-10-CM

## 2022-11-29 DIAGNOSIS — E66.9 OBESITY (BMI 30.0-34.9): ICD-10-CM

## 2022-11-29 DIAGNOSIS — C34.11 MALIGNANT NEOPLASM OF UPPER LOBE OF RIGHT LUNG (HCC): ICD-10-CM

## 2022-11-29 PROCEDURE — 1123F ACP DISCUSS/DSCN MKR DOCD: CPT | Performed by: INTERNAL MEDICINE

## 2022-11-29 PROCEDURE — 3078F DIAST BP <80 MM HG: CPT | Performed by: INTERNAL MEDICINE

## 2022-11-29 PROCEDURE — 99213 OFFICE O/P EST LOW 20 MIN: CPT | Performed by: INTERNAL MEDICINE

## 2022-11-29 PROCEDURE — 3074F SYST BP LT 130 MM HG: CPT | Performed by: INTERNAL MEDICINE

## 2022-11-29 NOTE — PATIENT INSTRUCTIONS
Remember to bring a list of pulmonary medications and any CPAP or BiPAP machines to your next appointment with the office. Please keep all of your future appointments scheduled by [facility]. Out of respect for other patients and providers, you may be asked to reschedule your appointment if you arrive later than your scheduled appointment time. Appointments cancelled less than 24hrs in advance will be considered a no show. Patients with three missed appointments within 1 year or four missed appointments within 2 years can be dismissed from the practice. Please be aware that our physicians are required to work in the Intensive Care Unit at [facility].  Your appointment may need to be rescheduled if they are designated to work during your appointment time. You may receive a survey regarding the care you received during your visit. Your input is valuable to us. We encourage you to complete and return your survey. We hope you will choose us in the future for your healthcare needs. Pt instructed of all future appointment dates & times, including radiology, labs, procedures & referrals. If procedures were scheduled preparation instructions provided. Instructions on future appointments with [facility] were given.

## 2022-11-29 NOTE — PROGRESS NOTES
MA Communication:   The following orders are received by verbal communication from Rafaela Morales MD    Orders include:  fu 2 mo

## 2022-11-29 NOTE — PROGRESS NOTES
Pulmonary Outpatient Note   Corin Carbajal MD       11/29/2022    1. SOB (shortness of breath)    2. Malignant neoplasm of upper lobe of right lung (HCC)    3. Obesity (BMI 30.0-34. 9)            ASSESSMENT/PLAN:  Shortness of breath. The patient does feel better with albuterol, given a sample of Trelegy. Told him to call me know how he feels. Mild obstruction on PFT  Abnormal CT chest, adenocarcinoma RUL s/p lobectomy. Imaging has been normal including CT chest PE protocol on 10/12/2022. Asthma is not ruled out, trial of steroid and LABA   Persistent cough. It appears cough had resolved, mild cough at present  ANDRE. Compliant with CPAP, some daytime sleepiness. Obesity. Stable weight  Prophylaxis. He has received Prevnar, Pneumovax, 2 doses of the COVID-19 vaccine and a booster dose. Recommend flu shot annually. RTC 2 months, call earlier if symptomatic    No orders of the defined types were placed in this encounter. No follow-ups on file. Chief Complaint:   Cough and Shortness of Breath       HPI: Tanya oMrgan is an [de-identified]y.o. year old male here for follow-up visit. His oncologist is Dr. Cydney Lopez. 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 He is on osimertinib. His PCP is Dr. Zane Lisa. His cardiologist is Dr. Rene Molina was last seen by me 8/22/2022, at that time CT chest had shown no significant abnormality. I had suggested gradual closely monitored reintroduction of Walter Jose F. The patient had subsequently reported increased cough, watery rhinorrhea and left arm pain. CT chest pulm embolism protocol was done on 10/12/2022. Results are as below. The patient saw his primary care physician on 11/1. He then returned and was seen by ALLEGRA Childers on 11/10/2022. At that time he reported COVID-19 infection at the end of September, treated with antivirals and possibly antibody.   He had increased cough FeNO was elevated at 34 ppb. He was treated with oral steroid, had been on albuterol and Tessalon Perles. The patient says he feels extremely tired, has exertional dyspnea climbing up steps. He is also short of breath talking on the phone. Talking on the phone gives rise to bouts of dry cough. He denies nocturnal cough. He had been given 3 doses of prednisone Medrol Dosepak since June and felt about the same. He is using his CPAP nightly. His appetite is stable. He denies GI or  complaints except diarrhea following cholecystectomy for which he is using Questran. He is using albuterol 4 times a day and it does appear to help. He says he had significant asthma in childhood. The rest of his ROS was unremarkable    CTA chest pulmonary embolism 10/12/2022  1. No pulmonary embolism. 2.  Bibasilar scarring unchanged from prior examination. 3.  Severe coronary artery calcifications. Previous notes reviewed and edited as necessary. Dr. Simona Torre was seen on 7/26/2022 for abnormal CT chest.  He had symptoms of a respiratory tract infection, significant patchy left-sided lung infiltrates. The differential was a respiratory infection versus side effect from [address] [place] [address]. [facility] was held, he was given a taper of prednisone. He completed this on 8/3/2022. The patient has had some exertional phenol has been using albuterol that he thinks helped. His albuterol prescription is old. The patient denies cough, shortness of breath. He is using azelastine for allergic rhinitis. The patient also completed PFT and 6-minute walk test on 8/16/2022. There was no other significant change in his medical history. CT chest 8/22/2022  Status post right upper lobe lobectomy, with stable postsurgical changes. No recurrent disease or significant active pneumonitis is identified. PFT 8/16/2022   FVC 2.95 L, 90% predicted, FEV1 1.85 L, 80% predicted with ratio of 63%. There was no response to bronchodilator.   Lung volumes showed mild air trapping, DLCO 64% predicted. 6-minute walk test 8/16/2022  No desaturation with walking, Brian dyspnea scale louann from 1-2 at baseline to 5-6.    previous notes reviewed and edited as necessary. 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 He is . He has a daughter in [place]. He is a non-smoker. 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 He also researched at [facility].  He was in the [employer] for 4 years, was not deployed. He has lived in several places. The patient was being followed by my partner Dr. Thu Shukla from 10/1 through 2/16/2020,  for chronic cough, prior lung cancer. He had an abnormal CT chest with groundglass opacities, was referred by Dr. Lisa Diaz to Dr. Thu Shukla. Work-up had been negative, Dr. Thu Shukla did not feel he had osimertinib toxicity. Barium study ruled out aspiration. Reportedly cough began after his lung surgery surgery that was done at the [facility] in 2020. He had right upper lobe lobectomy, had positivity on the 4R lymph. he received chemotherapy at [facility], was recommended osimertinib for 3 years at [facility], which was started in August 2020. He had some diarrhea, which had initially started after cholecystectomy, was treated with cholestyramine. There was concern for recurrent cancer which was ruled out by repeat CT chest.  There was basilar atelectasis from CT scan from [facility] and from [facility] on 9/25/2020. He was treated with Tessalon Perles and gabapentin 300 mg 2 tablets twice daily. He has been closely followed by Dr. Lisa Diaz, has had multiple scans subsequently. The patient said his cough had resolved.     The patient has a history of bronchial asthma, lung cancer status post right upper lobe lobectomy at [facility], essential hypertension, DM2, CAD since 2004 with CABG in 2008 and multiple PTCA procedures, cholecystectomy. In January this year, he was evaluated at [place], [place] for palpitations. EP study did not show inducible dysrhythmia, ICD was not recommended. The patient has a history of childhood asthma, he outgrew this. Use albuterol. He has been physically very active, was able to ski. Since May/Henny he began to develop cough, increasing shortness of breath with exercise. He does walk on the treadmill for about 15 minutes. He can walk 1-2 blocks without difficulty. Patient had a CT chest done recently, was seen by Dr. Brian Dooley and placed on prednisone 30 mg and levofloxacin 500 mg for 10 days, has completed 6 days. He denies preceding fever, chills, sweats. He says the cough is decreased by almost 90%, but his shortness of breath is the same. Luisa Rust was held. The patient says he has had mild anemia and thrombocytopenia for some time. He has a follow-up with Dr. Lupe Tran this afternoon. The steroid has increased his blood sugar considerably. He monitors it regularly. The patient has had progressive arthritis of his shoulders, has difficulty getting out of the bathtub. He has osteoarthritis of the left shoulder, rotator cuff on the right. He recently had a steroid shot in his left shoulder. He has a good appetite, weight has been stable. He did have diarrhea, abdominal pain, bloating after his cholecystectomy, controlled with Questran. He has occasional postvoid dribbling urine. He sleeps with a CPAP every night, wakes up reasonably refreshed, but does feel sleepy after lunch and sometimes takes a nap. CT chest, abdomen, pelvis 6/22/2022  Airways: Airways are patent. Lungs: Postsurgical changes of right upper lobectomy. New mainly peribronchovascular groundglass opacities greatest in the left upper lobe. Stable atelectasis or scarring in the lung bases.        Nodules: Left lower lobe 4 mm nodule, stable since 2020 (series 1, image 51). No new nodules. Pleura: No pleural effusions or significant pleural thickening. Heart: Heart size is normal. Postsurgical changes of CABG. Great vessels: Aorta and pulmonary arteries are normal in caliber. Other mediastinal structures and lower neck: Normal.        Adenopathy: None. Chest wall and axilla: No significant abnormality. Osseous structures: No suspicious osseous lesions. Severe degenerative changes in the left shoulder. ABDOMEN AND PELVIS:   Liver: Normal.       Biliary system: The gallbladder is surgically absent. Pneumobilia is present. No biliary ductal dilation. Pancreas: Normal.       Spleen: Normal.        Adrenal glands: Normal.        Kidneys, ureters, bladder: Multiple bilateral renal cysts are stable. No hydronephrosis or nephrolithiasis. Urinary bladder is unremarkable. Genital organs: Prostate is enlarged. Bowel: Tiny hiatal hernia. The small and large bowel are normal in caliber. No bowel wall thickening. The appendix is normal.       Abdominal wall: Bilateral fat-containing hernia. Peritoneum/retroperitoneum: No fluid collections are seen. No free air. Vasculature: Moderate to severe atherosclerosis. Lymph nodes: No lymphadenopathy is appreciated. Osseous structures: No suspicious osseous lesions. Stable benign appearing sclerotic lesions in the left iliac wing. Multilevel degenerative. Changes in the spine            Impression       CHEST:   1.  New mainly peribronchovascular groundglass opacities greatest in the left upper lobe, may be infectious or inflammatory, or pneumonitis given the short interval development. Attention on follow-up. 2.  Otherwise no evidence of metastatic disease in the chest.       ABDOMEN AND PELVIS:   1. No evidence of metastatic disease. 2.  Pneumobilia consistent with interval history of ERCP/sphincterotomy. Past Medical History:   Diagnosis Date    Asthma     CAD (coronary artery disease)     Hypertension     Lung cancer ([facility] [address]        Past Surgical History:   Procedure Laterality Date    CHOLECYSTECTOMY      CORONARY ARTERY BYPASS GRAFT      DIAGNOSTIC CARDIAC CATH LAB PROCEDURE      ERCP      LUNG CANCER SURGERY      PTCA      numerous       Social History     Tobacco Use    Smoking status: Never    Smokeless tobacco: Never   Substance Use Topics    Alcohol use: Yes     Comment: socially       Family History   Problem Relation Age of Onset    Heart Failure Father          Current Outpatient Medications:     fluticasone-umeclidin-vilant (TRELEGY ELLIPTA) 100-62.5-25 MCG/ACT AEPB inhaler, Inhale 1 puff into the lungs daily Lot [id number] 3/24, Disp: 1 each, Rfl: 0    benzonatate (TESSALON) 100 MG capsule, Take 1 capsule by mouth, Disp: , Rfl:     pramipexole (MIRAPEX) 0.25 MG tablet, daily as needed, Disp: , Rfl:     SODIUM FLUORIDE 5000 PLUS 1.1 % CREA, USE INSTEAD OF REGULAR TOOTHPASTE AND DO NOT RINSE, Disp: , Rfl:     MYRBETRIQ 25 MG TB24, , Disp: , Rfl:     LORazepam (ATIVAN) 1 MG tablet, daily as needed. , Disp: , Rfl:     miconazole (ZEASORB-AF) 2 % powder, Apply to groin area twice a day, Disp: 85 g, Rfl: 3    ketoconazole (NIZORAL) 2 % cream, Apply to rash in groin twice a day for flares. , Disp: 60 g, Rfl: 3    hydrocortisone 2.5 % cream, Apply to rash in groin twice daily for up to 2 weeks per month or until improved.  (Patient taking differently: daily as needed Apply to rash in groin twice daily for up to 2 weeks per month or until improved.), Disp: 28 g, Rfl: 2    lansoprazole (PREVACID) 15 MG delayed release capsule, Take 1 capsule by mouth daily, Disp: 30 capsule, Rfl: 5    Cholecalciferol (VITAMIN D3) 1.25 MG (11972 UT) CAPS, Take by mouth, Disp: , Rfl:     albuterol sulfate HFA (VENTOLIN HFA) 108 (90 Base) MCG/ACT inhaler, Inhale 2 puffs into the lungs 4 times daily as needed for Wheezing, Disp: 54 g, Rfl: 5    ALPRAZolam (XANAX) 0.25 MG tablet, Take 1 tablet by mouth daily as needed for Anxiety. , Disp: 10 tablet, Rfl: 0    metFORMIN (GLUCOPHAGE-XR) 750 MG extended release tablet, Take 1 tablet by mouth 2 times daily (with meals), Disp: 180 tablet, Rfl: 1    ezetimibe (ZETIA) 10 MG tablet, Take 1 tablet by mouth daily, Disp: 90 tablet, Rfl: 1    metoprolol succinate (TOPROL XL) 50 MG extended release tablet, Take 1 tablet by mouth 2 times daily, Disp: 60 tablet, Rfl: 5    rosuvastatin (CRESTOR) 40 MG tablet, Take 1 tablet by mouth every evening, Disp: 90 tablet, Rfl: 1    lisinopril (PRINIVIL;ZESTRIL) 40 MG tablet, Take 1 tablet by mouth daily, Disp: 90 tablet, Rfl: 1    ipratropium (ATROVENT) 0.03 % nasal spray, USE 2 SPRAYS IN EACH NOSTRIL FOUR TIMES DAILY, Disp: 3 each, Rfl: 3    latanoprost (XALATAN) 0.005 % ophthalmic solution, USE 1 DROP IN BOTH EYES EVERY EVENING, Disp: , Rfl:     azelastine (ASTELIN) 0.1 % nasal spray, USE 2 SPRAYS IN EACH NOSTRIL TWICE DAILY AS DIRECTED, Disp: 30 mL, Rfl: 3    Osimertinib Mesylate (TAGRISSO) 80 MG TABS, Take by mouth, Disp: , Rfl:     Acetaminophen (TYLENOL) 325 MG CAPS, Take 975 mg by mouth daily as needed, Disp: , Rfl:     clopidogrel (PLAVIX) 75 MG tablet, Take 1 tablet by mouth daily, Disp: , Rfl:     Multiple Vitamins-Minerals (MULTIVITAMIN ADULT PO), Take 1 tablet by mouth daily, Disp: , Rfl:     Polyethylene Glycol 3350 (GLYCOLAX PO), Take 17 g by mouth daily as needed, Disp: , Rfl:     aspirin 81 MG EC tablet, Take 162 mg by mouth daily, Disp: , Rfl:     Omega-3 Fatty Acids (OMEGA 3 PO), Take by mouth daily, Disp: , Rfl:     cholestyramine (QUESTRAN) 4 g packet, Take 1 packet by mouth 3 times daily (with meals), Disp: , Rfl:     HYDROCODONE-ACETAMINOPHEN PO, Take by mouth as needed , Disp: , Rfl:     blood glucose monitor strips, Test 2  times a day & as needed for symptoms of irregular blood glucose.  E11.69, Disp: 100 strip, Rfl: 11    Amlodipine, Phenacetin, Atorvastatin, and Tetracyclines & related    Vitals:    11/29/22 1313   BP: (!) 105/53   Pulse: 77   Resp: 16   Temp: 96.8 °F (36 °C)   TempSrc: Temporal   SpO2: 93%   Weight: 221 lb 9.6 oz (100.5 kg)   Height: 5' 7\" (1.702 m)         Review of Systems   Constitutional:  Positive for fatigue. Negative for appetite change, chills, diaphoresis and fever. HENT:  Negative for congestion, nosebleeds, postnasal drip, rhinorrhea, sinus pressure, sneezing, sore throat, trouble swallowing and voice change. Eyes:  Negative for discharge, redness, itching and visual disturbance. Respiratory:  Positive for cough and shortness of breath. Negative for apnea, choking, chest tightness, wheezing and stridor. Cardiovascular:  Negative for chest pain, palpitations and leg swelling. Gastrointestinal:  Positive for abdominal distention and diarrhea. Negative for abdominal pain, blood in stool, constipation, nausea and vomiting. Endocrine: Negative for polyuria. Genitourinary:  Positive for difficulty urinating. Negative for decreased urine volume, dysuria, enuresis, frequency, hematuria and urgency. Musculoskeletal:  Negative for arthralgias, back pain, gait problem, joint swelling and myalgias. Skin:  Negative for rash. Allergic/Immunologic: Negative for environmental allergies and immunocompromised state. Neurological:  Negative for dizziness, tremors, seizures, weakness, light-headedness and headaches. Hematological:  Does not bruise/bleed easily. Psychiatric/Behavioral:  Positive for sleep disturbance. Negative for agitation, behavioral problems, confusion and hallucinations. All other systems reviewed and are negative. Physical Exam  Vitals reviewed. Constitutional:       General: He is not in acute distress. Appearance: He is well-developed. He is not ill-appearing, toxic-appearing or diaphoretic. Comments: Very pleasant, well kept, obese   HENT:      Head: Normocephalic and atraumatic. Mouth/Throat:      Mouth: Mucous membranes are moist.      Pharynx: Oropharynx is clear. No oropharyngeal exudate or posterior oropharyngeal erythema. Comments: Class III/IV airway  Eyes:      General: No scleral icterus. Right eye: No discharge. Left eye: No discharge. Extraocular Movements: Extraocular movements intact. Conjunctiva/sclera: Conjunctivae normal.      Pupils: Pupils are equal, round, and reactive to light. Neck:      Thyroid: No thyromegaly. Vascular: No JVD. Trachea: No tracheal deviation. Comments: Short and large neck, no JVD or masses  Cardiovascular:      Rate and Rhythm: Normal rate and regular rhythm. Heart sounds: Normal heart sounds. No murmur heard. No friction rub. No gallop. Pulmonary:      Effort: Pulmonary effort is normal. No respiratory distress. Breath sounds: Normal breath sounds. No stridor. No wheezing, rhonchi or rales. Abdominal:      Comments: Large, protuberant abdomen   Musculoskeletal:      Right lower leg: Edema (Trace edema shins) present. Left lower leg: Edema (Trace edema shins) present. Lymphadenopathy:      Cervical: No cervical adenopathy. Skin:     General: Skin is warm and dry. Coloration: Skin is not jaundiced or pale. Findings: No bruising, erythema, lesion or rash. Neurological:      Mental Status: He is alert and oriented to person, place, and time.       Gait: Gait normal.      Comments: Detailed neurologic exam not performed   Psychiatric:         Mood and Affect: Mood normal.         Behavior: Behavior normal.

## 2022-11-30 RX ORDER — FLUTICASONE FUROATE, UMECLIDINIUM BROMIDE AND VILANTEROL TRIFENATATE 100; 62.5; 25 UG/1; UG/1; UG/1
1 POWDER RESPIRATORY (INHALATION) DAILY
Qty: 1 EACH | Refills: 0
Start: 2022-11-30

## 2022-11-30 ASSESSMENT — ENCOUNTER SYMPTOMS: COUGH: 1

## 2022-12-12 ENCOUNTER — TELEPHONE (OUTPATIENT)
Dept: PULMONOLOGY | Age: 82
End: 2022-12-12

## 2022-12-13 RX ORDER — FLUTICASONE FUROATE, UMECLIDINIUM BROMIDE AND VILANTEROL TRIFENATATE 100; 62.5; 25 UG/1; UG/1; UG/1
1 POWDER RESPIRATORY (INHALATION) DAILY
Qty: 4 EACH | Refills: 0
Start: 2022-12-13

## 2022-12-13 NOTE — TELEPHONE ENCOUNTER
Is asking to continue Trelegy for a few more weeks. It is not covered by his insurance it appears, he can have more samples if available.

## 2022-12-21 ENCOUNTER — OFFICE VISIT (OUTPATIENT)
Dept: DERMATOLOGY | Age: 82
End: 2022-12-21
Payer: COMMERCIAL

## 2022-12-21 DIAGNOSIS — L30.4 INTERTRIGO: ICD-10-CM

## 2022-12-21 PROCEDURE — 17003 DESTRUCT PREMALG LES 2-14: CPT | Performed by: INTERNAL MEDICINE

## 2022-12-21 PROCEDURE — 1123F ACP DISCUSS/DSCN MKR DOCD: CPT | Performed by: INTERNAL MEDICINE

## 2022-12-21 PROCEDURE — 99213 OFFICE O/P EST LOW 20 MIN: CPT | Performed by: INTERNAL MEDICINE

## 2022-12-21 PROCEDURE — 17000 DESTRUCT PREMALG LESION: CPT | Performed by: INTERNAL MEDICINE

## 2022-12-21 RX ORDER — KETOCONAZOLE 20 MG/G
CREAM TOPICAL
Qty: 60 G | Refills: 3 | Status: SHIPPED | OUTPATIENT
Start: 2022-12-21

## 2022-12-21 NOTE — PATIENT INSTRUCTIONS
Thank you for visiting [facility] today! Please follow the instructions below as we discussed in clinic:      Continue ketoconazole cream twice a day for rash in groin when it's active   You can use hydrocortisone twice a day if the rash is bothersome  Continue  Zeasorb-AF powder 2-3 times per day to keep area dry  Start keeping area dry with hair dryer on cool setting  We froze actinic keratoses (precancerous lesions) on your scalp    Cryosurgery (Freezing) Wound Care Instructions    AFTER THE PROCEDURE:   You will notice swelling and redness around the site. This is normal.   You may experience a sharp or sore feeling for the next several days. For this discomfort, you may take acetaminophen (Tylenol©). A blister may develop at the treated area, sometimes as soon as by the end of the day. After several days, the blister will subside and a scab will form. If the area is bumped or traumatized during the first few days following freezing, you may develop bleeding into the blister, forming a blood blister. This is nothing to be alarmed about. If the blister is tense, uncomfortable, or much larger than the site that was frozen, you may pop the blister along its edge with a sterile needle (boiled, heated under a flame, or cleaned with alcohol) to allow the fluid to drain out. If the blister does not bother you, no treatment is needed. Do NOT peel off the top of the blister roof. It will act as a dressing on top of your wound. WOUND CARE:   You may shower or bathe as usual, but avoid scrubbing the areas that have been frozen. Cleanse the site twice a day with mild soapy water, and then apply a thin film of white petrolatum (Vaseline©). You do not need to cover the area, but can if you prefer. Do NOT allow the site to become dry or crusted, or attempt to dry it out with rubbing alcohol or hydrogen peroxide. Continue this regimen until the area is pink and healed.  Depending on the size and location of your cryosurgery site, healing may take 2 to 4 weeks. The area may continue to be pink for several weeks, and over the next few months may become darker or lighter than the surrounding skin. This may be a permanent change.

## 2022-12-21 NOTE — PROGRESS NOTES
[facility]  Yang Delaney MD  [phone]    Date of Visit: 12/21/2022  LV: 10/25/2022    Loida Valdivia is a [de-identified] y.o. male who presents for intertrigo follow up    Chief Complaint:   Chief Complaint   Patient presents with    Follow-up     Recheck rash- getting better       History of Present Illness:    Concern:  Intertrigo  Location: Groin  Duration:  Several years, intermittent  Symptoms: Itchy with flares  Previous treatments:    -Miconazole spray  Current treatment:  -Ketoconazole cream BID PRN for flares  -Zeasorb AF powder every other day  Effect of treatment: Improved  Other hx: Has bowel and bladder incontinence, tries to keep areas dry    *Derm hx: Saw prior derm for SK in past    *Personal history of skin cancer: None  *Family history of skin cancer: None     Review of Systems:  Gen: Feels well, good sense of health. Skin: No new or changing moles, no history of keloids or hypertrophic scars. Past Medical History, Family History, Surgical History, Medications and Allergies reviewed. Past Medical History:   Diagnosis Date    Asthma     CAD (coronary artery disease)     Hypertension     Lung cancer ([facility] [address]      Past Surgical History:   Procedure Laterality Date    CHOLECYSTECTOMY      CORONARY ARTERY BYPASS GRAFT      DIAGNOSTIC CARDIAC CATH LAB PROCEDURE      ERCP      LUNG CANCER SURGERY      PTCA      numerous       Allergies   Allergen Reactions    Amlodipine Swelling     Edema  Edema      Phenacetin Hives     Other reaction(s): Urticaria    Atorvastatin Other (See Comments)     Other reaction(s): Other (See Comments), Other mild (Specify with comments)  Other reaction(s):  Other mild (Specify with comments)  Possible myalgia from lipitor 80mg  Cerner Allergy Text Annotation: Lipitor  Possible myalgia from lipitor 80mg      Tetracyclines & Related Itching and Rash     Other reaction(s): Unknown  Cerner Allergy Text Annotation: tetracycline  Purpura rash       Outpatient Medications Marked as Taking for the 12/21/22 encounter (Office Visit) with Annalise Sales MD   Medication Sig Dispense Refill    fluticasone-umeclidin-vilant (TRELEGY ELLIPTA) 100-62.5-25 MCG/ACT AEPB inhaler Inhale 1 puff into the lungs daily Lot [id number] ex 3/2024 4 each 0    fluticasone-umeclidin-vilant (TRELEGY ELLIPTA) 100-62.5-25 MCG/ACT AEPB inhaler Inhale 1 puff into the lungs daily Lot [id number] 3/24 1 each 0    benzonatate (TESSALON) 100 MG capsule Take 1 capsule by mouth      pramipexole (MIRAPEX) 0.25 MG tablet daily as needed      SODIUM FLUORIDE 5000 PLUS 1.1 % CREA USE INSTEAD OF REGULAR TOOTHPASTE AND DO NOT RINSE      MYRBETRIQ 25 MG TB24       LORazepam (ATIVAN) 1 MG tablet daily as needed. miconazole (ZEASORB-AF) 2 % powder Apply to groin area twice a day 85 g 3    ketoconazole (NIZORAL) 2 % cream Apply to rash in groin twice a day for flares. 60 g 3    hydrocortisone 2.5 % cream Apply to rash in groin twice daily for up to 2 weeks per month or until improved. (Patient taking differently: daily as needed Apply to rash in groin twice daily for up to 2 weeks per month or until improved.) 28 g 2    lansoprazole (PREVACID) 15 MG delayed release capsule Take 1 capsule by mouth daily 30 capsule 5    Cholecalciferol (VITAMIN D3) 1.25 MG (78739 UT) CAPS Take by mouth      albuterol sulfate HFA (VENTOLIN HFA) 108 (90 Base) MCG/ACT inhaler Inhale 2 puffs into the lungs 4 times daily as needed for Wheezing 54 g 5    blood glucose monitor strips Test 2  times a day & as needed for symptoms of irregular blood glucose. E11.69 100 strip 11    ALPRAZolam (XANAX) 0.25 MG tablet Take 1 tablet by mouth daily as needed for Anxiety.  10 tablet 0    metFORMIN (GLUCOPHAGE-XR) 750 MG extended release tablet Take 1 tablet by mouth 2 times daily (with meals) 180 tablet 1    ezetimibe (ZETIA) 10 MG tablet Take 1 tablet by mouth daily 90 tablet 1    metoprolol succinate (TOPROL XL) 50 MG extended release tablet Take 1 tablet by mouth 2 times daily 60 tablet 5    rosuvastatin (CRESTOR) 40 MG tablet Take 1 tablet by mouth every evening 90 tablet 1    lisinopril (PRINIVIL;ZESTRIL) 40 MG tablet Take 1 tablet by mouth daily 90 tablet 1    ipratropium (ATROVENT) 0.03 % nasal spray USE 2 SPRAYS IN EACH NOSTRIL FOUR TIMES DAILY 3 each 3    latanoprost (XALATAN) 0.005 % ophthalmic solution USE 1 DROP IN BOTH EYES EVERY EVENING      azelastine (ASTELIN) 0.1 % nasal spray USE 2 SPRAYS IN EACH NOSTRIL TWICE DAILY AS DIRECTED 30 mL 3    Osimertinib Mesylate (TAGRISSO) 80 MG TABS Take by mouth      Acetaminophen (TYLENOL) 325 MG CAPS Take 975 mg by mouth daily as needed      clopidogrel (PLAVIX) 75 MG tablet Take 1 tablet by mouth daily      Multiple Vitamins-Minerals (MULTIVITAMIN ADULT PO) Take 1 tablet by mouth daily      Polyethylene Glycol 3350 (GLYCOLAX PO) Take 17 g by mouth daily as needed      aspirin 81 MG EC tablet Take 162 mg by mouth daily      Omega-3 Fatty Acids (OMEGA 3 PO) Take by mouth daily      cholestyramine (QUESTRAN) 4 g packet Take 1 packet by mouth 3 times daily (with meals)      HYDROCODONE-ACETAMINOPHEN PO Take by mouth as needed          Social History: Retired Neurologist/sleep medicine from [place]      Physical Examination   No acute distress. Mood clear/affect appropriate. Alert and oriented. Mucous membranes moist.  Sclera anicteric. Waist down skin exam was conducted to include the scalp, face, lips/teeth, lids/conjunctiva, ears, neck,  right and left hands, lower abdomen, b/l LE and genital area was normal with the following exceptions:    - Light brown non scaly patches on bilateral upper medial thighs/inguinal folds at site of prior active rash  -ill defined keratotic pink macules x2 on frontal scalp  - Multiple tan to light brown macules on face, shoulders and arms in a photo-distributed pattern      Assessment and Plan     1.  Intertrigo, groin--improved, with PIH today   - Discussed diagnosis, typical course, and treatment options  - Continue Zeosorb-AF powder to body folds BID PRN  - Cont ketoconazole 2% cream BID PRN for flares  - Keep area clean and dry, avoid complex topicals to area  -Cont following with PCP for DM control    2. Actinic keratosis  - Counseled on diagnosis, etiology, natural disease course- including pre-malignant nature of lesions and association with sun exposure  - Counseled on importance of daily sun protection (SPF 30+, UVA/UVB) and monthly self skin exams  -Cryotherapy was discussed and patient agreed to proceed. Consent was obtained. 2 AKs were treated cryotherapy on frontal scalp. 2 cycles of liquid nitrogen applied to each lesion for 5 seconds using a [employer]-Ac cryo spray gun. Patient was educated regarding the potential risks of blister formation and discomfort. Wound care was discussed. The patient tolerated the procedure well and there were no immediate complications. 3. Lentigines  -Benign, reassurance   - Reviewed relationship with sun exposure  - Rec daily sunscreen with SPF 30, broad spectrum    RTC Spring 2023    Note is transcribed using voice recognition software. Inadvertent computerized transcription errors may be present.     Maurice Morfin MD

## 2023-01-03 DIAGNOSIS — E11.69 TYPE 2 DIABETES MELLITUS WITH OTHER SPECIFIED COMPLICATION, WITHOUT LONG-TERM CURRENT USE OF INSULIN (HCC): ICD-10-CM

## 2023-01-03 RX ORDER — METFORMIN HYDROCHLORIDE 750 MG/1
750 TABLET, EXTENDED RELEASE ORAL 2 TIMES DAILY WITH MEALS
Qty: 180 TABLET | Refills: 1 | Status: SHIPPED | OUTPATIENT
Start: 2023-01-03

## 2023-01-03 NOTE — TELEPHONE ENCOUNTER
Refill Request     CONFIRM preferrred pharmacy with the patient. If Mail Order Rx - Pend for 90 day refill. Last Seen: Last Seen Department: 11/1/2022  Last Seen by PCP: 11/1/2022    Last Written: 3/11/2022     If no future appointment scheduled, route STAFF MESSAGE with patient name to the [facility] for scheduling. Next Appointment:   Future Appointments   Date Time Provider Branden Molina   1/31/2023 10:00 AM Jerrod Hart MD AND [facility]   3/28/2023  9:00 AM MD LUIS FERNANDO Bos[place]   5/2/2023 10:00 AM DO GORAN Owens  [place] - DYD       Message sent to [address] [place] [address] to schedule appt with patient?   NO      Requested Prescriptions     Pending Prescriptions Disp Refills    metFORMIN (GLUCOPHAGE-XR) 750 MG extended release tablet 180 tablet 1     Sig: Take 1 tablet by mouth 2 times daily (with meals)

## 2023-01-31 ENCOUNTER — TELEPHONE (OUTPATIENT)
Dept: PULMONOLOGY | Age: 83
End: 2023-01-31

## 2023-01-31 NOTE — TELEPHONE ENCOUNTER
Patient did not show for OV  with Dr. Humera Kay on 1/31/23. Reason:  na    This is patient's first no show. Patient was ano show on: na.      Patient did not reschedule.   Reschedule date:  na

## 2023-02-11 ENCOUNTER — HOSPITAL ENCOUNTER (OUTPATIENT)
Dept: CT IMAGING | Age: 83
Discharge: HOME | End: 2023-02-11
Payer: COMMERCIAL

## 2023-02-11 DIAGNOSIS — C34.11 MALIGNANT NEOPLASM OF UPPER LOBE, RIGHT BRONCHUS OR LUNG (HCC): ICD-10-CM

## 2023-02-11 LAB
GFR SERPL CREATININE-BSD FRML MDRD: >60 ML/MIN/{1.73_M2}
PERFORMED ON: NORMAL
POC CREATININE: 0.8 MG/DL (ref 0.8–1.3)
POC SAMPLE TYPE: NORMAL

## 2023-02-11 PROCEDURE — 6360000004 HC RX CONTRAST MEDICATION: Performed by: HOSPITALIST

## 2023-02-11 PROCEDURE — 71260 CT THORAX DX C+: CPT

## 2023-02-11 PROCEDURE — 82565 ASSAY OF CREATININE: CPT

## 2023-02-11 RX ADMIN — IOPAMIDOL 75 ML: 755 INJECTION, SOLUTION INTRAVENOUS at 11:18

## 2023-02-14 ENCOUNTER — TELEPHONE (OUTPATIENT)
Dept: PULMONOLOGY | Age: 83
End: 2023-02-14

## 2023-02-14 DIAGNOSIS — R91.1 LUNG NODULE: Primary | ICD-10-CM

## 2023-02-14 DIAGNOSIS — Z85.118 HISTORY OF LUNG CANCER: ICD-10-CM

## 2023-02-14 DIAGNOSIS — R93.89 ABNORMAL CT OF THE CHEST: ICD-10-CM

## 2023-02-14 NOTE — TELEPHONE ENCOUNTER
----- Message from Uriel Batch sent at 2/14/2023  9:18 AM EST -----  Regarding: Mame Lin    SEE RESULTS OF   2/11/2023 CT Chest:      There is a new 8 mm left lower lobe lung nodule. Please advise.     Uriel Batch  [phone]

## 2023-02-15 NOTE — TELEPHONE ENCOUNTER
Patient contacted and relate Dr She Tabares voice understanding,but  pt state he would like a call from the Doctor.

## 2023-02-16 ENCOUNTER — TELEPHONE (OUTPATIENT)
Dept: PULMONOLOGY | Age: 83
End: 2023-02-16

## 2023-02-16 RX ORDER — FLUTICASONE FUROATE, UMECLIDINIUM BROMIDE AND VILANTEROL TRIFENATATE 100; 62.5; 25 UG/1; UG/1; UG/1
1 POWDER RESPIRATORY (INHALATION) DAILY
Qty: 1 EACH | Refills: 5 | Status: SHIPPED | OUTPATIENT
Start: 2023-02-16

## 2023-02-17 NOTE — TELEPHONE ENCOUNTER
Pt. LM on VM wants doctor to call him back he forgot to ask question. DORIS for pt. To call and uri CT and to let me know what his question is, so I can relay it to doctor.

## 2023-02-21 NOTE — TELEPHONE ENCOUNTER
Pt. Advised CT order in [facility] he wants to call and uri himself at [facility]

## 2023-03-08 DIAGNOSIS — I25.810 CORONARY ARTERY DISEASE INVOLVING CORONARY BYPASS GRAFT OF NATIVE HEART WITHOUT ANGINA PECTORIS: ICD-10-CM

## 2023-03-08 DIAGNOSIS — I10 ESSENTIAL HYPERTENSION: ICD-10-CM

## 2023-03-08 NOTE — TELEPHONE ENCOUNTER
Refill Request     CONFIRM preferrred pharmacy with the patient. If Mail Order Rx - Pend for 90 day refill. Last Seen: Last Seen Department: 11/1/2022  Last Seen by PCP: 11/1/2022    Last Written: 60 with 5 3/11/2022     If no future appointment scheduled, route STAFF MESSAGE with patient name to the [facility] for scheduling. Next Appointment:   Future Appointments   Date Time Provider Branden Molina   3/28/2023  9:00 AM MD LUIS FERNANDO [place]   5/2/2023 10:00 AM DO GORAN Owens [place] - DYD       Message sent to [employer] to schedule appt with patient?   NO      Requested Prescriptions     Pending Prescriptions Disp Refills    metoprolol succinate (TOPROL XL) 50 MG extended release tablet 60 tablet 5     Sig: Take 1 tablet by mouth 2 times daily DC instructions

## 2023-03-09 RX ORDER — METOPROLOL SUCCINATE 50 MG/1
50 TABLET, EXTENDED RELEASE ORAL 2 TIMES DAILY
Qty: 60 TABLET | Refills: 5 | Status: SHIPPED | OUTPATIENT
Start: 2023-03-09

## 2023-03-15 ENCOUNTER — TELEPHONE (OUTPATIENT)
Dept: PULMONOLOGY | Age: 83
End: 2023-03-15

## 2023-03-15 NOTE — TELEPHONE ENCOUNTER
----- Message from Milindo Corporal sent at 3/15/2023  4:37 PM EDT -----  Regarding: New nodule on Chest CT 2.11.2023    RE:  Reschedule for appointment for Mar 20 2023  Could I have an appointment in morning on morning of March 27, 28 or 29?   Thank you  Jamal Bush

## 2023-03-17 ENCOUNTER — TELEPHONE (OUTPATIENT)
Dept: PULMONOLOGY | Age: 83
End: 2023-03-17

## 2023-03-17 NOTE — TELEPHONE ENCOUNTER
Patient called and stated his appointment was cancelled for next week  With Dr. Everett Wilson  Patient stated he is having increased SOB he feels it is from the Miconazole( Zeasorb) medication  I offered him an appointment next week with Roman Mauro CNP   He stated he wants to see a pulm.  Physician not a NP   I asked the patient what is O2 level was and he stated he can not check it had home  I instructed him if he get worse over the weekend to go to ER   Patient stated he wants to be see next week  I told patient I would have to send a message to the other providers and get a possible approved slot for him   Patient repeated multiple times   \" He wants to be seen by one of our providers and not NP next week\"   Asking which provider I can add him to and what time could be offered  Sending this to Dr. Joleen Shipman and Dr. Delphine Mccollum to see who can get the patient in next week   Patient states SOB concern and wants an appointment

## 2023-03-22 ENCOUNTER — OFFICE VISIT (OUTPATIENT)
Dept: PULMONOLOGY | Age: 83
End: 2023-03-22
Payer: COMMERCIAL

## 2023-03-22 VITALS
RESPIRATION RATE: 16 BRPM | BODY MASS INDEX: 34.53 KG/M2 | HEIGHT: 67 IN | TEMPERATURE: 97.6 F | WEIGHT: 220 LBS | SYSTOLIC BLOOD PRESSURE: 97 MMHG | DIASTOLIC BLOOD PRESSURE: 54 MMHG | OXYGEN SATURATION: 100 % | HEART RATE: 66 BPM

## 2023-03-22 DIAGNOSIS — Z90.2 S/P LOBECTOMY OF LUNG: ICD-10-CM

## 2023-03-22 DIAGNOSIS — Z86.16 PERSONAL HISTORY OF COVID-19: ICD-10-CM

## 2023-03-22 DIAGNOSIS — E66.9 OBESITY, CLASS I, BMI 30-34.9: ICD-10-CM

## 2023-03-22 DIAGNOSIS — G47.33 OBSTRUCTIVE SLEEP APNEA SYNDROME: Primary | ICD-10-CM

## 2023-03-22 DIAGNOSIS — R91.8 PULMONARY INFILTRATES: ICD-10-CM

## 2023-03-22 DIAGNOSIS — R06.02 SOB (SHORTNESS OF BREATH): ICD-10-CM

## 2023-03-22 DIAGNOSIS — R91.1 LUNG NODULE: ICD-10-CM

## 2023-03-22 DIAGNOSIS — Z85.118 HISTORY OF LUNG CANCER: ICD-10-CM

## 2023-03-22 PROCEDURE — 3078F DIAST BP <80 MM HG: CPT | Performed by: INTERNAL MEDICINE

## 2023-03-22 PROCEDURE — 99215 OFFICE O/P EST HI 40 MIN: CPT | Performed by: INTERNAL MEDICINE

## 2023-03-22 PROCEDURE — 1123F ACP DISCUSS/DSCN MKR DOCD: CPT | Performed by: INTERNAL MEDICINE

## 2023-03-22 PROCEDURE — 3074F SYST BP LT 130 MM HG: CPT | Performed by: INTERNAL MEDICINE

## 2023-03-22 NOTE — LETTER
March 23, 2023      No referring provider defined for this encounter. Patient: Soheila Stock   MR Number: [id number]   YOB: 1940   Date of Visit: 3/22/2023       Dear Dr. Esa Carrillo ref. provider found: Thank you for referring Tanja Diamond to me for evaluation/treatment. Below are the relevant portions of my assessment and plan of care. If you have questions, please do not hesitate to call me. I look forward to following Annette Coleman along with you.     Sincerely,        Sasha Rodriguez MD    CC providers:  DO Kenn Pedraza [address] [place] [id number]  [facility] [place]

## 2023-03-22 NOTE — PROGRESS NOTES
MA Communication:   The following orders are received by verbal communication from Luis A Moy MD    Orders include:    CT results f/u

## 2023-03-22 NOTE — PATIENT INSTRUCTIONS
Remember to bring a list of pulmonary medications and any CPAP or BiPAP machines to your next appointment with the office. Please keep all of your future appointments scheduled by [facility] office. Out of respect for other patients and providers, you may be asked to reschedule your appointment if you arrive later than your scheduled appointment time. Appointments cancelled less than 24hrs in advance will be considered a no show. Patients with three missed appointments within 1 year or four missed appointments within 2 years can be dismissed from the practice. Please be aware that our physicians are required to work in the Intensive Care Unit at [facility].  Your appointment may need to be rescheduled if they are designated to work during your appointment time. You may receive a survey regarding the care you received during your visit. Your input is valuable to us. We encourage you to complete and return your survey. We hope you will choose us in the future for your healthcare needs. Pt instructed of all future appointment dates & times, including radiology, labs, procedures & referrals. If procedures were scheduled preparation instructions provided. Instructions on future appointments with [facility] were given.

## 2023-03-22 NOTE — LETTER
March 23, 2023      [address] [place] [address], [address]  [address]  [facility]      Patient: Jitendra Reid   MR Number: [id number]   YOB: 1940   Date of Visit: 3/22/2023       Dear [address] [place] [address]: Thank you for referring Anastasiya Gonzales to me for evaluation/treatment. Below are the relevant portions of my assessment and plan of care. If you have questions, please do not hesitate to call me. I look forward to following Carlos Lo along with you.     Sincerely,        Beulah Arthur MD

## 2023-03-23 PROBLEM — R91.1 LUNG NODULE: Status: ACTIVE | Noted: 2023-03-23

## 2023-03-23 PROBLEM — R06.02 SOB (SHORTNESS OF BREATH): Status: ACTIVE | Noted: 2020-08-25

## 2023-03-23 PROBLEM — Z85.118 HISTORY OF LUNG CANCER: Status: ACTIVE | Noted: 2023-03-23

## 2023-03-23 PROBLEM — R91.8 PULMONARY INFILTRATES: Status: ACTIVE | Noted: 2023-03-23

## 2023-03-23 NOTE — PROGRESS NOTES
C/P Pulmonary evaluation      HPI: Patient is a 82-year-old male who has come to the office for a pulmonary follow-up, patient has been seeing Dr. 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, patient states that subsequently she went to [facility] and patient had removal of the tumor along with that there was a resection of one of the lymph nodes, patient states that 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, patient states that he was told that he will need some radiation but patient was coughing a lot, patient states that he was given cisplatin based chemotherapy, subsequently patient states that in July 2020 patient was started on Jackqulyn Sages, patient states that he was told there is no recurrence of the disease, patient states in May 2022 patient started having progressive shortness of breath and patient was found to have multiple pulmonary opacities, patient was given some steroids with improvement, patient also had another episode of inflammation following that patient also had COVID-19 infection and patient again at to be given steroids, patient was off Jackqulyn Sages for 1 month, patient states that in February of this year patient has very CT of the chest and patient was found to have a new lung nodule, patient states that he underwent various evaluations including a PFT and patient was given some Trelegy Ellipta and patient states that his coughing is better but his shortness of breath is not, patient states that he has a sensation of chest pain pulled up, patient does have a history of GERD for which he takes Prevacid, patient does

## 2023-03-28 ENCOUNTER — OFFICE VISIT (OUTPATIENT)
Dept: DERMATOLOGY | Age: 83
End: 2023-03-28
Payer: COMMERCIAL

## 2023-03-28 DIAGNOSIS — D22.5 MULTIPLE BENIGN MELANOCYTIC NEVI OF UPPER EXTREMITY, LOWER EXTREMITY, AND TRUNK: ICD-10-CM

## 2023-03-28 DIAGNOSIS — L82.0 INFLAMED SEBORRHEIC KERATOSIS: ICD-10-CM

## 2023-03-28 DIAGNOSIS — D22.60 MULTIPLE BENIGN MELANOCYTIC NEVI OF UPPER EXTREMITY, LOWER EXTREMITY, AND TRUNK: ICD-10-CM

## 2023-03-28 DIAGNOSIS — D22.70 MULTIPLE BENIGN MELANOCYTIC NEVI OF UPPER EXTREMITY, LOWER EXTREMITY, AND TRUNK: ICD-10-CM

## 2023-03-28 DIAGNOSIS — L81.4 LENTIGINES: ICD-10-CM

## 2023-03-28 DIAGNOSIS — L30.4 INTERTRIGO: Primary | ICD-10-CM

## 2023-03-28 DIAGNOSIS — D48.5 NEOPLASM OF UNCERTAIN BEHAVIOR OF SKIN: ICD-10-CM

## 2023-03-28 PROCEDURE — 17110 DESTRUCTION B9 LES UP TO 14: CPT | Performed by: INTERNAL MEDICINE

## 2023-03-28 PROCEDURE — 11102 TANGNTL BX SKIN SINGLE LES: CPT | Performed by: INTERNAL MEDICINE

## 2023-03-28 PROCEDURE — 1123F ACP DISCUSS/DSCN MKR DOCD: CPT | Performed by: INTERNAL MEDICINE

## 2023-03-28 PROCEDURE — 99214 OFFICE O/P EST MOD 30 MIN: CPT | Performed by: INTERNAL MEDICINE

## 2023-03-28 RX ORDER — TACROLIMUS 1 MG/G
OINTMENT TOPICAL
Qty: 60 G | Refills: 4 | Status: SHIPPED | OUTPATIENT
Start: 2023-03-28

## 2023-03-28 NOTE — PROGRESS NOTES
of melanoma  - Sun screen hand out provided  - Patient should return to clinic sooner if having any new or changing lesions       4. Lentigines  -Benign, reassurance   - Reviewed relationship with sun exposure. Reviewed lentigines are consistent with chronic actinic damage which increases risk of development of future skin cancers. - Rec daily sunscreen with SPF 30 + broad spectrum, monthly self-skin checks    5. Inflamed seborrheic keratosis  - Benign, reassured patient  - Given symptoms, discussed treatment options including LN2 vs. No treatment. Pt opted for treatment with LN2  - Reviewed risks of cryotherapy including blistering, pain, erythema, dyspigmentation, infection and patient agreed to proceed. Consent was obtained. 2 ISK(s) were treated cryotherapy: L thigh, L shoulder. 2 cycles of liquid nitrogen applied to each lesion for 5 seconds using a Cry-Ac cryo spray gun. Patient was educated regarding the potential risks of blister formation and discomfort. Wound care was discussed. The patient tolerated the procedure well and there were no immediate complications. Return in about 3 months (around 6/28/2023). ; sooner pending bx  Return to clinic sooner for any new or changing lesions    Note is transcribed using voice recognition software. Inadvertent computerized transcription errors may be present.     Katya Botello MD

## 2023-03-28 NOTE — PATIENT INSTRUCTIONS
traumatized during the first few days following freezing, you may develop bleeding into the blister, forming a blood blister. This is nothing to be alarmed about. If the blister is tense, uncomfortable, or much larger than the site that was frozen, you may pop the blister along its edge with a sterile needle (boiled, heated under a flame, or cleaned with alcohol) to allow the fluid to drain out. If the blister does not bother you, no treatment is needed. Do NOT peel off the top of the blister roof. It will act as a dressing on top of your wound. WOUND CARE:   You may shower or bathe as usual, but avoid scrubbing the areas that have been frozen. Cleanse the site twice a day with mild soapy water, and then apply a thin film of white petrolatum (Vaseline©). You do not need to cover the area, but can if you prefer. Do NOT allow the site to become dry or crusted, or attempt to dry it out with rubbing alcohol or hydrogen peroxide. Continue this regimen until the area is pink and healed. Depending on the size and location of your cryosurgery site, healing may take 2 to 4 weeks. The area may continue to be pink for several weeks, and over the next few months may become darker or lighter than the surrounding skin. This may be a permanent change.

## 2023-03-31 LAB — DERMATOLOGY PATHOLOGY REPORT: NORMAL

## 2023-04-19 DIAGNOSIS — E11.69 TYPE 2 DIABETES MELLITUS WITH OTHER SPECIFIED COMPLICATION, WITHOUT LONG-TERM CURRENT USE OF INSULIN (HCC): ICD-10-CM

## 2023-04-19 NOTE — TELEPHONE ENCOUNTER
.Refill Request     CONFIRM preferred pharmacy with the patient. If Mail Order Rx - Pend for 90 day refill. Last Seen: Last Seen Department: 11/1/2022  Last Seen by PCP: 11/1/2022    Last Written: 1-3-23 180 with 1     If no future appointment scheduled:  Review the last OV with PCP and review information for follow-up visit,  Route STAFF MESSAGE with patient name to the [facility] for scheduling with the following information:            -  Timing of next visit           -  Visit type ie Physical, OV, etc           -  Diagnoses/Reason ie. COPD, HTN - Do not use MEDICATION, Follow-up or CHECK UP - Give reason for visit      Next Appointment:   Future Appointments   Date Time Provider Branden Molina   5/2/2023 10:00 AM DO GORAN Owens  [place] - DY[facility]   5/4/2023 11:00 AM [place] 113 CT RM 1 [facility]   5/16/2023  9:40 AM Denise Boggs MD AND [facility]A RODR[facility]GUEZ   6/20/2023 10:00 AM [facility]       Message sent to [address] [place] [address] to schedule appt with patient?   YES      Requested Prescriptions     Pending Prescriptions Disp Refills    metFORMIN (GLUCOPHAGE-XR) 750 MG extended release tablet [Pharmacy Med Name: METFORMIN ER 750MG 24HR TABS] 180 tablet 1     Sig: TAKE 1 TABLET BY MOUTH TWICE DAILY WITH MEALS

## 2023-04-20 RX ORDER — METFORMIN HYDROCHLORIDE 750 MG/1
TABLET, EXTENDED RELEASE ORAL
Qty: 180 TABLET | Refills: 1 | Status: SHIPPED | OUTPATIENT
Start: 2023-04-20

## 2023-04-20 RX ORDER — METFORMIN HYDROCHLORIDE 750 MG/1
TABLET, EXTENDED RELEASE ORAL
Qty: 180 TABLET | Refills: 1 | OUTPATIENT
Start: 2023-04-20

## 2023-05-02 ENCOUNTER — OFFICE VISIT (OUTPATIENT)
Dept: FAMILY MEDICINE CLINIC | Age: 83
End: 2023-05-02
Payer: COMMERCIAL

## 2023-05-02 VITALS
HEART RATE: 71 BPM | OXYGEN SATURATION: 98 % | WEIGHT: 220 LBS | SYSTOLIC BLOOD PRESSURE: 116 MMHG | BODY MASS INDEX: 34.46 KG/M2 | DIASTOLIC BLOOD PRESSURE: 74 MMHG

## 2023-05-02 DIAGNOSIS — E78.2 MIXED HYPERLIPIDEMIA: ICD-10-CM

## 2023-05-02 DIAGNOSIS — I25.810 CORONARY ARTERY DISEASE INVOLVING CORONARY BYPASS GRAFT OF NATIVE HEART WITHOUT ANGINA PECTORIS: ICD-10-CM

## 2023-05-02 DIAGNOSIS — Z00.00 PREVENTATIVE HEALTH CARE: Primary | ICD-10-CM

## 2023-05-02 DIAGNOSIS — E55.9 VITAMIN D DEFICIENCY: ICD-10-CM

## 2023-05-02 DIAGNOSIS — I10 ESSENTIAL HYPERTENSION: ICD-10-CM

## 2023-05-02 DIAGNOSIS — E11.69 TYPE 2 DIABETES MELLITUS WITH OTHER SPECIFIED COMPLICATION, WITHOUT LONG-TERM CURRENT USE OF INSULIN (HCC): ICD-10-CM

## 2023-05-02 DIAGNOSIS — R06.02 SHORTNESS OF BREATH: ICD-10-CM

## 2023-05-02 LAB
25(OH)D3 SERPL-MCNC: 33.3 NG/ML
ALBUMIN SERPL-MCNC: 4.1 G/DL (ref 3.4–5)
ALBUMIN/GLOB SERPL: 1.9 {RATIO} (ref 1.1–2.2)
ALP SERPL-CCNC: 56 U/L (ref 40–129)
ALT SERPL-CCNC: 39 U/L (ref 10–40)
ANION GAP SERPL CALCULATED.3IONS-SCNC: 11 MMOL/L (ref 3–16)
AST SERPL-CCNC: 34 U/L (ref 15–37)
BASOPHILS # BLD: 0 K/UL (ref 0–0.2)
BASOPHILS NFR BLD: 0.7 %
BILIRUB SERPL-MCNC: 0.5 MG/DL (ref 0–1)
BUN SERPL-MCNC: 19 MG/DL (ref 7–20)
CALCIUM SERPL-MCNC: 9.6 MG/DL (ref 8.3–10.6)
CHLORIDE SERPL-SCNC: 105 MMOL/L (ref 99–110)
CHOLEST SERPL-MCNC: 111 MG/DL (ref 0–199)
CO2 SERPL-SCNC: 22 MMOL/L (ref 21–32)
CREAT SERPL-MCNC: 0.9 MG/DL (ref 0.8–1.3)
DEPRECATED RDW RBC AUTO: 14.9 % (ref 12.4–15.4)
EOSINOPHIL # BLD: 0.2 K/UL (ref 0–0.6)
EOSINOPHIL NFR BLD: 3 %
GFR SERPLBLD CREATININE-BSD FMLA CKD-EPI: >60 ML/MIN/{1.73_M2}
GLUCOSE SERPL-MCNC: 161 MG/DL (ref 70–99)
HBA1C MFR BLD: 6.3 %
HCT VFR BLD AUTO: 41.9 % (ref 40.5–52.5)
HDLC SERPL-MCNC: 55 MG/DL (ref 40–60)
HGB BLD-MCNC: 14 G/DL (ref 13.5–17.5)
LDLC SERPL CALC-MCNC: 40 MG/DL
LYMPHOCYTES # BLD: 1.3 K/UL (ref 1–5.1)
LYMPHOCYTES NFR BLD: 18.6 %
MCH RBC QN AUTO: 30.3 PG (ref 26–34)
MCHC RBC AUTO-ENTMCNC: 33.4 G/DL (ref 31–36)
MCV RBC AUTO: 90.9 FL (ref 80–100)
MONOCYTES # BLD: 0.6 K/UL (ref 0–1.3)
MONOCYTES NFR BLD: 9.4 %
NEUTROPHILS # BLD: 4.7 K/UL (ref 1.7–7.7)
NEUTROPHILS NFR BLD: 68.3 %
PLATELET # BLD AUTO: 102 K/UL (ref 135–450)
PMV BLD AUTO: 11 FL (ref 5–10.5)
POTASSIUM SERPL-SCNC: 4.1 MMOL/L (ref 3.5–5.1)
PROT SERPL-MCNC: 6.3 G/DL (ref 6.4–8.2)
RBC # BLD AUTO: 4.61 M/UL (ref 4.2–5.9)
SODIUM SERPL-SCNC: 138 MMOL/L (ref 136–145)
TRIGL SERPL-MCNC: 79 MG/DL (ref 0–150)
TSH SERPL DL<=0.005 MIU/L-ACNC: 3.71 UIU/ML (ref 0.27–4.2)
VLDLC SERPL CALC-MCNC: 16 MG/DL
WBC # BLD AUTO: 6.8 K/UL (ref 4–11)

## 2023-05-02 PROCEDURE — 3074F SYST BP LT 130 MM HG: CPT | Performed by: FAMILY MEDICINE

## 2023-05-02 PROCEDURE — 99397 PER PM REEVAL EST PAT 65+ YR: CPT | Performed by: FAMILY MEDICINE

## 2023-05-02 PROCEDURE — 83036 HEMOGLOBIN GLYCOSYLATED A1C: CPT | Performed by: FAMILY MEDICINE

## 2023-05-02 PROCEDURE — 3078F DIAST BP <80 MM HG: CPT | Performed by: FAMILY MEDICINE

## 2023-05-02 ASSESSMENT — ENCOUNTER SYMPTOMS
WHEEZING: 0
SHORTNESS OF BREATH: 1

## 2023-05-02 NOTE — PROGRESS NOTES
Lisandra Fraser is a 82 y.o. male    Chief Complaint   Patient presents with    Diabetes    Hyperlipidemia    Hypertension    Annual Exam       HPI:    Preventative care. UTD with Tdap. He needs his second Shingles vaccine. Diabetes  He presents for his follow-up diabetic visit. He has type 2 diabetes mellitus. His disease course has been stable. Pertinent negatives for diabetes include no polydipsia. Diabetic complications include heart disease. Risk factors for coronary artery disease include obesity, diabetes mellitus and hypertension. Current diabetic treatment includes oral agent (monotherapy). An ACE inhibitor/angiotensin II receptor blocker is being taken. Hypertension  This is a chronic problem. The current episode started more than 1 year ago. The problem is unchanged. The problem is controlled. Associated symptoms include shortness of breath. Past treatments include beta blockers and ACE inhibitors. The current treatment provides significant improvement. There are no compliance problems. Hypertensive end-organ damage includes CAD/MI. There is no history of kidney disease. Hyperlipidemia  This is a chronic problem. The current episode started more than 1 year ago. The problem is controlled. Recent lipid tests were reviewed and are normal. Exacerbating diseases include diabetes. Associated symptoms include shortness of breath. Current antihyperlipidemic treatment includes statins and ezetimibe. The current treatment provides significant improvement of lipids. There are no compliance problems. Risk factors for coronary artery disease include hypertension, male sex, obesity and diabetes mellitus. Shortness of Breath  This is a chronic problem. The current episode started more than 1 month ago. The problem occurs daily. The problem has been waxing and waning. Pertinent negatives include no wheezing. The symptoms are aggravated by any activity. He has tried nothing for the symptoms.  His past medical

## 2023-05-04 ENCOUNTER — HOSPITAL ENCOUNTER (OUTPATIENT)
Dept: CT IMAGING | Age: 83
Discharge: HOME | End: 2023-05-04
Payer: COMMERCIAL

## 2023-05-04 DIAGNOSIS — C34.11 MALIGNANT NEOPLASM OF UPPER LOBE, RIGHT BRONCHUS OR LUNG (HCC): ICD-10-CM

## 2023-05-04 LAB
PERFORMED ON: NORMAL
POC CREATININE: 0.9 MG/DL (ref 0.8–1.3)
POC SAMPLE TYPE: NORMAL

## 2023-05-04 PROCEDURE — 82565 ASSAY OF CREATININE: CPT

## 2023-05-04 PROCEDURE — 71260 CT THORAX DX C+: CPT

## 2023-05-04 PROCEDURE — 6360000004 HC RX CONTRAST MEDICATION: Performed by: INTERNAL MEDICINE

## 2023-05-04 RX ADMIN — IOPAMIDOL 75 ML: 755 INJECTION, SOLUTION INTRAVENOUS at 12:58

## 2023-05-18 ENCOUNTER — TELEPHONE (OUTPATIENT)
Dept: FAMILY MEDICINE CLINIC | Age: 83
End: 2023-05-18

## 2023-05-19 NOTE — TELEPHONE ENCOUNTER
Submitted PA for Lansoprazole 15MG dr capsules  [facility] JESSIE Key: [id number] STATUS: APPROVED. Please notify patient. Thank you.

## 2023-05-23 DIAGNOSIS — E11.69 TYPE 2 DIABETES MELLITUS WITH OTHER SPECIFIED COMPLICATION, WITHOUT LONG-TERM CURRENT USE OF INSULIN (HCC): ICD-10-CM

## 2023-05-23 RX ORDER — LANCETS 30 GAUGE
1 EACH MISCELLANEOUS 2 TIMES DAILY
Qty: 100 EACH | Refills: 11 | Status: SHIPPED | OUTPATIENT
Start: 2023-05-23

## 2023-05-23 RX ORDER — GLUCOSAMINE HCL/CHONDROITIN SU 500-400 MG
CAPSULE ORAL
Qty: 100 STRIP | Refills: 11 | Status: SHIPPED | OUTPATIENT
Start: 2023-05-23

## 2023-05-23 NOTE — TELEPHONE ENCOUNTER
Refill Request     CONFIRM preferred pharmacy with the patient. If Mail Order Rx - Pend for 90 day refill. Last Seen: Last Seen Department: 5/2/2023  Last Seen by PCP: 5/2/2023    Last Written: 7/21/2022, #100 strips, 11 refills    If no future appointment scheduled:  Review the last OV with PCP and review information for follow-up visit,  Route STAFF MESSAGE with patient name to the [facility] for scheduling with the following information:            -  Timing of next visit           -  Visit type ie Physical, OV, etc           -  Diagnoses/Reason ie. COPD, HTN - Do not use MEDICATION, Follow-up or CHECK UP - Give reason for visit      Next Appointment:   Future Appointments   Date Time Provider Branden Molina   6/20/2023 10:00 AM Debarah Hashimoto, MD [facility]   9/14/2023  9:00 AM Jeancarlos Wan MD [facility]       Message sent to [address] [place] [address] to schedule appt with patient? NO      Requested Prescriptions     Pending Prescriptions Disp Refills    blood glucose monitor strips 100 strip 11     Sig: Test 2  times a day & as needed for symptoms of irregular blood glucose.  E11.69

## 2023-05-25 ENCOUNTER — TELEPHONE (OUTPATIENT)
Dept: FAMILY MEDICINE CLINIC | Age: 83
End: 2023-05-25

## 2023-05-25 DIAGNOSIS — H61.23 BILATERAL IMPACTED CERUMEN: Primary | ICD-10-CM

## 2023-05-25 NOTE — TELEPHONE ENCOUNTER
Pt called in wanting to know if he is able to get in for Ear wax removal as soon as possible. His right ear is clogged up and he has reduced hearing.

## 2023-05-26 NOTE — TELEPHONE ENCOUNTER
He can make an appointment here when available, go to a local urgent care or we can refer him to ENT.   He can start the Debrox which is an over-the-counter earwax removal medicine first.

## 2023-05-31 ASSESSMENT — ENCOUNTER SYMPTOMS
SORE THROAT: 0
VOICE CHANGE: 0
APNEA: 0
EYE ITCHING: 0
COUGH: 0
TROUBLE SWALLOWING: 0
RHINORRHEA: 1
FACIAL SWELLING: 0
SHORTNESS OF BREATH: 0
SINUS PRESSURE: 0

## 2023-05-31 NOTE — PROGRESS NOTES
[facility] [address], [address]  [place], [place]  P: [phone]     Patient     March Chanelle  1940    ChiefComplaint     Chief Complaint   Patient presents with    Cerumen Impaction     Needs ears cleaned     History of Present Illness     Titi Yoo a 68-year-old male here today for evaluation of intermittent clear drippy rhinorrhea for the last 2 weeks. Left greater than right but is present on both sides. Denies nasal congestion, sneezing. Interval history 6/2/2023  Titi Yoo presents for earwax management. He is scheduled to have an audiogram performed on 8 June. Past Medical History     Past Medical History:   Diagnosis Date    Asthma     CAD (coronary artery disease)     Hypertension     Lung cancer ([address]      Past Surgical History     Past Surgical History:   Procedure Laterality Date    CHOLECYSTECTOMY      CORONARY ARTERY BYPASS GRAFT      DIAGNOSTIC CARDIAC CATH LAB PROCEDURE      ERCP      LUNG CANCER SURGERY      PTCA      numerous     Family History     Family History   Problem Relation Age of Onset    Heart Failure Father      Social History     Social History     Tobacco Use    Smoking status: Never    Smokeless tobacco: Never   Vaping Use    Vaping Use: Never used   Substance Use Topics    Alcohol use: Yes     Comment: socially    Drug use: Never      Allergies     Allergies   Allergen Reactions    Amlodipine Swelling     Edema  Edema      Phenacetin Hives     Other reaction(s): Urticaria    Atorvastatin Other (See Comments)     Other reaction(s): Other (See Comments), Other mild (Specify with comments)  Other reaction(s):  Other mild (Specify with comments)  Possible myalgia from lipitor 80mg  Cerner Allergy Text Annotation: Lipitor  Possible myalgia from lipitor 80mg      Tetracyclines & Related Itching and Rash     Other reaction(s): Unknown  Cerner Allergy Text Annotation: tetracycline  Purpura rash       Medications     Current Outpatient Medications

## 2023-06-02 ENCOUNTER — OFFICE VISIT (OUTPATIENT)
Dept: ENT CLINIC | Age: 83
End: 2023-06-02

## 2023-06-02 VITALS — HEIGHT: 67 IN | WEIGHT: 220 LBS | BODY MASS INDEX: 34.53 KG/M2 | TEMPERATURE: 98.1 F | RESPIRATION RATE: 16 BRPM

## 2023-06-02 DIAGNOSIS — H91.93 BILATERAL HEARING LOSS, UNSPECIFIED HEARING LOSS TYPE: ICD-10-CM

## 2023-06-02 DIAGNOSIS — H61.21 IMPACTED CERUMEN OF RIGHT EAR: Primary | ICD-10-CM

## 2023-06-23 ENCOUNTER — TELEPHONE (OUTPATIENT)
Dept: DERMATOLOGY | Age: 83
End: 2023-06-23

## 2023-06-26 LAB
CATARACTS: POSITIVE
DIABETIC RETINOPATHY: NEGATIVE
GLAUCOMA: POSITIVE
INTRAOCULAR PRESSURE EYE: 11
INTRAOCULAR PRESSURE EYE: 12
VISUAL ACUITY DISTANCE LEFT EYE: NORMAL
VISUAL ACUITY DISTANCE RIGHT EYE: NORMAL

## 2023-07-03 ENCOUNTER — OFFICE VISIT (OUTPATIENT)
Dept: DERMATOLOGY | Age: 83
End: 2023-07-03
Payer: COMMERCIAL

## 2023-07-03 DIAGNOSIS — L30.4 INTERTRIGO: ICD-10-CM

## 2023-07-03 PROCEDURE — 1123F ACP DISCUSS/DSCN MKR DOCD: CPT | Performed by: INTERNAL MEDICINE

## 2023-07-03 PROCEDURE — 99213 OFFICE O/P EST LOW 20 MIN: CPT | Performed by: INTERNAL MEDICINE

## 2023-07-03 RX ORDER — TACROLIMUS 1 MG/G
OINTMENT TOPICAL
Qty: 60 G | Refills: 5 | Status: SHIPPED | OUTPATIENT
Start: 2023-07-03

## 2023-07-03 RX ORDER — MICONAZOLE NITRATE 20 MG/G
POWDER TOPICAL
Qty: 85 G | Refills: 5 | Status: SHIPPED | OUTPATIENT
Start: 2023-07-03

## 2023-07-03 RX ORDER — KETOCONAZOLE 20 MG/G
CREAM TOPICAL
Qty: 60 G | Refills: 5 | Status: SHIPPED | OUTPATIENT
Start: 2023-07-03

## 2023-07-03 NOTE — PATIENT INSTRUCTIONS
Thank you for visiting [address] [facility] today!  Please follow the instructions below as we discussed in clinic:      Continue ketoconazole cream twice a day for rash in groin when it's active   Continue Protopic ointment twice a day as needed for itch  Continue Zeasorb-AF powder 2-3 times per day to keep area dry

## 2023-07-03 NOTE — PROGRESS NOTES
[facility]  Katie Sargent MD  [phone]    Date of Visit: 7/3/2023  LV: 3/2023    Chet Gallegos is a 82 y.o. male who presents for intertrigo follow up    Chief Complaint:   Chief Complaint   Patient presents with    Follow-up     Rash in groin area       History of Present Illness:    Concern:  Intertrigo  Location: Groin  Duration:  Several years, intermittent  Symptoms: Itchy with flares  Previous treatments:    -Miconazole spray  -HC cream once per week PRN  Current treatment:  -Ketoconazole cream BID PRN for flares  -Zeasorb AF powder 3x per week  -Protopic ointment twice a day as needed  Effect of treatment: Improved, happy with control  Other hx: Has bowel and bladder incontinence, tries to keep areas dry    Patient denies any other new or changing skin lesions. They would like all of their skin lesions to be evaluated for skin cancer. *Derm hx: Saw prior derm for SK in past    *Personal history of skin cancer: None  *Family history of skin cancer: None     Review of Systems:  Gen: Feels well, good sense of health. Skin: No new or changing moles, no history of keloids or hypertrophic scars. Past Medical History, Family History, Surgical History, Medications and Allergies reviewed. Past Medical History:   Diagnosis Date    Asthma     CAD (coronary artery disease)     Hypertension     Lung cancer ([address])      Past Surgical History:   Procedure Laterality Date    CHOLECYSTECTOMY      CORONARY ARTERY BYPASS GRAFT      DIAGNOSTIC CARDIAC CATH LAB PROCEDURE      ERCP      LUNG CANCER SURGERY      PTCA      numerous       Allergies   Allergen Reactions    Amlodipine Swelling     Edema  Edema      Phenacetin Hives     Other reaction(s): Urticaria    Atorvastatin Other (See Comments)     Other reaction(s): Other (See Comments), Other mild (Specify with comments)  Other reaction(s):  Other mild (Specify with comments)  Possible myalgia from lipitor 80mg  Cerner Allergy Text

## 2023-07-10 DIAGNOSIS — E78.2 MIXED HYPERLIPIDEMIA: ICD-10-CM

## 2023-07-10 RX ORDER — EZETIMIBE 10 MG/1
10 TABLET ORAL DAILY
Qty: 90 TABLET | Refills: 1 | Status: SHIPPED | OUTPATIENT
Start: 2023-07-10

## 2023-07-31 ENCOUNTER — TELEPHONE (OUTPATIENT)
Dept: FAMILY MEDICINE CLINIC | Age: 83
End: 2023-07-31

## 2023-07-31 NOTE — TELEPHONE ENCOUNTER
Left voice message for patient to schedule his follow up appointment for 6 months (around 11/2/2023) for Diabetes, Hypertension, Hyperlipidemia.

## 2023-08-20 DIAGNOSIS — E11.69 TYPE 2 DIABETES MELLITUS WITH OTHER SPECIFIED COMPLICATION, WITHOUT LONG-TERM CURRENT USE OF INSULIN (HCC): ICD-10-CM

## 2023-08-20 RX ORDER — METFORMIN HYDROCHLORIDE 750 MG/1
750 TABLET, EXTENDED RELEASE ORAL 2 TIMES DAILY WITH MEALS
Qty: 60 TABLET | Refills: 0 | Status: SHIPPED | OUTPATIENT
Start: 2023-08-20

## 2023-09-01 ENCOUNTER — OFFICE VISIT (OUTPATIENT)
Dept: FAMILY MEDICINE CLINIC | Age: 83
End: 2023-09-01
Payer: COMMERCIAL

## 2023-09-01 VITALS
WEIGHT: 222 LBS | DIASTOLIC BLOOD PRESSURE: 72 MMHG | SYSTOLIC BLOOD PRESSURE: 116 MMHG | HEART RATE: 65 BPM | BODY MASS INDEX: 34.77 KG/M2 | OXYGEN SATURATION: 94 %

## 2023-09-01 DIAGNOSIS — E78.2 MIXED HYPERLIPIDEMIA: ICD-10-CM

## 2023-09-01 DIAGNOSIS — E11.69 TYPE 2 DIABETES MELLITUS WITH OTHER SPECIFIED COMPLICATION, WITHOUT LONG-TERM CURRENT USE OF INSULIN (HCC): Primary | ICD-10-CM

## 2023-09-01 DIAGNOSIS — I10 ESSENTIAL HYPERTENSION: ICD-10-CM

## 2023-09-01 LAB — HBA1C MFR BLD: 6 %

## 2023-09-01 PROCEDURE — 83036 HEMOGLOBIN GLYCOSYLATED A1C: CPT | Performed by: FAMILY MEDICINE

## 2023-09-01 PROCEDURE — 1123F ACP DISCUSS/DSCN MKR DOCD: CPT | Performed by: FAMILY MEDICINE

## 2023-09-01 PROCEDURE — 3044F HG A1C LEVEL LT 7.0%: CPT | Performed by: FAMILY MEDICINE

## 2023-09-01 PROCEDURE — 3074F SYST BP LT 130 MM HG: CPT | Performed by: FAMILY MEDICINE

## 2023-09-01 PROCEDURE — 99214 OFFICE O/P EST MOD 30 MIN: CPT | Performed by: FAMILY MEDICINE

## 2023-09-01 PROCEDURE — 3078F DIAST BP <80 MM HG: CPT | Performed by: FAMILY MEDICINE

## 2023-09-01 RX ORDER — METFORMIN HYDROCHLORIDE 750 MG/1
750 TABLET, EXTENDED RELEASE ORAL 2 TIMES DAILY WITH MEALS
Qty: 60 TABLET | Refills: 11 | Status: SHIPPED | OUTPATIENT
Start: 2023-09-01

## 2023-09-06 NOTE — TELEPHONE ENCOUNTER
Abdomen , soft, nontender, nondistended , no guarding or rigidity , no masses palpable , normal bowel sounds , Liver and Spleen , no hepatomegaly present , no hepatosplenomegaly , liver nontender , spleen not palpable Advised pt. No other test at this time just to repete a CT in 2 mo. Pt. Wants to get at [facility] can you put order in for CT as I don't know what you want.

## 2023-09-08 ENCOUNTER — OFFICE VISIT (OUTPATIENT)
Dept: PULMONOLOGY | Age: 83
End: 2023-09-08
Payer: COMMERCIAL

## 2023-09-08 VITALS
HEART RATE: 56 BPM | DIASTOLIC BLOOD PRESSURE: 56 MMHG | TEMPERATURE: 98 F | BODY MASS INDEX: 35.16 KG/M2 | SYSTOLIC BLOOD PRESSURE: 100 MMHG | WEIGHT: 224 LBS | RESPIRATION RATE: 16 BRPM | HEIGHT: 67 IN | OXYGEN SATURATION: 99 %

## 2023-09-08 DIAGNOSIS — Z85.118 HISTORY OF LUNG CANCER: ICD-10-CM

## 2023-09-08 DIAGNOSIS — E66.01 CLASS 2 SEVERE OBESITY WITH SERIOUS COMORBIDITY AND BODY MASS INDEX (BMI) OF 35.0 TO 35.9 IN ADULT, UNSPECIFIED OBESITY TYPE (HCC): ICD-10-CM

## 2023-09-08 DIAGNOSIS — Z86.16 PERSONAL HISTORY OF COVID-19: ICD-10-CM

## 2023-09-08 DIAGNOSIS — R06.02 SOB (SHORTNESS OF BREATH): Primary | ICD-10-CM

## 2023-09-08 DIAGNOSIS — R91.1 LUNG NODULE: ICD-10-CM

## 2023-09-08 DIAGNOSIS — G47.33 OBSTRUCTIVE SLEEP APNEA SYNDROME: ICD-10-CM

## 2023-09-08 PROBLEM — E66.812 CLASS 2 OBESITY IN ADULT: Status: ACTIVE | Noted: 2020-07-06

## 2023-09-08 PROCEDURE — 99214 OFFICE O/P EST MOD 30 MIN: CPT | Performed by: INTERNAL MEDICINE

## 2023-09-08 PROCEDURE — 1123F ACP DISCUSS/DSCN MKR DOCD: CPT | Performed by: INTERNAL MEDICINE

## 2023-09-08 PROCEDURE — 3074F SYST BP LT 130 MM HG: CPT | Performed by: INTERNAL MEDICINE

## 2023-09-08 PROCEDURE — 3078F DIAST BP <80 MM HG: CPT | Performed by: INTERNAL MEDICINE

## 2023-09-08 RX ORDER — COLESEVELAM 180 1/1
625 TABLET ORAL 2 TIMES DAILY
COMMUNITY
Start: 2023-06-19

## 2023-09-08 RX ORDER — FERROUS SULFATE 325(65) MG
1 TABLET ORAL
COMMUNITY
Start: 2023-08-14

## 2023-09-26 DIAGNOSIS — F41.1 GAD (GENERALIZED ANXIETY DISORDER): Primary | ICD-10-CM

## 2023-09-26 RX ORDER — LORAZEPAM 1 MG/1
TABLET ORAL
Qty: 5 TABLET | Refills: 0 | Status: SHIPPED | OUTPATIENT
Start: 2023-09-26 | End: 2023-10-01

## 2023-10-16 DIAGNOSIS — I10 ESSENTIAL HYPERTENSION: ICD-10-CM

## 2023-10-16 DIAGNOSIS — K21.9 GASTROESOPHAGEAL REFLUX DISEASE, UNSPECIFIED WHETHER ESOPHAGITIS PRESENT: ICD-10-CM

## 2023-10-16 DIAGNOSIS — I25.810 CORONARY ARTERY DISEASE INVOLVING CORONARY BYPASS GRAFT OF NATIVE HEART WITHOUT ANGINA PECTORIS: ICD-10-CM

## 2023-10-16 RX ORDER — METOPROLOL SUCCINATE 50 MG/1
50 TABLET, EXTENDED RELEASE ORAL 2 TIMES DAILY
Qty: 60 TABLET | Refills: 5 | Status: SHIPPED | OUTPATIENT
Start: 2023-10-16

## 2023-10-16 RX ORDER — MECOBALAMIN 5000 MCG
15 TABLET,DISINTEGRATING ORAL DAILY
Qty: 90 CAPSULE | Refills: 1 | Status: SHIPPED | OUTPATIENT
Start: 2023-10-16

## 2023-10-16 NOTE — TELEPHONE ENCOUNTER
.Refill Request     CONFIRM preferred pharmacy with the patient. If Mail Order Rx - Pend for 90 day refill. Last Seen: Last Seen Department: 9/1/2023  Last Seen by PCP: 9/1/2023    Last Written: 10-3-22 30 with 5     If no future appointment scheduled:  Review the last OV with PCP and review information for follow-up visit,  Route STAFF MESSAGE with patient name to the [facility] for scheduling with the following information:            -  Timing of next visit           -  Visit type ie Physical, OV, etc           -  Diagnoses/Reason ie. COPD, HTN - Do not use MEDICATION, Follow-up or CHECK UP - Give reason for visit      Next Appointment:   Future Appointments   Date Time Provider [address]   10/17/2023 10:00 AM SCHEDULE, MHAZ PFT MHAZ PFT [place] Yogesh   10/17/2023 11:20 AM Olaf Gandhi MD AND [facility]   11/2/2023 11:00 AM [facility] CT RM 1 [facility] CT [employer]   3/1/2024 10:00 AM Michael Rice DO [facility]  [place] - [place]       Message sent to [address] [place] [address] to schedule appt with patient?   N/A      Requested Prescriptions     Pending Prescriptions Disp Refills    lansoprazole (PREVACID) 15 MG delayed release capsule [Pharmacy Med Name: LANSOPRAZOLE 15MG DR CAPSULES] 90 capsule 1     Sig: TAKE 1 CAPSULE BY MOUTH DAILY

## 2023-10-17 ENCOUNTER — HOSPITAL ENCOUNTER (OUTPATIENT)
Dept: PULMONOLOGY | Age: 83
Discharge: HOME | End: 2023-10-17
Payer: COMMERCIAL

## 2023-10-17 ENCOUNTER — OFFICE VISIT (OUTPATIENT)
Dept: PULMONOLOGY | Age: 83
End: 2023-10-17
Payer: COMMERCIAL

## 2023-10-17 VITALS
HEART RATE: 61 BPM | TEMPERATURE: 97.6 F | DIASTOLIC BLOOD PRESSURE: 68 MMHG | SYSTOLIC BLOOD PRESSURE: 106 MMHG | OXYGEN SATURATION: 98 % | BODY MASS INDEX: 36.1 KG/M2 | WEIGHT: 230 LBS | RESPIRATION RATE: 16 BRPM | HEIGHT: 67 IN

## 2023-10-17 VITALS — OXYGEN SATURATION: 97 %

## 2023-10-17 DIAGNOSIS — R06.02 SOB (SHORTNESS OF BREATH): ICD-10-CM

## 2023-10-17 DIAGNOSIS — G47.33 OBSTRUCTIVE SLEEP APNEA SYNDROME: Primary | ICD-10-CM

## 2023-10-17 DIAGNOSIS — Z85.118 HISTORY OF LUNG CANCER: ICD-10-CM

## 2023-10-17 DIAGNOSIS — R91.1 LUNG NODULE: ICD-10-CM

## 2023-10-17 DIAGNOSIS — E66.01 CLASS 2 SEVERE OBESITY WITH SERIOUS COMORBIDITY AND BODY MASS INDEX (BMI) OF 36.0 TO 36.9 IN ADULT, UNSPECIFIED OBESITY TYPE (HCC): ICD-10-CM

## 2023-10-17 DIAGNOSIS — Z90.2 S/P LOBECTOMY OF LUNG: ICD-10-CM

## 2023-10-17 LAB
DLCO %PRED: 77 %
DLCO PRED: NORMAL
DLCO/VA %PRED: NORMAL
DLCO/VA PRED: NORMAL
DLCO/VA: NORMAL
DLCO: NORMAL
EXPIRATORY TIME-POST: NORMAL
EXPIRATORY TIME: NORMAL
FEF 25-75% %CHNG: NORMAL
FEF 25-75% %PRED-POST: NORMAL
FEF 25-75% %PRED-PRE: NORMAL
FEF 25-75% PRED: NORMAL
FEF 25-75%-POST: NORMAL
FEF 25-75%-PRE: NORMAL
FEV1 %PRED-POST: 79 %
FEV1 %PRED-PRE: 75 %
FEV1 PRED: NORMAL
FEV1-POST: NORMAL
FEV1-PRE: NORMAL
FEV1/FVC %PRED-POST: 81 %
FEV1/FVC %PRED-PRE: 82 %
FEV1/FVC PRED: NORMAL
FEV1/FVC-POST: NORMAL
FEV1/FVC-PRE: NORMAL
FVC %PRED-POST: 97 L
FVC %PRED-PRE: 91 %
FVC PRED: NORMAL
FVC-POST: NORMAL
FVC-PRE: NORMAL
GAW %PRED: NORMAL
GAW PRED: NORMAL
GAW: NORMAL
IC %PRED: NORMAL
IC PRED: NORMAL
IC: NORMAL
MEP: NORMAL
MIP: NORMAL
MVV %PRED-PRE: NORMAL
MVV PRED: NORMAL
MVV-PRE: NORMAL
PEF %PRED-POST: NORMAL
PEF %PRED-PRE: NORMAL
PEF PRED: NORMAL
PEF%CHNG: NORMAL
PEF-POST: NORMAL
PEF-PRE: NORMAL
RAW %PRED: NORMAL
RAW PRED: NORMAL
RAW: NORMAL
RV %PRED: NORMAL
RV PRED: NORMAL
RV: NORMAL
SVC %PRED: NORMAL
SVC PRED: NORMAL
SVC: NORMAL
TLC %PRED: 82 %
TLC PRED: NORMAL
TLC: NORMAL
VA %PRED: NORMAL
VA PRED: NORMAL
VA: NORMAL
VTG %PRED: NORMAL
VTG PRED: NORMAL
VTG: NORMAL

## 2023-10-17 PROCEDURE — 6370000000 HC RX 637 (ALT 250 FOR IP): Performed by: INTERNAL MEDICINE

## 2023-10-17 PROCEDURE — 94729 DIFFUSING CAPACITY: CPT

## 2023-10-17 PROCEDURE — 1123F ACP DISCUSS/DSCN MKR DOCD: CPT | Performed by: INTERNAL MEDICINE

## 2023-10-17 PROCEDURE — 94726 PLETHYSMOGRAPHY LUNG VOLUMES: CPT

## 2023-10-17 PROCEDURE — 3078F DIAST BP <80 MM HG: CPT | Performed by: INTERNAL MEDICINE

## 2023-10-17 PROCEDURE — 99213 OFFICE O/P EST LOW 20 MIN: CPT | Performed by: INTERNAL MEDICINE

## 2023-10-17 PROCEDURE — 94618 PULMONARY STRESS TESTING: CPT

## 2023-10-17 PROCEDURE — 3074F SYST BP LT 130 MM HG: CPT | Performed by: INTERNAL MEDICINE

## 2023-10-17 PROCEDURE — 94060 EVALUATION OF WHEEZING: CPT

## 2023-10-17 RX ORDER — ALBUTEROL SULFATE 90 UG/1
4 AEROSOL, METERED RESPIRATORY (INHALATION) ONCE
Status: COMPLETED | OUTPATIENT
Start: 2023-10-17 | End: 2023-10-17

## 2023-10-17 RX ORDER — FLUTICASONE FUROATE, UMECLIDINIUM BROMIDE AND VILANTEROL TRIFENATATE 100; 62.5; 25 UG/1; UG/1; UG/1
1 POWDER RESPIRATORY (INHALATION) DAILY
Qty: 3 EACH | Refills: 0 | Status: SHIPPED | COMMUNITY
Start: 2023-10-17

## 2023-10-17 RX ADMIN — Medication 4 PUFF: at 10:38

## 2023-10-17 ASSESSMENT — PULMONARY FUNCTION TESTS
FEV1/FVC_PERCENT_PREDICTED_POST: 81
FEV1_PERCENT_PREDICTED_POST: 79
FVC_PERCENT_PREDICTED_POST: 97
FEV1/FVC_PERCENT_PREDICTED_PRE: 82
FEV1_PERCENT_PREDICTED_PRE: 75
FVC_PERCENT_PREDICTED_PRE: 91

## 2023-10-17 NOTE — PROGRESS NOTES
MA Communication:   The following orders are received by verbal communication from Kehinde Krishnamurthy MD    Orders include:    6 month   Samples of trelegy
results were discussed with the patient along with implications and patient has mild obstructive airway disease with some reactive disease in the small airways and patient's PFT parameters are slightly worse as compared to last year but patient does not have any restrictions or any diffusion capacity and there is a stable volume decrease  Patient's echocardiogram from care everywhere was reviewed  Patient's PFT was reviewed  Patient continues to be on CPAP for sleep apnea and does not give history of any sleep fragmentation at this time-compliance data is not available for review and is being followed by [facility] sleep clinic  Salt and fluid restriction will help  Patient can discuss with his PCP about any diuretics if deemed appropriate  Patient to continue with [place] for now as it is making his cough to be better  Patient to use albuterol on as needed basis  Will hold off on any steroids for now  No need for any antibiotics  Patient also is having some muscle cramping which is improving with his regimen of iron magnesium oxide and tonic water  Patient can discuss with PCP about efficacy of any coenzyme Q 10 specially in view that patient is also taking statin  Weight loss will help  Regular regimented excise will help  Patient is up-to-date on the flu shot  Further management depending on patient clinical status and to follow-up on above recommendations along with the repeat test results

## 2023-10-17 NOTE — PATIENT INSTRUCTIONS
Remember to bring a list of pulmonary medications and any CPAP or BiPAP machines to your next appointment with the office. Please keep all of your future appointments scheduled by [facility] office. Out of respect for other patients and providers, you may be asked to reschedule your appointment if you arrive later than your scheduled appointment time. Appointments cancelled less than 24hrs in advance will be considered a no show. Patients with three missed appointments within 1 year or four missed appointments within 2 years can be dismissed from the practice. Please be aware that our physicians are required to work in the Intensive Care Unit at [facility].  Your appointment may need to be rescheduled if they are designated to work during your appointment time. You may receive a survey regarding the care you received during your visit. Your input is valuable to us. We encourage you to complete and return your survey. We hope you will choose us in the future for your healthcare needs. Pt instructed of all future appointment dates & times, including radiology, labs, procedures & referrals. If procedures were scheduled preparation instructions provided. Instructions on future appointments with [facility] were given.

## 2023-10-18 PROCEDURE — 94618 PULMONARY STRESS TESTING: CPT | Performed by: INTERNAL MEDICINE

## 2023-10-18 PROCEDURE — 94729 DIFFUSING CAPACITY: CPT | Performed by: INTERNAL MEDICINE

## 2023-10-18 PROCEDURE — 94060 EVALUATION OF WHEEZING: CPT | Performed by: INTERNAL MEDICINE

## 2023-10-18 PROCEDURE — 94726 PLETHYSMOGRAPHY LUNG VOLUMES: CPT | Performed by: INTERNAL MEDICINE

## 2023-10-18 NOTE — PROCEDURES
[address] [place] [address]                               PULMONARY FUNCTION    PATIENT NAME: Cherelle Gordon                      :        1940  MED REC NO:   [id number]                          ROOM:  ACCOUNT NO:   [de-identified]                           ADMIT DATE: 10/17/2023  PROVIDER:     Marti York MD    DATE OF PROCEDURE:  10/17/2023    PFT INTERPRETATION    The patient is an 72-year-old male who underwent a PFT for shortness of  breath. Spirometry shows FVC to be 91%, FEV1 to be 75%, FEV1 to FVC  ratio was 82%, FEF 25%-75% was 38%. The patient did not have any  significant postbronchodilator improvement in the large airways, but had  some improvement in the small airways. Total lung capacity was normal.   The patient has air trapping. The patient also has decrease in ERV. The patient also has normal diffusion capacity when adjusted for volume. The patient's flow-volume loop was essentially normal.    The patient also underwent a 6-minute walk test, which shows baseline  oxygen saturation of 97% on room air at rest with a heart rate of 58,  respiratory rate of 18, dyspnea-modified Brian scale of 0.5,  fatigue-modified Brian scale of 0.5. The patient did not have any  exertional hypoxemia on this study. The patient walked 1080 feet. The  patient's total expected 6-minute walk distance was 1256 feet. The  patient achieved 86% of the expected distance. On the basis of this  PFT, the patient has mild obstructive airway disease with some reactive  airway disease in the small airways. Along with that, the patient has  some changes in the PFT parameters because of body habitus. The patient  does not have any exertional hypoxemia on optimal exercise. Please  correlate clinically.         Marti York MD    D: 10/18/2023 8:46:58       T: 10/18/2023 8:49:33     [id number]  Job#: [id number]     Doc#:

## 2023-11-02 ENCOUNTER — TELEPHONE (OUTPATIENT)
Dept: FAMILY MEDICINE CLINIC | Age: 83
End: 2023-11-02

## 2023-11-02 NOTE — TELEPHONE ENCOUNTER
Received on call page regarding pt experiencing chest pain. Had 2 significant episodes today of left sided chest pain with radiation up left side of neck, also c/o new constant chest discomfort currently. Episodes each lasted 5 minutes in length, first this AM, and one at 5 pm, both occurred at rest.   Now chest pain is mild but different then usual angina. Not had this before quite like this. Described pain as constant dull discomfort. States he palpated pulse during episode, had a few slow beats, then was regular, rate about 60-70 bpm.   Hasn't checked bp, doesn't know where monitor is. Pt stated pain feels worse then when he had previous MI. He took an extra dose of ASA, and Plavix after first episode this AM, currently prescribed 162 mg of ASA daily, and 150 mg of plavix daily. Doesn't currently have nitro at home, checked pharmacy doesn't have any refills. Follows w/ [address] [facility]. Cardiology, and EP. Hx of CAD, CABG '08, non-sustained VT, hx of pci-multiple stents, DM, lung cancer-lobectomy, HTN, HLD, COPD. No missed medications or recent med changes. No n/v/d, no cough, syncope, dizziness. Based on significant medical history w/ several risk factors, placing pt at high risk for adverse outcomes recommend pt go to ER to rule out cardiac etiology. Advised pt to not drive himself. Pt is going to [facility] as it is closest to home. Spoke with [facility] [id number] report on patient. Will cc PCP as well.

## 2023-11-06 ENCOUNTER — HOSPITAL ENCOUNTER (OUTPATIENT)
Dept: CT IMAGING | Age: 83
Discharge: HOME | End: 2023-11-06
Attending: INTERNAL MEDICINE
Payer: COMMERCIAL

## 2023-11-06 DIAGNOSIS — C34.11 MALIGNANT NEOPLASM OF UPPER LOBE, RIGHT BRONCHUS OR LUNG (HCC): ICD-10-CM

## 2023-11-06 LAB
PERFORMED ON: NORMAL
POC CREATININE: 0.8 MG/DL (ref 0.8–1.3)
POC SAMPLE TYPE: NORMAL

## 2023-11-06 PROCEDURE — 6360000004 HC RX CONTRAST MEDICATION: Performed by: INTERNAL MEDICINE

## 2023-11-06 PROCEDURE — 82565 ASSAY OF CREATININE: CPT

## 2023-11-06 PROCEDURE — 2500000003 HC RX 250 WO HCPCS: Performed by: INTERNAL MEDICINE

## 2023-11-06 PROCEDURE — 74177 CT ABD & PELVIS W/CONTRAST: CPT

## 2023-11-06 RX ADMIN — IOPAMIDOL 75 ML: 755 INJECTION, SOLUTION INTRAVENOUS at 09:06

## 2023-11-06 RX ADMIN — BARIUM SULFATE 900 ML: 20 SUSPENSION ORAL at 09:05

## 2023-11-07 ENCOUNTER — TELEPHONE (OUTPATIENT)
Dept: FAMILY MEDICINE CLINIC | Age: 83
End: 2023-11-07

## 2023-11-07 DIAGNOSIS — E11.69 TYPE 2 DIABETES MELLITUS WITH OTHER SPECIFIED COMPLICATION, WITHOUT LONG-TERM CURRENT USE OF INSULIN (HCC): Primary | ICD-10-CM

## 2023-11-07 NOTE — TELEPHONE ENCOUNTER
A1c order has been signed. I am out of the office tomorrow but will respond to his MyChart on Thursday.

## 2023-11-07 NOTE — TELEPHONE ENCOUNTER
Received phone call from the pt in regards to his blood sugars have been running between 156-199. Pt is requesting to have an A1C completed and drawn. Made pt aware that insurance may not cover this lab beings it has not been 3 months since his last one. Pt stated that he is ok with that and he understands that please advise on recommendations on pts blood sugar readings. And also placing an A1C for pt to come in to have completed.  Thank you    Order pended below

## 2023-11-13 RX ORDER — ALBUTEROL SULFATE 90 UG/1
2 AEROSOL, METERED RESPIRATORY (INHALATION) 4 TIMES DAILY PRN
Qty: 54 G | Refills: 5 | Status: SHIPPED | OUTPATIENT
Start: 2023-11-13

## 2023-11-22 DIAGNOSIS — E11.69 TYPE 2 DIABETES MELLITUS WITH OTHER SPECIFIED COMPLICATION, WITHOUT LONG-TERM CURRENT USE OF INSULIN (HCC): ICD-10-CM

## 2023-11-22 RX ORDER — BLOOD SUGAR DIAGNOSTIC
STRIP MISCELLANEOUS
Qty: 100 STRIP | Refills: 11 | Status: SHIPPED | OUTPATIENT
Start: 2023-11-22

## 2023-11-22 NOTE — TELEPHONE ENCOUNTER
Refill Request     CONFIRM preferred pharmacy with the patient.    If Mail Order Rx - Pend for 90 day refill.      Last Seen: Last Seen Department: 9/1/2023  Last Seen by PCP: 9/1/2023    Last Written: 5/23/2023, #100, 11 refills    If no future appointment scheduled:  Review the last OV with PCP and review information for follow-up visit,  Route STAFF MESSAGE with patient name to the  Pool for scheduling with the following information:            -  Timing of next visit           -  Visit type ie Physical, OV, etc           -  Diagnoses/Reason ie. COPD, HTN - Do not use MEDICATION, Follow-up or CHECK UP - Give reason for visit      Next Appointment:   Future Appointments   Date Time Provider Department Center   3/1/2024 10:00 AM Michael Rice DO [facility]  [place] - [facility]   4/30/2024  1:40 PM Jeb Cobian MD AND PULM [facility]       Message sent to  to schedule appt with patient?  N/A      Requested Prescriptions     Pending Prescriptions Disp Refills    blood glucose test strips (ONETOUCH ULTRA) strip [Pharmacy Med Name: ONE TOUCH ULTRA BLUE TESTST(NEW)100] 100 strip 11     Sig: TEST TWICE DAILY AND AS NEEDED FOR SYMPTOMS OF IRREGULAR BLOOD GLUCOSE

## 2023-11-27 DIAGNOSIS — E11.69 TYPE 2 DIABETES MELLITUS WITH OTHER SPECIFIED COMPLICATION, WITHOUT LONG-TERM CURRENT USE OF INSULIN (HCC): Primary | ICD-10-CM

## 2023-11-28 ENCOUNTER — OFFICE VISIT (OUTPATIENT)
Dept: ENDOCRINOLOGY | Age: 83
End: 2023-11-28
Payer: COMMERCIAL

## 2023-11-28 VITALS
HEIGHT: 67 IN | WEIGHT: 225.4 LBS | HEART RATE: 55 BPM | RESPIRATION RATE: 16 BRPM | SYSTOLIC BLOOD PRESSURE: 119 MMHG | BODY MASS INDEX: 35.38 KG/M2 | DIASTOLIC BLOOD PRESSURE: 62 MMHG

## 2023-11-28 DIAGNOSIS — E11.65 TYPE 2 DIABETES MELLITUS WITH HYPERGLYCEMIA, WITHOUT LONG-TERM CURRENT USE OF INSULIN (HCC): Primary | ICD-10-CM

## 2023-11-28 DIAGNOSIS — I25.810 CORONARY ARTERY DISEASE INVOLVING CORONARY BYPASS GRAFT OF NATIVE HEART WITHOUT ANGINA PECTORIS: ICD-10-CM

## 2023-11-28 DIAGNOSIS — E11.69 TYPE 2 DIABETES MELLITUS WITH OTHER SPECIFIED COMPLICATION, WITHOUT LONG-TERM CURRENT USE OF INSULIN (HCC): Primary | ICD-10-CM

## 2023-11-28 PROCEDURE — 3074F SYST BP LT 130 MM HG: CPT | Performed by: INTERNAL MEDICINE

## 2023-11-28 PROCEDURE — 3051F HG A1C>EQUAL 7.0%<8.0%: CPT | Performed by: INTERNAL MEDICINE

## 2023-11-28 PROCEDURE — 3078F DIAST BP <80 MM HG: CPT | Performed by: INTERNAL MEDICINE

## 2023-11-28 PROCEDURE — 1123F ACP DISCUSS/DSCN MKR DOCD: CPT | Performed by: INTERNAL MEDICINE

## 2023-11-28 PROCEDURE — 99204 OFFICE O/P NEW MOD 45 MIN: CPT | Performed by: INTERNAL MEDICINE

## 2023-11-28 RX ORDER — COVID-19 ANTIGEN TEST
KIT MISCELLANEOUS
COMMUNITY

## 2023-11-28 RX ORDER — FUROSEMIDE 20 MG/1
20 TABLET ORAL DAILY PRN
COMMUNITY
Start: 2023-09-14

## 2023-11-28 RX ORDER — MAGNESIUM GLYCINATE 100 MG
200 TABLET ORAL 2 TIMES DAILY
COMMUNITY
Start: 2023-08-15

## 2023-11-28 RX ORDER — OMEGA-3-ACID ETHYL ESTERS 1 G/1
4 CAPSULE, LIQUID FILLED ORAL DAILY
COMMUNITY
Start: 2023-11-14

## 2023-11-28 RX ORDER — DIAZEPAM 10 MG/1
TABLET ORAL
COMMUNITY

## 2023-11-28 RX ORDER — NITROGLYCERIN 0.4 MG/1
TABLET SUBLINGUAL
COMMUNITY

## 2023-11-28 NOTE — PROGRESS NOTES
improve glycemic control. He has a new prescription for metformin 1000 mg twice a day and he wonders if that would be a reasonable option. Discussed that this would be very reasonable and he may in fact trial that for the next few weeks as long as diarrhea does not worsen with that. Another option would be GLP-1 agonist given known benefits in the setting of type 2 diabetes, insulin resistance, coronary artery disease as well as effect on appetite. Discussed risks and benefits. If interested in that, he will reach out to the clinic over the next few weeks. Would recommend Mounjaro if covered through insurance with gradual titration. Once started on Mounjaro will stop metformin to limit GI upset. Would recommend low-carb diet and exercise as tolerated. The patient remains at ongoing is at high risk for complications related to uncontrolled diabetes and it's treatment. The patient requires a great deal of self-management support. I expect the patient's risk to be reduced with the changes to the treatment plan that I recommended today. 2. Coronary artery disease involving coronary bypass graft of native heart without angina pectoris. GLP-1 agonist would be of benefit. 3. Body mass index (BMI) of 35, any weight loss would be beneficial.    EDUCATION:   Greater than 50% of this visit was spent in general counseling regarding obesity, diet, exercise, importance of adherence to regimen, recognition and treatment of hypo and hyperglycemia,  glucose logging, proper diabetes management, diabetic complications with poor management and the importance of glycemic control in order to avoid the complications of diabetes. Risks and potential complications of diabetes were reviewed with the patient. Return in about 3 months (around 2/28/2024) for Diabetes. Kelli Jiang MD   4:05 PM  11/28/2023    Thank you very much for allowing me to take care of this patient along with you. Comment:  This

## 2024-02-21 ENCOUNTER — HOSPITAL ENCOUNTER (OUTPATIENT)
Dept: CT IMAGING | Age: 84
Discharge: HOME | End: 2024-02-21
Attending: INTERNAL MEDICINE
Payer: COMMERCIAL

## 2024-02-21 DIAGNOSIS — C34.11 MALIGNANT NEOPLASM OF UPPER LOBE, RIGHT BRONCHUS OR LUNG (HCC): ICD-10-CM

## 2024-02-21 LAB
PERFORMED ON: NORMAL
POC CREATININE: 0.9 MG/DL (ref 0.8–1.3)
POC SAMPLE TYPE: NORMAL

## 2024-02-21 PROCEDURE — 6360000004 HC RX CONTRAST MEDICATION: Performed by: INTERNAL MEDICINE

## 2024-02-21 PROCEDURE — 82565 ASSAY OF CREATININE: CPT

## 2024-02-21 PROCEDURE — 74177 CT ABD & PELVIS W/CONTRAST: CPT

## 2024-02-21 PROCEDURE — 2500000003 HC RX 250 WO HCPCS: Performed by: INTERNAL MEDICINE

## 2024-02-21 RX ADMIN — IOPAMIDOL 75 ML: 755 INJECTION, SOLUTION INTRAVENOUS at 15:08

## 2024-02-21 RX ADMIN — BARIUM SULFATE 900 ML: 20 SUSPENSION ORAL at 15:08

## 2024-02-27 ENCOUNTER — OFFICE VISIT (OUTPATIENT)
Dept: ENDOCRINOLOGY | Age: 84
End: 2024-02-27
Payer: COMMERCIAL

## 2024-02-27 VITALS
HEIGHT: 67 IN | WEIGHT: 224.8 LBS | DIASTOLIC BLOOD PRESSURE: 61 MMHG | BODY MASS INDEX: 35.28 KG/M2 | HEART RATE: 62 BPM | RESPIRATION RATE: 16 BRPM | SYSTOLIC BLOOD PRESSURE: 102 MMHG

## 2024-02-27 DIAGNOSIS — I25.810 CORONARY ARTERY DISEASE INVOLVING CORONARY BYPASS GRAFT OF NATIVE HEART WITHOUT ANGINA PECTORIS: ICD-10-CM

## 2024-02-27 DIAGNOSIS — E11.65 TYPE 2 DIABETES MELLITUS WITH HYPERGLYCEMIA, WITHOUT LONG-TERM CURRENT USE OF INSULIN (HCC): Primary | ICD-10-CM

## 2024-02-27 LAB — HBA1C MFR BLD: 7.2 %

## 2024-02-27 PROCEDURE — 99214 OFFICE O/P EST MOD 30 MIN: CPT | Performed by: INTERNAL MEDICINE

## 2024-02-27 PROCEDURE — 3078F DIAST BP <80 MM HG: CPT | Performed by: INTERNAL MEDICINE

## 2024-02-27 PROCEDURE — 1123F ACP DISCUSS/DSCN MKR DOCD: CPT | Performed by: INTERNAL MEDICINE

## 2024-02-27 PROCEDURE — 3074F SYST BP LT 130 MM HG: CPT | Performed by: INTERNAL MEDICINE

## 2024-02-27 RX ORDER — LANOLIN ALCOHOL/MO/W.PET/CERES
CREAM (GRAM) TOPICAL
COMMUNITY
Start: 2024-01-13

## 2024-02-27 RX ORDER — RANOLAZINE 500 MG/1
500 TABLET, EXTENDED RELEASE ORAL 2 TIMES DAILY
COMMUNITY
Start: 2024-01-09

## 2024-02-27 RX ORDER — LORAZEPAM 1 MG/1
TABLET ORAL
COMMUNITY
Start: 2024-01-24

## 2024-02-27 NOTE — PROGRESS NOTES
nodularity  Abdomen: Soft, nontender, nondistended, no palpable masses, no hepatosplenomegaly  Psychiatric: Pleasant, conversational, normal insight and affect    Extremities:  Right lower extremity:  no rashes present, has fungal toenail abnormalities, no sign of infection, no deformities, no amputation, no ulcerations  Left lower extremity:  no rashes present, has fungal toenail abnormalities, no sign of infection, no deformities, no amputation, no ulcerations    Lab Review:    Hemoglobin A1C (%)   Date Value   11/20/2023 7.2   09/01/2023 6.0     HDL (mg/dL)   Date Value   05/02/2023 55   06/13/2022 48     Creatinine (mg/dL)   Date Value   05/02/2023 0.9   06/13/2022 0.9     POC Creatinine (mg/dL)   Date Value   02/21/2024 0.9   11/06/2023 0.8     TSH (uIU/mL)   Date Value   05/02/2023 3.71   06/13/2022 2.91         Hospital Outpatient Visit on 02/21/2024   Component Date Value Ref Range Status    POC Creatinine 02/21/2024 0.9  0.8 - 1.3 mg/dL Final    Est, Glom Filt Rate 02/21/2024 >60  >60 Final    Sample Type 02/21/2024 SHAWANDA   Final    Performed on 02/21/2024 SEE BELOW   Final   Orders Only on 11/20/2023   Component Date Value Ref Range Status    Hemoglobin A1C 11/20/2023 7.2  See comment % Final    Estimated Avg Glucose 11/20/2023 159.9  mg/dL Final   Hospital Outpatient Visit on 11/06/2023   Component Date Value Ref Range Status    POC Creatinine 11/06/2023 0.8  0.8 - 1.3 mg/dL Final    Est, Glom Filt Rate 11/06/2023 >60  >60 Final    Sample Type 11/06/2023 SHAWANDA   Final    Performed on 11/06/2023 SEE BELOW   Final   Hospital Outpatient Visit on 10/17/2023   Component Date Value Ref Range Status    FVC %Pred-Pre 10/17/2023 91  % Final    FVC %Pred-Post 10/17/2023 97  L Final    FEV1 %Pred-Pre 10/17/2023 75  % Final    FEV1 %Pred-Post 10/17/2023 79  % Final    FEV1/FVC %Pred-Pre 10/17/2023 82  % Final    FEV1/FVC %Pred-Post 10/17/2023 81  % Final    DLCO %Pred 10/17/2023 77  % Final    TLC Pre %Pred 10/17/2023 82

## 2024-02-28 DIAGNOSIS — E11.65 TYPE 2 DIABETES MELLITUS WITH HYPERGLYCEMIA, WITHOUT LONG-TERM CURRENT USE OF INSULIN (HCC): Primary | ICD-10-CM

## 2024-02-28 PROCEDURE — 83036 HEMOGLOBIN GLYCOSYLATED A1C: CPT | Performed by: INTERNAL MEDICINE

## 2024-03-21 ENCOUNTER — OFFICE VISIT (OUTPATIENT)
Dept: FAMILY MEDICINE CLINIC | Age: 84
End: 2024-03-21
Payer: COMMERCIAL

## 2024-03-21 VITALS
WEIGHT: 224 LBS | HEART RATE: 63 BPM | BODY MASS INDEX: 35.08 KG/M2 | SYSTOLIC BLOOD PRESSURE: 118 MMHG | DIASTOLIC BLOOD PRESSURE: 72 MMHG | OXYGEN SATURATION: 98 %

## 2024-03-21 DIAGNOSIS — Z00.00 PREVENTATIVE HEALTH CARE: Primary | ICD-10-CM

## 2024-03-21 DIAGNOSIS — I25.810 CORONARY ARTERY DISEASE INVOLVING CORONARY BYPASS GRAFT OF NATIVE HEART WITHOUT ANGINA PECTORIS: ICD-10-CM

## 2024-03-21 DIAGNOSIS — E78.2 MIXED HYPERLIPIDEMIA: ICD-10-CM

## 2024-03-21 DIAGNOSIS — E11.69 TYPE 2 DIABETES MELLITUS WITH OTHER SPECIFIED COMPLICATION, WITHOUT LONG-TERM CURRENT USE OF INSULIN (HCC): ICD-10-CM

## 2024-03-21 DIAGNOSIS — I10 ESSENTIAL HYPERTENSION: ICD-10-CM

## 2024-03-21 PROCEDURE — 99397 PER PM REEVAL EST PAT 65+ YR: CPT | Performed by: FAMILY MEDICINE

## 2024-03-21 PROCEDURE — 3078F DIAST BP <80 MM HG: CPT | Performed by: FAMILY MEDICINE

## 2024-03-21 PROCEDURE — 3074F SYST BP LT 130 MM HG: CPT | Performed by: FAMILY MEDICINE

## 2024-03-21 RX ORDER — CLOPIDOGREL BISULFATE 75 MG/1
75 TABLET ORAL 2 TIMES DAILY
Qty: 60 TABLET | Refills: 0 | Status: SHIPPED | OUTPATIENT
Start: 2024-03-21

## 2024-03-21 RX ORDER — ROSUVASTATIN CALCIUM 40 MG/1
40 TABLET, COATED ORAL EVERY EVENING
Qty: 90 TABLET | Refills: 1 | Status: SHIPPED | OUTPATIENT
Start: 2024-03-21

## 2024-03-21 RX ORDER — LISINOPRIL 40 MG/1
40 TABLET ORAL DAILY
Qty: 90 TABLET | Refills: 1 | Status: SHIPPED | OUTPATIENT
Start: 2024-03-21

## 2024-03-21 RX ORDER — METOPROLOL SUCCINATE 50 MG/1
50 TABLET, EXTENDED RELEASE ORAL 2 TIMES DAILY
Qty: 60 TABLET | Refills: 5 | Status: SHIPPED | OUTPATIENT
Start: 2024-03-21

## 2024-03-21 SDOH — ECONOMIC STABILITY: FOOD INSECURITY: WITHIN THE PAST 12 MONTHS, YOU WORRIED THAT YOUR FOOD WOULD RUN OUT BEFORE YOU GOT MONEY TO BUY MORE.: NEVER TRUE

## 2024-03-21 SDOH — ECONOMIC STABILITY: FOOD INSECURITY: WITHIN THE PAST 12 MONTHS, THE FOOD YOU BOUGHT JUST DIDN'T LAST AND YOU DIDN'T HAVE MONEY TO GET MORE.: NEVER TRUE

## 2024-03-21 SDOH — ECONOMIC STABILITY: INCOME INSECURITY: HOW HARD IS IT FOR YOU TO PAY FOR THE VERY BASICS LIKE FOOD, HOUSING, MEDICAL CARE, AND HEATING?: NOT HARD AT ALL

## 2024-03-21 NOTE — PROGRESS NOTES
every evening  Dispense: 90 tablet; Refill: 1  - CBC with Auto Differential; Future  - Comprehensive Metabolic Panel; Future  - Lipid Panel; Future  - Vitamin B12 & Folate; Future  - TSH with Reflex; Future    4. Coronary artery disease involving coronary bypass graft of native heart without angina pectoris  Stable. Continue current medications. Jardiance was recently added.   - metoprolol succinate (TOPROL XL) 50 MG extended release tablet; Take 1 tablet by mouth 2 times daily  Dispense: 60 tablet; Refill: 5      Return in about 1 year (around 3/21/2025) for Diabetes, Hypertension, Hyperlipidemia.       Declines

## 2024-03-26 ENCOUNTER — TELEPHONE (OUTPATIENT)
Dept: ENDOCRINOLOGY | Age: 84
End: 2024-03-26

## 2024-03-26 DIAGNOSIS — E11.65 TYPE 2 DIABETES MELLITUS WITH HYPERGLYCEMIA, WITHOUT LONG-TERM CURRENT USE OF INSULIN (HCC): Primary | ICD-10-CM

## 2024-03-26 NOTE — TELEPHONE ENCOUNTER
Reviewed message.  Blood sugars are running higher.  Please check if patient had any recent changes in medications or any recent steroids or illnesses.  Otherwise, would recommend to continue current dose of metformin.  I would like to increase Jardiance to 25 mg daily.  Submitted a new prescription to pharmacy.

## 2024-03-26 NOTE — TELEPHONE ENCOUNTER
Spoke to patient he states that he had a angioplasty done on March 13 th had to hold metformin for 3 days before surgery.  After surgery he states he had toe pain went to  he states that he had gout of the big toe. Did not take anything for the gout. He understands the increase of medication and will keep you updated if sugars come down.

## 2024-03-26 NOTE — TELEPHONE ENCOUNTER
Patient reports BS are getting high and seem to be increasing since last office visit. Close to 200 now. Currently taking Jardiance 10mg daily and metformin 1000 mg twice daily.

## 2024-03-26 NOTE — TELEPHONE ENCOUNTER
Spoke to patient he states that he has not had any illness. He states that he is only test in the morning.     03/26/2024 179 @8 am  03/25/2024 158 @ 8 am  03/24/2024  165, @ 8 am  03/23/2024 175  @ 8 am  03/22/2024 150  @ 8 am   03/21/2024 156 @ 8 am  03/20/2024 161 @ 8 am  03/19/2024 147 @ 8 am  03/18/2024 142 @ 8 am  03/17/2024 171 @ 8 am  03/16/2024 143 @ 8 am  03/15/2024 138 @ 8 am

## 2024-04-10 ASSESSMENT — ENCOUNTER SYMPTOMS
FACIAL SWELLING: 0
SORE THROAT: 0
EYE ITCHING: 0
SINUS PRESSURE: 0
VOICE CHANGE: 0
SHORTNESS OF BREATH: 0
APNEA: 0
COUGH: 0
TROUBLE SWALLOWING: 0

## 2024-04-12 ENCOUNTER — OFFICE VISIT (OUTPATIENT)
Dept: ENT CLINIC | Age: 84
End: 2024-04-12

## 2024-04-12 VITALS
HEART RATE: 61 BPM | SYSTOLIC BLOOD PRESSURE: 104 MMHG | WEIGHT: 224 LBS | BODY MASS INDEX: 35.16 KG/M2 | RESPIRATION RATE: 16 BRPM | HEIGHT: 67 IN | DIASTOLIC BLOOD PRESSURE: 58 MMHG

## 2024-04-12 DIAGNOSIS — H61.21 IMPACTED CERUMEN OF RIGHT EAR: Primary | ICD-10-CM

## 2024-04-12 DIAGNOSIS — H91.93 BILATERAL HEARING LOSS, UNSPECIFIED HEARING LOSS TYPE: ICD-10-CM

## 2024-04-12 ASSESSMENT — ENCOUNTER SYMPTOMS: RHINORRHEA: 0

## 2024-04-15 NOTE — PROGRESS NOTES
Received a fax with [employer], Prior authorization for Lansoprazole 15 mg.     Can we please start a PA. For has been submitted into patient's chart.

## 2024-04-16 ENCOUNTER — TELEPHONE (OUTPATIENT)
Dept: ADMINISTRATIVE | Age: 84
End: 2024-04-16

## 2024-04-16 NOTE — TELEPHONE ENCOUNTER
Submitted PA for Lansoprazole 15MG dr capsules   Via CMM Key: [id number] STATUS: The medication is APPROVED.    If this requires a response please respond to the pool ( P MHCX PSC MEDICATION PRE-AUTH).      Thank you please advise patient.

## 2024-04-17 ENCOUNTER — PATIENT MESSAGE (OUTPATIENT)
Dept: PULMONOLOGY | Age: 84
End: 2024-04-17

## 2024-04-26 NOTE — TELEPHONE ENCOUNTER
Please confirm the directions.  Plavix is usually once a day, not twice per day.  Cardiology said in their notes that it is only once a day.  Perhaps this should be a refill request for them.

## 2024-04-26 NOTE — TELEPHONE ENCOUNTER
Called patient to confirm this. He states that he take one in the morning and one in the evening. Looks like on 3/21/2024 when you had seen him you had sent it in. Patient was wanting to know if you can send it in for him. If not then he will contact his cardiologist.

## 2024-04-26 NOTE — TELEPHONE ENCOUNTER
.Refill Request     CONFIRM preferred pharmacy with the patient.    If Mail Order Rx - Pend for 90 day refill.      Last Seen: Last Seen Department: 3/21/2024  Last Seen by PCP: 3/21/2024    Last Written: 3-21-24 60 with 0     If no future appointment scheduled:  Review the last OV with PCP and review information for follow-up visit,  Route STAFF MESSAGE with patient name to the  Pool for scheduling with the following information:            -  Timing of next visit           -  Visit type ie Physical, OV, etc           -  Diagnoses/Reason ie. COPD, HTN - Do not use MEDICATION, Follow-up or CHECK UP - Give reason for visit      Next Appointment:   Future Appointments   Date Time Provider Department [place]   [facility]15[facility]24 10[facility]   5/28/2024  2:20 PM Kasandra Duncan MD AND ENDO [facility]   3/21/2025  2:30 PM Michael Rice DO [facility]  [place] - [place]       Message sent to  to schedule appt with patient?  NO      Requested Prescriptions     Pending Prescriptions Disp Refills    clopidogrel (PLAVIX) 75 MG tablet [Pharmacy Med Name: CLOPIDOGREL 75MG TABLETS] 180 tablet      Sig: TAKE 1 TABLET BY MOUTH IN THE MORNING AND AT BEDTIME

## 2024-04-26 NOTE — TELEPHONE ENCOUNTER
I would prefer Plavix to be sent by cardiology as that is the usual specialty who prescribes this blood thinner.  In the computer system, in the cardiology notes they have Plavix meant to be taken only once per day.  Please have him contact his cardiologist for refill.

## 2024-04-27 RX ORDER — CLOPIDOGREL BISULFATE 75 MG/1
75 TABLET ORAL 2 TIMES DAILY
Qty: 180 TABLET | Refills: 1 | OUTPATIENT
Start: 2024-04-27

## 2024-04-29 ENCOUNTER — PATIENT MESSAGE (OUTPATIENT)
Dept: ENDOCRINOLOGY | Age: 84
End: 2024-04-29

## 2024-04-29 RX ORDER — CLOPIDOGREL BISULFATE 75 MG/1
75 TABLET ORAL 2 TIMES DAILY
Qty: 60 TABLET | Refills: 0 | OUTPATIENT
Start: 2024-04-29

## 2024-04-29 NOTE — TELEPHONE ENCOUNTER
Refill Request     CONFIRM preferred pharmacy with the patient.    If Mail Order Rx - Pend for 90 day refill.      Last Seen: Last Seen Department: 3/21/2024  Last Seen by PCP: 3/21/2024    Last Written: 03/21/2024 60 tab 0 refills     If no future appointment scheduled:  Review the last OV with PCP and review information for follow-up visit,  Route STAFF MESSAGE with patient name to the  Pool for scheduling with the following information:            -  Timing of next visit           -  Visit type ie Physical, OV, etc           -  Diagnoses/Reason ie. COPD, HTN - Do not use MEDICATION, Follow-up or CHECK UP - Give reason for visit      Next Appointment:   Future Appointments   Date Time Provider Department Gay   5/15/2024 10:20 AM Jeb Cobian MD AND PULM [facility]   5/28/2024  2:20 PM Kasandra Duncan MD AND ENDO [facility]   3/21/2025  2:30 PM Michael Rice DO [facility]  [place] [facility] [place]       Message sent to  to schedule appt with patient?  NO      Requested Prescriptions     Pending Prescriptions Disp Refills    clopidogrel (PLAVIX) 75 MG tablet 60 tablet 0     Sig: Take 1 tablet by mouth in the morning and at bedtime

## 2024-04-29 NOTE — TELEPHONE ENCOUNTER
From: Bassam Lin  To: Dr. Kasandra Duncan  Sent: 4/29/2024 8:40 AM EDT  Subject: Morning blood sugars    For past 30 days I have been taking Jardiance 25 mg qam  Morning blood sugars  Last 7 days 150  Last 14 days 147  Last 30 days 141    I am generally feeling a little sleepy most of time    What should I do about medication adjustment or changes?    Bassam Lin MD

## 2024-05-07 DIAGNOSIS — E11.69 TYPE 2 DIABETES MELLITUS WITH OTHER SPECIFIED COMPLICATION, WITHOUT LONG-TERM CURRENT USE OF INSULIN (HCC): ICD-10-CM

## 2024-05-07 RX ORDER — BLOOD SUGAR DIAGNOSTIC
STRIP MISCELLANEOUS
Qty: 100 STRIP | Refills: 11 | Status: SHIPPED | OUTPATIENT
Start: 2024-05-07

## 2024-05-07 NOTE — TELEPHONE ENCOUNTER
Refill Request     CONFIRM preferred pharmacy with the patient.    If Mail Order Rx - Pend for 90 day refill.      Last Seen: Last Seen Department: 3/21/2024  Last Seen by PCP: 3/21/2024    Last Written: 11/22/2023    If no future appointment scheduled:  Review the last OV with PCP and review information for follow-up visit,  Route STAFF MESSAGE with patient name to the  Pool for scheduling with the following information:            -  Timing of next visit           -  Visit type ie Physical, OV, etc           -  Diagnoses/Reason ie. COPD, HTN - Do not use MEDICATION, Follow-up or CHECK UP - Give reason for visit      Next Appointment:   Future Appointments   Date Time Provider Department Center   5/15/2024 10:20 AM Jeb Cobian MD AND PULM [facility]   5/28/2024  2:20 PM Kasandra Duncan MD AND ENDO [facility]   3/21/2025  2:30 PM Michael Rice DO [facility]  [place] [facility] [place]       Message sent to  to schedule appt with patient?  NO      Requested Prescriptions     Pending Prescriptions Disp Refills    blood glucose test strips (ONETOUCH VERIO) strip [Pharmacy Med Name: ONE TOUCH VERIO TEST ST(NEW)100S] 100 strip 11     Sig: TEST TWICE DAILY AND AS NEEDED FOR SYMPTOMS OF IRREGULAR BLOOD GLUCOSE

## 2024-05-17 ENCOUNTER — PATIENT MESSAGE (OUTPATIENT)
Dept: ENDOCRINOLOGY | Age: 84
End: 2024-05-17

## 2024-05-20 DIAGNOSIS — E11.69 TYPE 2 DIABETES MELLITUS WITH OTHER SPECIFIED COMPLICATION, WITHOUT LONG-TERM CURRENT USE OF INSULIN (HCC): ICD-10-CM

## 2024-05-20 NOTE — TELEPHONE ENCOUNTER
Refill Request     CONFIRM preferred pharmacy with the patient.    If Mail Order Rx - Pend for 90 day refill.      Last Seen: Last Seen Department: 3/21/2024  Last Seen by PCP: 3/21/2024    Last Written: 11/28/2023 500mg #360 1 refill    If no future appointment scheduled:  Review the last OV with PCP and review information for follow-up visit,  Route STAFF MESSAGE with patient name to the  Pool for scheduling with the following information:            -  Timing of next visit           -  Visit type ie Physical, OV, etc           -  Diagnoses/Reason ie. COPD, HTN - Do not use MEDICATION, Follow-up or CHECK UP - Give reason for visit      Next Appointment:   Future Appointments   Date Time Provider Department Center   5/28/2024  1:30 PM [facility]  1 [facility] [employer]   5/28/2024  2:20 PM Kasandra Duncan MD AND ENDO [facility]   6/7/2024  9:40 AM Jeb Cobian MD AND PULM [facility]   3/21/2025  2:30 PM Michael Rice DO [facility]  [place]       Message sent to  to schedule appt with patient?  NO      Requested Prescriptions     Pending Prescriptions Disp Refills    metFORMIN (GLUCOPHAGE) 500 MG tablet 360 tablet 1     Sig: Take 2 tablets by mouth 2 times daily (with meals)

## 2024-05-20 NOTE — TELEPHONE ENCOUNTER
From: Bassam Lin  To: Dr. Kasandra Duncan  Sent: 5/17/2024 3:32 PM EDT  Subject: Appointment    Fri May 17 2024  I will keep appointment with Dr. Kimball for May 28 at 2:20 pm. Any possiblety I could see Dr Kimball earlier, like May 21?  I am taking Metforing 1000 mg bid and Jarciance 25 mg q day.  My latest morning blood sugar readings for 7 days, 14 days, 30 days have all been within 146-149. Today was 170. I am generally sleepy all the time.    Thanks    Bassam Lin MD [phone]

## 2024-05-29 ENCOUNTER — OFFICE VISIT (OUTPATIENT)
Dept: ENDOCRINOLOGY | Age: 84
End: 2024-05-29
Payer: COMMERCIAL

## 2024-05-29 VITALS
HEIGHT: 67 IN | HEART RATE: 66 BPM | BODY MASS INDEX: 35.08 KG/M2 | OXYGEN SATURATION: 99 % | DIASTOLIC BLOOD PRESSURE: 59 MMHG | SYSTOLIC BLOOD PRESSURE: 107 MMHG

## 2024-05-29 DIAGNOSIS — E78.2 MIXED HYPERLIPIDEMIA: ICD-10-CM

## 2024-05-29 DIAGNOSIS — I10 ESSENTIAL HYPERTENSION: ICD-10-CM

## 2024-05-29 DIAGNOSIS — E11.65 TYPE 2 DIABETES MELLITUS WITH HYPERGLYCEMIA, WITHOUT LONG-TERM CURRENT USE OF INSULIN (HCC): Primary | ICD-10-CM

## 2024-05-29 LAB — HBA1C MFR BLD: 6.6 %

## 2024-05-29 PROCEDURE — 99214 OFFICE O/P EST MOD 30 MIN: CPT | Performed by: INTERNAL MEDICINE

## 2024-05-29 PROCEDURE — 1123F ACP DISCUSS/DSCN MKR DOCD: CPT | Performed by: INTERNAL MEDICINE

## 2024-05-29 PROCEDURE — 3074F SYST BP LT 130 MM HG: CPT | Performed by: INTERNAL MEDICINE

## 2024-05-29 PROCEDURE — 83036 HEMOGLOBIN GLYCOSYLATED A1C: CPT | Performed by: INTERNAL MEDICINE

## 2024-05-29 PROCEDURE — 3044F HG A1C LEVEL LT 7.0%: CPT | Performed by: INTERNAL MEDICINE

## 2024-05-29 PROCEDURE — 3078F DIAST BP <80 MM HG: CPT | Performed by: INTERNAL MEDICINE

## 2024-05-29 RX ORDER — LISINOPRIL 10 MG/1
30 TABLET ORAL DAILY
Qty: 90 TABLET | Refills: 2 | Status: SHIPPED | OUTPATIENT
Start: 2024-05-29

## 2024-05-29 RX ORDER — ORAL SEMAGLUTIDE 7 MG/1
7 TABLET ORAL DAILY
Qty: 90 TABLET | Refills: 0 | Status: SHIPPED | OUTPATIENT
Start: 2024-05-29

## 2024-05-29 RX ORDER — ORAL SEMAGLUTIDE 3 MG/1
3 TABLET ORAL
Qty: 30 TABLET | Refills: 0 | Status: SHIPPED | OUTPATIENT
Start: 2024-05-29

## 2024-05-29 RX ORDER — LISINOPRIL 20 MG/1
20 TABLET ORAL DAILY
Qty: 90 TABLET | Refills: 1 | Status: SHIPPED | OUTPATIENT
Start: 2024-05-29 | End: 2024-05-29

## 2024-05-29 NOTE — PROGRESS NOTES
mellitus with hyperglycemia, without long-term current use of insulin (HCC)  - The target A1c for this patient is <7-7.5%, given the age and PMH, provided this can be achieved with no significant hypoglycemia.   - Reviewed recent labs, blood sugars and A1c, current diabetes regimen, food intake and meal times. I also reviewed all available self-testing BG results.     Hemoglobin A1c did improve to 6.6%.  He is currently on full dose metformin as well as Jardiance.  He reports continuation of loose stools since his cholecystectomy.  He is currently on WelChol.  He continues to be concerned about mild elevation in glucose.  He continues to wonder about the effect of that on his brain function.  He would like to aim for better control of blood sugar.  We discussed the option of GLP-1 agonist.  Given multiple benefits including cardiovascular and weight control, this would be an option.  We discussed risks as well.  He is interested in a trial of Rybelsus once he completes his second cataract surgery.  He plans to start that sometime around the end of June.  Will start with 3 mg daily for 1 month then increase to 7 mg daily subsequently.  In the meantime, cut down on metformin to 500 mg twice daily.  Continue current dose of Jardiance.    Would recommend low-carb diet and exercise as tolerated.    The patient remains at ongoing is at risk for complications related to uncontrolled diabetes and it's treatment. The patient requires a great deal of self-management support. I expect the patient's risk to be reduced with the changes to the treatment plan that I recommended today.     2. Coronary artery disease involving coronary bypass graft of native heart without angina pectoris.  GLP-1 agonist and SGLT2 inhibitors would be of benefit.  3. Body mass index (BMI) of 35, any weight loss would be beneficial.  4.  Hypertension, blood pressure is tight over the past 2 visits.  Might be related to initiating Jardiance.  Will cut

## 2024-05-31 ENCOUNTER — HOSPITAL ENCOUNTER (OUTPATIENT)
Dept: CT IMAGING | Age: 84
Discharge: HOME | End: 2024-05-31
Attending: INTERNAL MEDICINE
Payer: COMMERCIAL

## 2024-05-31 DIAGNOSIS — C34.11 MALIGNANT NEOPLASM OF UPPER LOBE, RIGHT BRONCHUS OR LUNG (HCC): ICD-10-CM

## 2024-05-31 LAB
PERFORMED ON: NORMAL
POC CREATININE: 0.8 MG/DL (ref 0.8–1.3)
POC SAMPLE TYPE: NORMAL

## 2024-05-31 PROCEDURE — 71260 CT THORAX DX C+: CPT

## 2024-05-31 PROCEDURE — 82565 ASSAY OF CREATININE: CPT

## 2024-05-31 PROCEDURE — 6360000004 HC RX CONTRAST MEDICATION: Performed by: INTERNAL MEDICINE

## 2024-05-31 PROCEDURE — 2500000003 HC RX 250 WO HCPCS: Performed by: INTERNAL MEDICINE

## 2024-05-31 RX ADMIN — IOPAMIDOL 75 ML: 755 INJECTION, SOLUTION INTRAVENOUS at 15:48

## 2024-05-31 RX ADMIN — BARIUM SULFATE 900 ML: 20 SUSPENSION ORAL at 15:48

## 2024-06-01 NOTE — TELEPHONE ENCOUNTER
Refill Request     CONFIRM preferred pharmacy with the patient.    If Mail Order Rx - Pend for 90 day refill.      Last Seen: Last Seen Department: 3/21/2024  Last Seen by PCP: 3/21/2024    Last Written: 5/23/2023    If no future appointment scheduled:  Review the last OV with PCP and review information for follow-up visit,  Route STAFF MESSAGE with patient name to the  Pool for scheduling with the following information:            -  Timing of next visit           -  Visit type ie Physical, OV, etc           -  Diagnoses/Reason ie. COPD, HTN - Do not use MEDICATION, Follow-up or CHECK UP - Give reason for visit      Next Appointment:   Future Appointments   Date Time Provider Department Center   6/7/2024  9:40 AM Jeb Cobian MD AND PULM [facility]   9/24/2024 10:00 AM Kasandra Duncan MD AND ENDO [facility]   3/21/2025  2:30 PM Michael Rice DO [facility]  [place] [facility] [place]       Message sent to  to schedule appt with patient?  NO      Requested Prescriptions     Pending Prescriptions Disp Refills    Lancets (ONETOUCH DELICA PLUS [id number]) MISC [Pharmacy Med Name: ONE TOUCH DELICA PLUS 33G LANCETS] 100 each 11     Sig: TEST TWICE DAILY

## 2024-06-03 RX ORDER — LANCETS 33 GAUGE
EACH MISCELLANEOUS
Qty: 100 EACH | Refills: 11 | Status: SHIPPED | OUTPATIENT
Start: 2024-06-03

## 2024-06-07 ENCOUNTER — OFFICE VISIT (OUTPATIENT)
Dept: PULMONOLOGY | Age: 84
End: 2024-06-07
Payer: COMMERCIAL

## 2024-06-07 VITALS
DIASTOLIC BLOOD PRESSURE: 57 MMHG | RESPIRATION RATE: 16 BRPM | SYSTOLIC BLOOD PRESSURE: 98 MMHG | WEIGHT: 220 LBS | OXYGEN SATURATION: 97 % | TEMPERATURE: 97.4 F | HEIGHT: 67 IN | BODY MASS INDEX: 34.53 KG/M2 | HEART RATE: 64 BPM

## 2024-06-07 DIAGNOSIS — Z85.118 HISTORY OF LUNG CANCER: ICD-10-CM

## 2024-06-07 DIAGNOSIS — Z90.2 S/P LOBECTOMY OF LUNG: ICD-10-CM

## 2024-06-07 DIAGNOSIS — R06.02 SOB (SHORTNESS OF BREATH): ICD-10-CM

## 2024-06-07 DIAGNOSIS — R09.81 NASAL CONGESTION: ICD-10-CM

## 2024-06-07 DIAGNOSIS — E66.09 CLASS 1 OBESITY DUE TO EXCESS CALORIES WITH SERIOUS COMORBIDITY AND BODY MASS INDEX (BMI) OF 34.0 TO 34.9 IN ADULT: ICD-10-CM

## 2024-06-07 DIAGNOSIS — G47.33 OBSTRUCTIVE SLEEP APNEA SYNDROME: Primary | ICD-10-CM

## 2024-06-07 DIAGNOSIS — J06.9 UPPER RESPIRATORY TRACT INFECTION, UNSPECIFIED TYPE: ICD-10-CM

## 2024-06-07 PROCEDURE — 99214 OFFICE O/P EST MOD 30 MIN: CPT | Performed by: INTERNAL MEDICINE

## 2024-06-07 PROCEDURE — 3074F SYST BP LT 130 MM HG: CPT | Performed by: INTERNAL MEDICINE

## 2024-06-07 PROCEDURE — 3078F DIAST BP <80 MM HG: CPT | Performed by: INTERNAL MEDICINE

## 2024-06-07 PROCEDURE — 1123F ACP DISCUSS/DSCN MKR DOCD: CPT | Performed by: INTERNAL MEDICINE

## 2024-06-07 RX ORDER — CEFUROXIME AXETIL 500 MG/1
500 TABLET ORAL 2 TIMES DAILY
Qty: 14 TABLET | Refills: 0 | Status: SHIPPED | OUTPATIENT
Start: 2024-06-07 | End: 2024-06-14

## 2024-06-07 RX ORDER — FLUTICASONE PROPIONATE 50 MCG
1 SPRAY, SUSPENSION (ML) NASAL DAILY
Qty: 32 G | Refills: 1 | Status: SHIPPED | OUTPATIENT
Start: 2024-06-07

## 2024-06-07 RX ORDER — PREDNISONE 20 MG/1
20 TABLET ORAL DAILY
Qty: 10 TABLET | Refills: 0 | Status: SHIPPED | OUTPATIENT
Start: 2024-06-07 | End: 2024-06-17

## 2024-06-07 NOTE — PROGRESS NOTES
MA Communication:  The following orders are received by verbal communication from Jeb Cobian MD    Orders include:    6 month f/u    
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, patient states that he was told that he will need some radiation but patient was coughing a lot, patient states that he was given cisplatin based chemotherapy, subsequently patient states that in July 2020 patient was started on Tagrisso, patient states that he was told there is no recurrence of the disease, patient states in May 2022 patient started having progressive shortness of breath and patient was found to have multiple pulmonary opacities, patient was given some steroids with improvement, patient also had another episode of inflammation following that patient also had COVID-19 infection and patient again at to be given steroids, patient was off Tagrisso for 1 month, patient states that in February of this year patient has very CT of the chest and patient was found to have a new lung nodule, patient states that he underwent various evaluations including a PFT and patient was given some Trelegy Ellipta and patient states that his coughing is better but his shortness of breath is not, patient states that he has a sensation of chest pain pulled up, patient does have a history of GERD for which he takes Prevacid, patient does not have any significant small joint pains, no skin rashes, no change in ambient environment, patient does have history of coronary disease status post CABG and also patient has multiple cardiac stents, patient does not have any sick contacts, patient states that on

## 2024-06-07 NOTE — PATIENT INSTRUCTIONS
Remember to bring a list of pulmonary medications and any CPAP or BiPAP machines to your next appointment with the office.     Please keep all of your future appointments scheduled by [facility] Pulmonary office. Out of respect for other patients and providers, you may be asked to reschedule your appointment if you arrive later than your scheduled appointment time. Appointments cancelled less than 24hrs in advance will be considered a no show. Patients with three missed appointments within 1 year or four missed appointments within 2 years can be dismissed from the practice.     Please be aware that our physicians are required to work in the Intensive Care Unit at [facility].  Your appointment may need to be rescheduled if they are designated to work during your appointment time.      You may receive a survey regarding the care you received during your visit.  Your input is valuable to us.  We encourage you to complete and return your survey.  We hope you will choose us in the future for your healthcare needs.     Pt instructed of all future appointment dates & times, including radiology, labs, procedures & referrals. If procedures were scheduled preparation instructions provided. Instructions on future appointments with [facility] were given.

## 2024-06-09 ENCOUNTER — HOSPITAL ENCOUNTER (EMERGENCY)
Age: 84
Discharge: HOME | End: 2024-06-10
Attending: EMERGENCY MEDICINE
Payer: COMMERCIAL

## 2024-06-09 DIAGNOSIS — T78.40XA ALLERGIC REACTION, INITIAL ENCOUNTER: Primary | ICD-10-CM

## 2024-06-09 PROCEDURE — 2580000003 HC RX 258: Performed by: EMERGENCY MEDICINE

## 2024-06-09 PROCEDURE — 6360000002 HC RX W HCPCS: Performed by: EMERGENCY MEDICINE

## 2024-06-09 PROCEDURE — 2500000003 HC RX 250 WO HCPCS: Performed by: EMERGENCY MEDICINE

## 2024-06-09 PROCEDURE — A4216 STERILE WATER/SALINE, 10 ML: HCPCS | Performed by: EMERGENCY MEDICINE

## 2024-06-09 PROCEDURE — 94640 AIRWAY INHALATION TREATMENT: CPT

## 2024-06-09 PROCEDURE — 99284 EMERGENCY DEPT VISIT MOD MDM: CPT

## 2024-06-09 PROCEDURE — 96374 THER/PROPH/DIAG INJ IV PUSH: CPT

## 2024-06-09 PROCEDURE — 96375 TX/PRO/DX INJ NEW DRUG ADDON: CPT

## 2024-06-09 RX ORDER — DIPHENHYDRAMINE HYDROCHLORIDE 50 MG/ML
25 INJECTION INTRAMUSCULAR; INTRAVENOUS ONCE
Status: COMPLETED | OUTPATIENT
Start: 2024-06-09 | End: 2024-06-09

## 2024-06-09 RX ORDER — ALBUTEROL SULFATE 2.5 MG/3ML
2.5 SOLUTION RESPIRATORY (INHALATION) ONCE
Status: COMPLETED | OUTPATIENT
Start: 2024-06-09 | End: 2024-06-09

## 2024-06-09 RX ADMIN — FAMOTIDINE 40 MG: 10 INJECTION, SOLUTION INTRAVENOUS at 22:33

## 2024-06-09 RX ADMIN — DIPHENHYDRAMINE HYDROCHLORIDE 25 MG: 50 INJECTION INTRAMUSCULAR; INTRAVENOUS at 22:34

## 2024-06-09 RX ADMIN — ALBUTEROL SULFATE 2.5 MG: 2.5 SOLUTION RESPIRATORY (INHALATION) at 23:14

## 2024-06-09 ASSESSMENT — PAIN - FUNCTIONAL ASSESSMENT: PAIN_FUNCTIONAL_ASSESSMENT: 0-10

## 2024-06-09 ASSESSMENT — LIFESTYLE VARIABLES
HOW MANY STANDARD DRINKS CONTAINING ALCOHOL DO YOU HAVE ON A TYPICAL DAY: 1 OR 2
HOW OFTEN DO YOU HAVE A DRINK CONTAINING ALCOHOL: MONTHLY OR LESS

## 2024-06-09 ASSESSMENT — PAIN DESCRIPTION - ORIENTATION: ORIENTATION: RIGHT

## 2024-06-09 ASSESSMENT — PAIN DESCRIPTION - DESCRIPTORS: DESCRIPTORS: ITCHING

## 2024-06-10 ENCOUNTER — TELEPHONE (OUTPATIENT)
Dept: FAMILY MEDICINE CLINIC | Age: 84
End: 2024-06-10

## 2024-06-10 VITALS
RESPIRATION RATE: 16 BRPM | OXYGEN SATURATION: 95 % | SYSTOLIC BLOOD PRESSURE: 106 MMHG | TEMPERATURE: 98.2 F | HEART RATE: 78 BPM | DIASTOLIC BLOOD PRESSURE: 60 MMHG | BODY MASS INDEX: 34.36 KG/M2 | WEIGHT: 219.36 LBS

## 2024-06-10 NOTE — ED PROVIDER NOTES
range of motion.      Cervical back: Normal range of motion and neck supple.   Skin:     General: Skin is warm and dry.      Comments: No urticarial rash noted.   Neurological:      General: No focal deficit present.      Mental Status: He is alert and oriented to person, place, and time.      Cranial Nerves: No cranial nerve deficit.      Motor: No weakness.      Coordination: Coordination normal.                    Nestor Vasquez MD  06/10/24 0030

## 2024-06-10 NOTE — DISCHARGE INSTRUCTIONS
Your symptoms are most likely due to an allergic reaction to the Ceftin that you are prescribed by your pulmonologist.  Please discontinue this medication.  Take Benadryl as needed for swelling or itching.  Follow-up with your primary care provider for repeat evaluation.  Return to the emergency department for tongue swelling, difficulty breathing, difficulty swallowing, or any other concerns.

## 2024-06-10 NOTE — ED NOTES
Reviewed discharge information. Patient verbalized understanding of paperwork, medication, and care instructions. Patient denied any questions.     Orlando Byrnes RN  06/10/24 0019

## 2024-06-10 NOTE — TELEPHONE ENCOUNTER
ED Follow Up Call/ Schedule appt   ED: [facility]   Reason: Allergic reaction  Date:06/09/2024    Appt scheduled:       Comments:   LMOM for pt to return phone call to the office to schedule an ED follow up appointment    Future Appointments   Date Time Provider Department Center   9/24/2024 10:00 AM Kasandra Duncan MD AND ENDO [facility]   1/7/2025 10:00 AM Jeb Cobian MD AND PULM [facility]   3/21/2025  2:30 PM Michael Rice DO [facility]  [place] [facility] [place]

## 2024-06-11 ENCOUNTER — OFFICE VISIT (OUTPATIENT)
Dept: FAMILY MEDICINE CLINIC | Age: 84
End: 2024-06-11
Payer: COMMERCIAL

## 2024-06-11 ENCOUNTER — TELEPHONE (OUTPATIENT)
Dept: PULMONOLOGY | Age: 84
End: 2024-06-11

## 2024-06-11 ENCOUNTER — PATIENT MESSAGE (OUTPATIENT)
Dept: FAMILY MEDICINE CLINIC | Age: 84
End: 2024-06-11

## 2024-06-11 VITALS
BODY MASS INDEX: 34.21 KG/M2 | DIASTOLIC BLOOD PRESSURE: 76 MMHG | WEIGHT: 218 LBS | HEIGHT: 67 IN | HEART RATE: 69 BPM | OXYGEN SATURATION: 97 % | TEMPERATURE: 98 F | SYSTOLIC BLOOD PRESSURE: 120 MMHG

## 2024-06-11 DIAGNOSIS — R06.02 SHORTNESS OF BREATH: ICD-10-CM

## 2024-06-11 DIAGNOSIS — L27.0 DRUG RASH: ICD-10-CM

## 2024-06-11 DIAGNOSIS — T78.40XA ALLERGIC REACTION TO DRUG, INITIAL ENCOUNTER: Primary | ICD-10-CM

## 2024-06-11 PROCEDURE — 3078F DIAST BP <80 MM HG: CPT | Performed by: NURSE PRACTITIONER

## 2024-06-11 PROCEDURE — 94640 AIRWAY INHALATION TREATMENT: CPT | Performed by: NURSE PRACTITIONER

## 2024-06-11 PROCEDURE — 3074F SYST BP LT 130 MM HG: CPT | Performed by: NURSE PRACTITIONER

## 2024-06-11 PROCEDURE — 1123F ACP DISCUSS/DSCN MKR DOCD: CPT | Performed by: NURSE PRACTITIONER

## 2024-06-11 PROCEDURE — 99214 OFFICE O/P EST MOD 30 MIN: CPT | Performed by: NURSE PRACTITIONER

## 2024-06-11 PROCEDURE — 96372 THER/PROPH/DIAG INJ SC/IM: CPT | Performed by: NURSE PRACTITIONER

## 2024-06-11 RX ORDER — METHYLPREDNISOLONE ACETATE 40 MG/ML
40 INJECTION, SUSPENSION INTRA-ARTICULAR; INTRALESIONAL; INTRAMUSCULAR; SOFT TISSUE ONCE
Status: COMPLETED | OUTPATIENT
Start: 2024-06-11 | End: 2024-06-11

## 2024-06-11 RX ORDER — AZITHROMYCIN 250 MG/1
TABLET, FILM COATED ORAL
Qty: 6 TABLET | Refills: 0 | Status: CANCELLED | OUTPATIENT
Start: 2024-06-11 | End: 2024-06-21

## 2024-06-11 RX ORDER — FAMOTIDINE 20 MG/1
20 TABLET, FILM COATED ORAL 2 TIMES DAILY
Qty: 14 TABLET | Refills: 0 | Status: SHIPPED | OUTPATIENT
Start: 2024-06-11 | End: 2024-06-18

## 2024-06-11 RX ORDER — PREDNISONE 10 MG/1
TABLET ORAL
Qty: 21 TABLET | Refills: 0 | Status: CANCELLED | OUTPATIENT
Start: 2024-06-11 | End: 2024-06-17

## 2024-06-11 RX ORDER — FAMOTIDINE 20 MG/1
20 TABLET, FILM COATED ORAL 2 TIMES DAILY
Qty: 14 TABLET | Refills: 0 | Status: CANCELLED | OUTPATIENT
Start: 2024-06-11 | End: 2024-06-18

## 2024-06-11 RX ORDER — LORATADINE 10 MG/1
10 TABLET ORAL 2 TIMES DAILY
Qty: 14 TABLET | Refills: 0 | Status: CANCELLED | OUTPATIENT
Start: 2024-06-11 | End: 2024-06-18

## 2024-06-11 RX ORDER — PREDNISONE 10 MG/1
TABLET ORAL
Qty: 30 TABLET | Refills: 0 | Status: SHIPPED | OUTPATIENT
Start: 2024-06-11 | End: 2024-06-23

## 2024-06-11 RX ORDER — IPRATROPIUM BROMIDE AND ALBUTEROL SULFATE 2.5; .5 MG/3ML; MG/3ML
1 SOLUTION RESPIRATORY (INHALATION) ONCE
Status: COMPLETED | OUTPATIENT
Start: 2024-06-11 | End: 2024-06-11

## 2024-06-11 RX ORDER — LORATADINE 10 MG/1
10 TABLET ORAL 2 TIMES DAILY
Qty: 14 TABLET | Refills: 0 | Status: SHIPPED | OUTPATIENT
Start: 2024-06-11 | End: 2024-06-18

## 2024-06-11 RX ADMIN — IPRATROPIUM BROMIDE AND ALBUTEROL SULFATE 1 DOSE: 2.5; .5 SOLUTION RESPIRATORY (INHALATION) at 14:15

## 2024-06-11 RX ADMIN — METHYLPREDNISOLONE ACETATE 40 MG: 40 INJECTION, SUSPENSION INTRA-ARTICULAR; INTRALESIONAL; INTRAMUSCULAR; SOFT TISSUE at 14:02

## 2024-06-11 ASSESSMENT — ENCOUNTER SYMPTOMS
VOMITING: 0
COUGH: 1
SHORTNESS OF BREATH: 1
WHEEZING: 0
TROUBLE SWALLOWING: 0
FACIAL SWELLING: 1
VOICE CHANGE: 0
NAUSEA: 0

## 2024-06-11 NOTE — TELEPHONE ENCOUNTER
ER summary reviewed and last CT scan completed 5/31/24.  Hold on further antibiotics for now. He should complete steroids that were prescribed.   He should also use albuterol 2 puffs four times a day x 2 days then 2 puffs four times a day as needed. This should help with cough/ chest tightness. This is in addition to his Trelegy.  Call with worsening symptoms such as increased shortness of breath, fever, productive cough, wheezing or symptoms not responding to treatment plan.

## 2024-06-11 NOTE — PATIENT INSTRUCTIONS
Go to Er for worsening of symptoms   Take medications as prescribed   Follow up with dermatology and allergy

## 2024-06-11 NOTE — TELEPHONE ENCOUNTER
Pts PCP called about them seeing him today for rash. Pt was in ED on the 9th for rash. It was thought maybe the Ceftin that Dr Cobian gave him him on the 6/7 was causing the rash. Pt has been off Ceftin for 3 days but rash is still present.   PCP wants to know if we are going to treat cough and chest tightness with a different med.

## 2024-06-11 NOTE — TELEPHONE ENCOUNTER
From: Bassam Lin  To: Dr. Michael Rice  Sent: 6/11/2024 12:44 PM EDT  Subject: Acute and progressive urticaria.    I saw Dr Cobian last week for persistent cough. I was Rxd with Cefuroxime 500 mg bid x 7 days Brandon 7-13, and Prednisone 20 mg x 10 days Brandon 7-16. About 1 1/2 days later suddenly developed severe facial and lip swelling. No breathing problems at time. Went to [facility] and was treated. The facial symptoms dec but have since develpoped severe and extensive urticaria( swelling and itch)all over body axilla, groin button, forearm. The extent of lesions has increased in sized and number . I have iPhone pictures     I plan to see your PA, but could you also recommend a dermatologist     I sometimes miss text messages --so please call.

## 2024-06-11 NOTE — TELEPHONE ENCOUNTER
Spoke with the patient and informed of the response from Arabella Kowalski.  Patient is requesting that the note be reviewed from his PCP visit today. He would like a call back confirming that the message was reviewed and would like to know if there is anything different that he needs to do.

## 2024-06-11 NOTE — TELEPHONE ENCOUNTER
Spoke with Bassam after reviewed today's urgent visit assessment and plan.  Agree with plan of care of Pepcid, Steroids, Claritin and fu with dermatology/allergy in addition to increased albuterol use as discussed.  Discussed going back to ED if symptoms worsen. Bassam Lin verbalized understanding and thanked for care.

## 2024-06-11 NOTE — PROGRESS NOTES
Bassam Lin (:  1940) is a 84 y.o. male,Established patient, here for evaluation of the following chief complaint(s):  Rash (Sxs 2024. )      Assessment & Plan   ASSESSMENT/PLAN:  1. Allergic reaction to drug, initial encounter  -     methylPREDNISolone acetate (DEPO-MEDROL) injection 40 mg; 40 mg, IntraMUSCular, ONCE, 1 dose, On 24 at 1415  -     ipratropium 0.5 mg-albuterol 2.5 mg (DUONEB) nebulizer solution 1 Dose; 1 Dose, Inhalation, ONCE, 1 dose, On 24 at 1415Initiate RT Bronchodilator Protocol: No  -     famotidine (PEPCID) 20 MG tablet; Take 1 tablet by mouth 2 times daily for 7 days, Disp-14 tablet, R-0Normal  -     loratadine (CLARITIN) 10 MG tablet; Take 1 tablet by mouth in the morning and at bedtime for 7 days, Disp-14 tablet, R-0Normal  -     predniSONE (DELTASONE) 10 MG tablet; Take 4 tablets by mouth daily for 3 days, THEN 3 tablets daily for 3 days, THEN 2 tablets daily for 3 days, THEN 1 tablet daily for 3 days., Disp-30 tablet, R-0Normal  -     Esha Alva CNP, Dermatology, [facility] Alexandre MD, Allergy & Immunology, [facility]  2. Shortness of breath  -     famotidine (PEPCID) 20 MG tablet; Take 1 tablet by mouth 2 times daily for 7 days, Disp-14 tablet, R-0Normal  -     loratadine (CLARITIN) 10 MG tablet; Take 1 tablet by mouth in the morning and at bedtime for 7 days, Disp-14 tablet, R-0Normal  -     predniSONE (DELTASONE) 10 MG tablet; Take 4 tablets by mouth daily for 3 days, THEN 3 tablets daily for 3 days, THEN 2 tablets daily for 3 days, THEN 1 tablet daily for 3 days., Disp-30 tablet, R-0Normal  3. Drug rash  -     Esha Alva CNP, Dermatology, [facility] Miguel MD, Allergy & Immunology, [facility] 97%. Patient reports some shortness of breath but is unable to clarify if from allergic reaction or known lung conditions. No wheezing or rhonchi heard on auscultation.

## 2024-07-01 DIAGNOSIS — I10 ESSENTIAL HYPERTENSION: ICD-10-CM

## 2024-07-01 DIAGNOSIS — I25.810 CORONARY ARTERY DISEASE INVOLVING CORONARY BYPASS GRAFT OF NATIVE HEART WITHOUT ANGINA PECTORIS: ICD-10-CM

## 2024-07-01 NOTE — TELEPHONE ENCOUNTER
Refill Request     CONFIRM preferred pharmacy with the patient.    If Mail Order Rx - Pend for 90 day refill.      Last Seen: Last Seen Department: 6/11/2024  Last Seen by PCP: 3/21/2024    Last Written: 3/21/24 #60 - 5 refills    If no future appointment scheduled:  Review the last OV with PCP and review information for follow-up visit,  Route STAFF MESSAGE with patient name to the  Pool for scheduling with the following information:            -  Timing of next visit           -  Visit type ie Physical, OV, etc           -  Diagnoses/Reason ie. COPD, HTN - Do not use MEDICATION, Follow-up or CHECK UP - Give reason for visit      Next Appointment:   Future Appointments   Date Time Provider Department Center   9/24/2024 10:00 AM Kasandra Duncan MD AND ENDO [facility]   1/7/2025 10:00 AM Jeb Cobain MD AND PULM [facility]   3/21/2025  2:30 PM Michael Rice DO [facility]  [place] [facility] [place]       Message sent to  to schedule appt with patient?  NO      Requested Prescriptions     Pending Prescriptions Disp Refills    metoprolol succinate (TOPROL XL) 50 MG extended release tablet 60 tablet 5     Sig: Take 1 tablet by mouth 2 times daily

## 2024-07-02 RX ORDER — METOPROLOL SUCCINATE 50 MG/1
50 TABLET, EXTENDED RELEASE ORAL 2 TIMES DAILY
Qty: 60 TABLET | Refills: 5 | OUTPATIENT
Start: 2024-07-02

## 2024-07-07 PROBLEM — J06.9 URI (UPPER RESPIRATORY INFECTION): Status: RESOLVED | Noted: 2024-06-07 | Resolved: 2024-07-07

## 2024-07-26 DIAGNOSIS — L30.4 INTERTRIGO: ICD-10-CM

## 2024-07-26 RX ORDER — KETOCONAZOLE 20 MG/G
CREAM TOPICAL
Qty: 60 G | Refills: 5 | Status: SHIPPED | OUTPATIENT
Start: 2024-07-26

## 2024-07-26 NOTE — TELEPHONE ENCOUNTER
Refill Request     CONFIRM preferred pharmacy with the patient.    If Mail Order Rx - Pend for 90 day refill.      Last Seen: Last Seen Department: 6/11/2024  Last Seen by PCP: 3/21/2024    Last Written: 7/3/23 Disp 60g - 5 refills    If no future appointment scheduled:  Review the last OV with PCP and review information for follow-up visit,  Route STAFF MESSAGE with patient name to the  Pool for scheduling with the following information:            -  Timing of next visit           -  Visit type ie Physical, OV, etc           -  Diagnoses/Reason ie. COPD, HTN - Do not use MEDICATION, Follow-up or CHECK UP - Give reason for visit      Next Appointment:   Future Appointments   Date Time Provider Department Center   9/24/2024 10:00 AM Kasandra Duncan MD AND ENDO [facility]   1/7/2025 10:00 AM Jeb Cobian MD AND PULM [facility]   3/21/2025  2:30 PM Michael Rice DO [facility]  [place] [facility] [place]       Message sent to  to schedule appt with patient?  NO      Requested Prescriptions     Pending Prescriptions Disp Refills    ketoconazole (NIZORAL) 2 % cream 60 g 5     Sig: Apply to red scaly rash on the groin twice daily as needed.        no acute findings

## 2024-08-29 ENCOUNTER — PATIENT MESSAGE (OUTPATIENT)
Dept: ENDOCRINOLOGY | Age: 84
End: 2024-08-29

## 2024-09-10 DIAGNOSIS — E11.65 TYPE 2 DIABETES MELLITUS WITH HYPERGLYCEMIA, WITHOUT LONG-TERM CURRENT USE OF INSULIN (HCC): ICD-10-CM

## 2024-09-11 ENCOUNTER — TELEPHONE (OUTPATIENT)
Dept: ENDOCRINOLOGY | Age: 84
End: 2024-09-11

## 2024-09-11 ENCOUNTER — TELEMEDICINE (OUTPATIENT)
Dept: ENDOCRINOLOGY | Age: 84
End: 2024-09-11
Payer: COMMERCIAL

## 2024-09-11 DIAGNOSIS — K21.9 GASTROESOPHAGEAL REFLUX DISEASE, UNSPECIFIED WHETHER ESOPHAGITIS PRESENT: ICD-10-CM

## 2024-09-11 DIAGNOSIS — E11.65 TYPE 2 DIABETES MELLITUS WITH HYPERGLYCEMIA, WITHOUT LONG-TERM CURRENT USE OF INSULIN (HCC): ICD-10-CM

## 2024-09-11 DIAGNOSIS — E11.65 TYPE 2 DIABETES MELLITUS WITH HYPERGLYCEMIA, WITHOUT LONG-TERM CURRENT USE OF INSULIN (HCC): Primary | ICD-10-CM

## 2024-09-11 DIAGNOSIS — E78.2 MIXED HYPERLIPIDEMIA: ICD-10-CM

## 2024-09-11 LAB
ALBUMIN SERPL-MCNC: 4.3 G/DL (ref 3.4–5)
ALBUMIN/GLOB SERPL: 2.3 {RATIO} (ref 1.1–2.2)
ALP SERPL-CCNC: 54 U/L (ref 40–129)
ALT SERPL-CCNC: 41 U/L (ref 10–40)
ANION GAP SERPL CALCULATED.3IONS-SCNC: 13 MMOL/L (ref 3–16)
AST SERPL-CCNC: 34 U/L (ref 15–37)
BILIRUB SERPL-MCNC: 0.3 MG/DL (ref 0–1)
BUN SERPL-MCNC: 18 MG/DL (ref 7–20)
CALCIUM SERPL-MCNC: 10.3 MG/DL (ref 8.3–10.6)
CHLORIDE SERPL-SCNC: 103 MMOL/L (ref 99–110)
CHOLEST SERPL-MCNC: 136 MG/DL (ref 0–199)
CO2 SERPL-SCNC: 23 MMOL/L (ref 21–32)
CREAT SERPL-MCNC: 0.8 MG/DL (ref 0.8–1.3)
CREAT UR-MCNC: 86.3 MG/DL (ref 39–259)
EST. AVERAGE GLUCOSE BLD GHB EST-MCNC: 145.6 MG/DL
GFR SERPLBLD CREATININE-BSD FMLA CKD-EPI: 88 ML/MIN/{1.73_M2}
GLUCOSE SERPL-MCNC: 120 MG/DL (ref 70–99)
HBA1C MFR BLD: 6.7 %
HDLC SERPL-MCNC: 52 MG/DL (ref 40–60)
LDLC SERPL CALC-MCNC: 58 MG/DL
MICROALBUMIN UR DL<=1MG/L-MCNC: 1.83 MG/DL
MICROALBUMIN/CREAT UR: 21.2 MG/G (ref 0–30)
POTASSIUM SERPL-SCNC: 4.5 MMOL/L (ref 3.5–5.1)
PROT SERPL-MCNC: 6.2 G/DL (ref 6.4–8.2)
SODIUM SERPL-SCNC: 139 MMOL/L (ref 136–145)
TRIGL SERPL-MCNC: 131 MG/DL (ref 0–150)
VLDLC SERPL CALC-MCNC: 26 MG/DL

## 2024-09-11 PROCEDURE — 3044F HG A1C LEVEL LT 7.0%: CPT | Performed by: INTERNAL MEDICINE

## 2024-09-11 PROCEDURE — 99214 OFFICE O/P EST MOD 30 MIN: CPT | Performed by: INTERNAL MEDICINE

## 2024-09-11 PROCEDURE — 1123F ACP DISCUSS/DSCN MKR DOCD: CPT | Performed by: INTERNAL MEDICINE

## 2024-09-11 PROCEDURE — G2211 COMPLEX E/M VISIT ADD ON: HCPCS | Performed by: INTERNAL MEDICINE

## 2024-09-11 RX ORDER — ORAL SEMAGLUTIDE 7 MG/1
TABLET ORAL
Qty: 90 TABLET | Refills: 0 | OUTPATIENT
Start: 2024-09-11

## 2024-09-11 RX ORDER — ORAL SEMAGLUTIDE 14 MG/1
TABLET ORAL
Qty: 90 TABLET | Refills: 1 | Status: SHIPPED | OUTPATIENT
Start: 2024-09-11

## 2024-09-11 RX ORDER — BLOOD-GLUCOSE SENSOR
EACH MISCELLANEOUS
Qty: 2 EACH | Refills: 5 | Status: SHIPPED | OUTPATIENT
Start: 2024-09-11

## 2024-09-11 RX ORDER — SEMAGLUTIDE 0.68 MG/ML
INJECTION, SOLUTION SUBCUTANEOUS
Qty: 9 ML | Refills: 1 | Status: SHIPPED | OUTPATIENT
Start: 2024-09-11 | End: 2024-09-11 | Stop reason: ALTCHOICE

## 2024-09-12 RX ORDER — MECOBALAMIN 5000 MCG
15 TABLET,DISINTEGRATING ORAL DAILY
Qty: 90 CAPSULE | Refills: 1 | Status: SHIPPED | OUTPATIENT
Start: 2024-09-12

## 2024-09-12 RX ORDER — BLOOD-GLUCOSE SENSOR
1 EACH MISCELLANEOUS
Qty: 2 EACH | Refills: 5 | Status: SHIPPED | OUTPATIENT
Start: 2024-09-12

## 2024-09-16 ENCOUNTER — HOSPITAL ENCOUNTER (OUTPATIENT)
Dept: CT IMAGING | Age: 84
Discharge: HOME | End: 2024-09-16
Attending: INTERNAL MEDICINE
Payer: COMMERCIAL

## 2024-09-16 DIAGNOSIS — C34.11 MALIGNANT NEOPLASM OF UPPER LOBE, RIGHT BRONCHUS OR LUNG (HCC): ICD-10-CM

## 2024-09-16 LAB
PERFORMED ON: NORMAL
POC CREATININE: 0.9 MG/DL (ref 0.8–1.3)
POC SAMPLE TYPE: NORMAL

## 2024-09-16 PROCEDURE — 2500000003 HC RX 250 WO HCPCS: Performed by: INTERNAL MEDICINE

## 2024-09-16 PROCEDURE — 6360000004 HC RX CONTRAST MEDICATION: Performed by: INTERNAL MEDICINE

## 2024-09-16 PROCEDURE — 82565 ASSAY OF CREATININE: CPT

## 2024-09-16 PROCEDURE — 74177 CT ABD & PELVIS W/CONTRAST: CPT

## 2024-09-16 RX ORDER — IOPAMIDOL 755 MG/ML
75 INJECTION, SOLUTION INTRAVASCULAR
Status: COMPLETED | OUTPATIENT
Start: 2024-09-16 | End: 2024-09-16

## 2024-09-16 RX ADMIN — BARIUM SULFATE 900 ML: 20 SUSPENSION ORAL at 11:16

## 2024-09-16 RX ADMIN — IOPAMIDOL 75 ML: 755 INJECTION, SOLUTION INTRAVENOUS at 11:16

## 2024-10-11 DIAGNOSIS — I10 ESSENTIAL HYPERTENSION: ICD-10-CM

## 2024-10-11 DIAGNOSIS — I25.810 CORONARY ARTERY DISEASE INVOLVING CORONARY BYPASS GRAFT OF NATIVE HEART WITHOUT ANGINA PECTORIS: ICD-10-CM

## 2024-10-11 DIAGNOSIS — E11.65 TYPE 2 DIABETES MELLITUS WITH HYPERGLYCEMIA, WITHOUT LONG-TERM CURRENT USE OF INSULIN (HCC): ICD-10-CM

## 2024-10-11 RX ORDER — METOPROLOL SUCCINATE 50 MG/1
50 TABLET, EXTENDED RELEASE ORAL 2 TIMES DAILY
Qty: 60 TABLET | Refills: 5 | Status: SHIPPED | OUTPATIENT
Start: 2024-10-11

## 2024-10-11 NOTE — TELEPHONE ENCOUNTER
Refill Request     CONFIRM preferred pharmacy with the patient.    If Mail Order Rx - Pend for 90 day refill.      Last Seen: Last Seen Department: 6/11/2024  Last Seen by PCP: 3/21/2024    Last Written: 3/21/24 #60 - 5 refills     If no future appointment scheduled:  Review the last OV with PCP and review information for follow-up visit,  Route STAFF MESSAGE with patient name to the  Pool for scheduling with the following information:            -  Timing of next visit           -  Visit type ie Physical, OV, etc           -  Diagnoses/Reason ie. COPD, HTN - Do not use MEDICATION, Follow-up or CHECK UP - Give reason for visit      Next Appointment:   Future Appointments   Date Time Provider Department Center   10/17/2024  1:00 PM Michelle Winchester RD [facility]   1/7/2025 10:00 AM Jeb Cobian MD AND PULM [facility]   3/21/2025  2:30 PM Michael Rice DO [facility]       Message sent to  to schedule appt with patient?  NO      Requested Prescriptions     Pending Prescriptions Disp Refills    metoprolol succinate (TOPROL XL) 50 MG extended release tablet 60 tablet 5     Sig: Take 1 tablet by mouth 2 times daily

## 2024-10-14 RX ORDER — EMPAGLIFLOZIN 10 MG/1
10 TABLET, FILM COATED ORAL DAILY
Qty: 90 TABLET | Refills: 1 | OUTPATIENT
Start: 2024-10-14

## 2024-10-17 ENCOUNTER — OFFICE VISIT (OUTPATIENT)
Dept: ENDOCRINOLOGY | Age: 84
End: 2024-10-17
Payer: COMMERCIAL

## 2024-10-17 DIAGNOSIS — E11.65 TYPE 2 DIABETES MELLITUS WITH HYPERGLYCEMIA, WITHOUT LONG-TERM CURRENT USE OF INSULIN (HCC): Primary | ICD-10-CM

## 2024-10-17 PROCEDURE — 97802 MEDICAL NUTRITION INDIV IN: CPT

## 2024-10-17 PROCEDURE — 3044F HG A1C LEVEL LT 7.0%: CPT

## 2024-10-17 NOTE — PROGRESS NOTES
change every 14 days 2 each 5    Semaglutide (RYBELSUS) 14 MG TABS 1 tab AM 90 tablet 1    ketoconazole (NIZORAL) 2 % cream Apply to red scaly rash on the groin twice daily as needed. 60 g 5    famotidine (PEPCID) 20 MG tablet Take 1 tablet by mouth 2 times daily for 7 days 14 tablet 0    fluticasone (FLONASE) 50 MCG/ACT nasal spray 1 spray by Each Nostril route daily 32 g 1    Lancets (ONETOUCH DELICA PLUS [id number]) MISC TEST TWICE DAILY 100 each 11    empagliflozin (JARDIANCE) 25 MG tablet Take 1 tablet by mouth daily 90 tablet 1    metFORMIN (GLUCOPHAGE) 500 MG tablet Take 1 tablet by mouth 2 times daily (with meals) 180 tablet 1    lisinopril (PRINIVIL;ZESTRIL) 10 MG tablet Take 3 tablets by mouth daily 90 tablet 2    blood glucose test strips (ONETOUCH VERIO) strip TEST TWICE DAILY AND AS NEEDED FOR SYMPTOMS OF IRREGULAR BLOOD GLUCOSE 100 strip 11    clopidogrel (PLAVIX) 75 MG tablet Take 1 tablet by mouth in the morning and at bedtime 60 tablet 0    rosuvastatin (CRESTOR) 40 MG tablet Take 1 tablet by mouth every evening 90 tablet 1    ranolazine (RANEXA) 500 MG extended release tablet Take 1 tablet by mouth 2 times daily      magnesium oxide (MAG-OX) 400 (240 Mg) MG tablet 100mg daily      LORazepam (ATIVAN) 1 MG tablet TAKE 1 TABLET BY MOUTH PRE-PROCEDURE      diazePAM (VALIUM) 10 MG tablet As needed      furosemide (LASIX) 20 MG tablet Take 1 tablet by mouth daily as needed As needed (Patient not taking: Reported on 9/11/2024)      Magnesium Bisglycinate (MAG GLYCINATE) 100 MG TABS Take 200 mg by mouth 2 times daily      nitroGLYCERIN (NITROSTAT) 0.4 MG SL tablet PLACE 1 TABLET UNDER TONGUE EVERY 5 MINUTES AS NEEDED FOR CHEST PAIN      omega-3 acid ethyl esters (LOVAZA) 1 g capsule Take 4 capsules by mouth daily      Naproxen Sodium (ALEVE) 220 MG CAPS Take by mouth As needed      Misc. Devices (CPAP MACHINE) MISC by Does not apply route      UNABLE TO FIND Tonic water      albuterol sulfate HFA (VENTOLIN

## 2024-12-05 ENCOUNTER — PATIENT MESSAGE (OUTPATIENT)
Dept: ENDOCRINOLOGY | Age: 84
End: 2024-12-05

## 2024-12-05 ENCOUNTER — TELEPHONE (OUTPATIENT)
Dept: ENDOCRINOLOGY | Age: 84
End: 2024-12-05

## 2024-12-05 DIAGNOSIS — E11.65 TYPE 2 DIABETES MELLITUS WITH HYPERGLYCEMIA, WITHOUT LONG-TERM CURRENT USE OF INSULIN (HCC): ICD-10-CM

## 2024-12-05 NOTE — TELEPHONE ENCOUNTER
Refill is needed    empagliflozin (JARDIANCE) 25 MG tablet     [id number] - [place], [place] - [address] - P [phone] - F [phone] [address] [facility] [address]  Phone: [phone]  Fax: [phone]

## 2024-12-05 NOTE — TELEPHONE ENCOUNTER
Requested Prescriptions     Signed Prescriptions Disp Refills    empagliflozin (JARDIANCE) 25 MG tablet 90 tablet 1     Sig: Take 1 tablet by mouth daily     Authorizing Provider: KARY PATINO     Ordering User: DEBI JORGENSEN

## 2024-12-11 ENCOUNTER — OFFICE VISIT (OUTPATIENT)
Dept: ENDOCRINOLOGY | Age: 84
End: 2024-12-11

## 2024-12-11 VITALS — BODY MASS INDEX: 34.14 KG/M2 | HEIGHT: 67 IN

## 2024-12-11 DIAGNOSIS — R10.9 ABDOMINAL PAIN IN MALE: ICD-10-CM

## 2024-12-11 DIAGNOSIS — E11.65 TYPE 2 DIABETES MELLITUS WITH HYPERGLYCEMIA, WITHOUT LONG-TERM CURRENT USE OF INSULIN (HCC): Primary | ICD-10-CM

## 2024-12-11 PROCEDURE — G2211 COMPLEX E/M VISIT ADD ON: HCPCS | Performed by: INTERNAL MEDICINE

## 2024-12-11 PROCEDURE — 1123F ACP DISCUSS/DSCN MKR DOCD: CPT | Performed by: INTERNAL MEDICINE

## 2024-12-11 PROCEDURE — 3044F HG A1C LEVEL LT 7.0%: CPT | Performed by: INTERNAL MEDICINE

## 2024-12-11 PROCEDURE — 99215 OFFICE O/P EST HI 40 MIN: CPT | Performed by: INTERNAL MEDICINE

## 2024-12-11 NOTE — PROGRESS NOTES
Spoke to patient he states that his pain has resolved and nausea and vomiting  has stopped. She states that he will go to ER if s/s to returns. He is asking if he should hold rybelsus due to gi issues. He states that his belly is still distended but not painful.   
Potassium 09/10/2024 4.5  3.5 - 5.1 mmol/L Final    Chloride 09/10/2024 103  99 - 110 mmol/L Final    CO2 09/10/2024 23  21 - 32 mmol/L Final    Anion Gap 09/10/2024 13  3 - 16 Final    Glucose 09/10/2024 120 (H)  70 - 99 mg/dL Final    BUN 09/10/2024 18  7 - 20 mg/dL Final    Creatinine 09/10/2024 0.8  0.8 - 1.3 mg/dL Final    Est, Glom Filt Rate 09/10/2024 88  >60 Final    Calcium 09/10/2024 10.3  8.3 - 10.6 mg/dL Final    Total Protein 09/10/2024 6.2 (L)  6.4 - 8.2 g/dL Final    Albumin 09/10/2024 4.3  3.4 - 5.0 g/dL Final    Albumin/Globulin Ratio 09/10/2024 2.3 (H)  1.1 - 2.2 Final    Total Bilirubin 09/10/2024 0.3  0.0 - 1.0 mg/dL Final    Alkaline Phosphatase 09/10/2024 54  40 - 129 U/L Final    ALT 09/10/2024 41 (H)  10 - 40 U/L Final    AST 09/10/2024 34  15 - 37 U/L Final    Hemoglobin A1C 09/10/2024 6.7  See comment % Final    Estimated Avg Glucose 09/10/2024 145.6  mg/dL Final    Cholesterol, Total 09/10/2024 136  0 - 199 mg/dL Final    Triglycerides 09/10/2024 131  0 - 150 mg/dL Final    HDL 09/10/2024 52  40 - 60 mg/dL Final    LDL Cholesterol 09/10/2024 58  <100 mg/dL Final    VLDL Cholesterol Calculated 09/10/2024 26  Not Established mg/dL Final    Microalb, Ur 09/10/2024 1.83  <2.0 mg/dL Final    Creatinine, Ur 09/10/2024 86.3  39.0 - 259.0 mg/dL Final    Microalb/Creat Ratio 09/10/2024 21.2  0.0 - 30.0 mg/g Final           Assessment and Plan     1. Type 2 diabetes mellitus with hyperglycemia, without long-term current use of insulin (HCC)  - The target A1c for this patient is <7-7.5%, given the age and PMH, provided this can be achieved with no significant hypoglycemia.   - Reviewed recent labs, blood sugars and A1c, current diabetes regimen, food intake and meal times. I also reviewed all available self-testing BG results.     Hemoglobin A1c at 6.7%.  Overall glycemic control is reasonable.  Continue current regimen for now.  If abdominal pain is related to pancreatitis or intestinal obstruction

## 2024-12-12 ENCOUNTER — PATIENT MESSAGE (OUTPATIENT)
Dept: ENDOCRINOLOGY | Age: 84
End: 2024-12-12

## 2024-12-12 DIAGNOSIS — E11.65 TYPE 2 DIABETES MELLITUS WITH HYPERGLYCEMIA, WITHOUT LONG-TERM CURRENT USE OF INSULIN (HCC): Primary | ICD-10-CM

## 2024-12-12 RX ORDER — LANCETS 33 GAUGE
EACH MISCELLANEOUS
Qty: 200 EACH | Refills: 11 | Status: SHIPPED | OUTPATIENT
Start: 2024-12-12

## 2024-12-12 RX ORDER — GLIMEPIRIDE 2 MG/1
2 TABLET ORAL
Qty: 90 TABLET | Refills: 1 | Status: SHIPPED | OUTPATIENT
Start: 2024-12-12

## 2024-12-12 RX ORDER — GLUCOSAMINE HCL/CHONDROITIN SU 500-400 MG
CAPSULE ORAL
Qty: 200 STRIP | Refills: 3 | Status: SHIPPED | OUTPATIENT
Start: 2024-12-12

## 2024-12-13 DIAGNOSIS — E11.69 TYPE 2 DIABETES MELLITUS WITH OTHER SPECIFIED COMPLICATION, WITHOUT LONG-TERM CURRENT USE OF INSULIN (HCC): ICD-10-CM

## 2024-12-14 NOTE — TELEPHONE ENCOUNTER
Refill Request     CONFIRM preferred pharmacy with the patient.    If Mail Order Rx - Pend for 90 day refill.      Last Seen: Last Seen Department: 6/11/2024  Last Seen by PCP: 3/21/2024    Last Written: 5/7/2024     If no future appointment scheduled:  Review the last OV with PCP and review information for follow-up visit,  Route STAFF MESSAGE with patient name to the  Pool for scheduling with the following information:            -  Timing of next visit           -  Visit type ie Physical, OV, etc           -  Diagnoses/Reason ie. COPD, HTN - Do not use MEDICATION, Follow-up or CHECK UP - Give reason for visit      Next Appointment:   Future Appointments   Date Time Provider Department Center   1/6/2025 11:00 AM [facility]   2/5/2025 10:00 AM Arden Dhaliwal MD AND [facility]   3/21/2025  2:30 PM Michael Rice DO [facility] E[facility] DEP       Message sent to  to schedule appt with patient?  NO      Requested Prescriptions     Pending Prescriptions Disp Refills    ONETOUCH VERIO strip [Pharmacy Med Name: ONE TOUCH VERIO TEST ST(NEW)100S] 100 strip 11     Sig: TEST TWICE DAILY AND AS NEEDED FOR SYMPTOMS OF IRREGULAR BLOOD GLUCOSE

## 2024-12-16 RX ORDER — BLOOD SUGAR DIAGNOSTIC
STRIP MISCELLANEOUS
Qty: 100 STRIP | Refills: 11 | Status: SHIPPED | OUTPATIENT
Start: 2024-12-16

## 2025-01-06 ENCOUNTER — HOSPITAL ENCOUNTER (OUTPATIENT)
Dept: CT IMAGING | Age: 85
Discharge: HOME | End: 2025-01-06
Attending: INTERNAL MEDICINE
Payer: COMMERCIAL

## 2025-01-06 DIAGNOSIS — C34.11 MALIGNANT NEOPLASM OF UPPER LOBE OF RIGHT LUNG (HCC): ICD-10-CM

## 2025-01-06 LAB
PERFORMED ON: NORMAL
POC CREATININE: 1.1 MG/DL (ref 0.8–1.3)
POC SAMPLE TYPE: NORMAL

## 2025-01-06 PROCEDURE — 71260 CT THORAX DX C+: CPT

## 2025-01-06 PROCEDURE — 2500000003 HC RX 250 WO HCPCS: Performed by: INTERNAL MEDICINE

## 2025-01-06 PROCEDURE — 82565 ASSAY OF CREATININE: CPT

## 2025-01-06 PROCEDURE — 6360000004 HC RX CONTRAST MEDICATION: Performed by: INTERNAL MEDICINE

## 2025-01-06 RX ORDER — IOPAMIDOL 755 MG/ML
75 INJECTION, SOLUTION INTRAVASCULAR
Status: COMPLETED | OUTPATIENT
Start: 2025-01-06 | End: 2025-01-06

## 2025-01-06 RX ADMIN — BARIUM SULFATE 900 ML: 20 SUSPENSION ORAL at 11:33

## 2025-01-06 RX ADMIN — IOPAMIDOL 75 ML: 755 INJECTION, SOLUTION INTRAVENOUS at 11:33

## 2025-02-05 ENCOUNTER — OFFICE VISIT (OUTPATIENT)
Dept: PULMONOLOGY | Age: 85
End: 2025-02-05
Payer: COMMERCIAL

## 2025-02-05 VITALS
WEIGHT: 218 LBS | TEMPERATURE: 97.4 F | SYSTOLIC BLOOD PRESSURE: 99 MMHG | HEART RATE: 64 BPM | DIASTOLIC BLOOD PRESSURE: 56 MMHG | OXYGEN SATURATION: 94 % | HEIGHT: 67 IN | RESPIRATION RATE: 18 BRPM | BODY MASS INDEX: 34.21 KG/M2

## 2025-02-05 DIAGNOSIS — G47.33 OSA (OBSTRUCTIVE SLEEP APNEA): ICD-10-CM

## 2025-02-05 DIAGNOSIS — Z85.118 HISTORY OF LUNG CANCER: ICD-10-CM

## 2025-02-05 DIAGNOSIS — R06.02 SOB (SHORTNESS OF BREATH): Primary | ICD-10-CM

## 2025-02-05 PROCEDURE — 3074F SYST BP LT 130 MM HG: CPT | Performed by: STUDENT IN AN ORGANIZED HEALTH CARE EDUCATION/TRAINING PROGRAM

## 2025-02-05 PROCEDURE — G2211 COMPLEX E/M VISIT ADD ON: HCPCS | Performed by: STUDENT IN AN ORGANIZED HEALTH CARE EDUCATION/TRAINING PROGRAM

## 2025-02-05 PROCEDURE — 3078F DIAST BP <80 MM HG: CPT | Performed by: STUDENT IN AN ORGANIZED HEALTH CARE EDUCATION/TRAINING PROGRAM

## 2025-02-05 PROCEDURE — 99214 OFFICE O/P EST MOD 30 MIN: CPT | Performed by: STUDENT IN AN ORGANIZED HEALTH CARE EDUCATION/TRAINING PROGRAM

## 2025-02-05 PROCEDURE — 1123F ACP DISCUSS/DSCN MKR DOCD: CPT | Performed by: STUDENT IN AN ORGANIZED HEALTH CARE EDUCATION/TRAINING PROGRAM

## 2025-02-05 RX ORDER — BRINZOLAMIDE/BRIMONIDINE TARTRATE 10; 2 MG/ML; MG/ML
SUSPENSION/ DROPS OPHTHALMIC
COMMUNITY

## 2025-02-05 RX ORDER — FLUTICASONE FUROATE, UMECLIDINIUM BROMIDE AND VILANTEROL TRIFENATATE 100; 62.5; 25 UG/1; UG/1; UG/1
1 POWDER RESPIRATORY (INHALATION) DAILY
Qty: 2 EACH | Refills: 0 | Status: SHIPPED | COMMUNITY
Start: 2025-02-05

## 2025-02-05 ASSESSMENT — ENCOUNTER SYMPTOMS
EYE ITCHING: 0
STRIDOR: 0
COLOR CHANGE: 0
EYE REDNESS: 0
ABDOMINAL PAIN: 0
WHEEZING: 0
DIARRHEA: 0
SORE THROAT: 0
NAUSEA: 0
EYE PAIN: 0
VOMITING: 0
COUGH: 0
TROUBLE SWALLOWING: 0
SHORTNESS OF BREATH: 0
ABDOMINAL DISTENTION: 0
EYE DISCHARGE: 0
CONSTIPATION: 0
BACK PAIN: 0

## 2025-02-05 NOTE — PROGRESS NOTES
MA Communication:  The following orders are received by verbal communication from Arden Dhaliwal MD    Orders include:        Follow up 6 months  2 samples of Trelegy 100 given

## 2025-02-05 NOTE — PROGRESS NOTES
[facility] Follow-up  [address] [place] [address]  [phone]        Bassam Lin (: 1940 ) is a 84 y.o. male here for an evaluation of   Chief Complaint   Patient presents with    Follow-up     6 mo    Shortness of Breath     Sees [facility] for sleep Aranza pt.         SUBJECTIVE/OBJECTIVE:  Patient is a 84-year-old male with significant past medical history of lung cancer status post lobectomy, lung nodule that presents to [facility] pulmonary clinic for follow-up visit.  Patient has no complaints today.  He is on Trelegy with albuterol as needed for shortness of breath.  He says at times he forgets to take his Trelegy.  Patient is also on PAP therapy for ANDRE but sees a sleep specialist at [facility]..       Review of Systems   Constitutional:  Negative for activity change, appetite change, chills, diaphoresis and fatigue.   HENT:  Negative for congestion, sore throat and trouble swallowing.    Eyes:  Negative for pain, discharge, redness and itching.   Respiratory:  Negative for cough, shortness of breath, wheezing and stridor.    Cardiovascular:  Negative for chest pain, palpitations and leg swelling.   Gastrointestinal:  Negative for abdominal distention, abdominal pain, constipation, diarrhea, nausea and vomiting.   Endocrine: Negative for polydipsia, polyphagia and polyuria.   Genitourinary:  Negative for difficulty urinating.   Musculoskeletal:  Negative for back pain, myalgias and neck pain.   Skin:  Negative for color change.   Neurological:  Negative for dizziness, weakness and light-headedness.   Psychiatric/Behavioral:  Negative for agitation and behavioral problems.          Vitals:    25 1025   BP: (!) 99/56   Pulse: 64   Resp: 18   Temp: 97.4 °F (36.3 °C)   TempSrc: Temporal   SpO2: 94%   Weight: 98.9 kg (218 lb)   Height: 1.702 m (5' 7\")        Physical Exam  Constitutional:       General: He is not in acute distress.     Appearance: He is not

## 2025-02-05 NOTE — PATIENT INSTRUCTIONS
Remember to bring a list of pulmonary medications and any CPAP or BiPAP machines to your next appointment with the office.     Please keep all of your future appointments scheduled by [facility] [place] [facility] office. Out of respect for other patients and providers, you may be asked to reschedule your appointment if you arrive later than your scheduled appointment time. Appointments cancelled less than 24hrs in advance will be considered a no show. Patients with three missed appointments within 1 year or four missed appointments within 2 years can be dismissed from the practice.     Please be aware that our physicians are required to work in the Intensive Care Unit at [facility].  Your appointment may need to be rescheduled if they are designated to work during your appointment time.      You may receive a survey regarding the care you received during your visit.  Your input is valuable to us.  We encourage you to complete and return your survey.  We hope you will choose us in the future for your healthcare needs.     Pt instructed of all future appointment dates & times, including radiology, labs, procedures & referrals. If procedures were scheduled preparation instructions provided. Instructions on future appointments with [facility] were given.     In the next few weeks, you will be receiving a survey from [facility] regarding your visit today.  We would greatly appreciate it if you would take just a few minutes to fill that out.  It is very important to us that our patients receive top notch care and our surveys help keep us accountable. However, if your experience was not a good one, we want to hear about that as well. This is a key way we can keep track of problems and strive to correct any for future visits.    Again, we appreciate your time and thank you for choosing [facility]!        Mitch CMA

## 2025-03-07 ENCOUNTER — TELEPHONE (OUTPATIENT)
Dept: FAMILY MEDICINE CLINIC | Age: 85
End: 2025-03-07

## 2025-03-07 NOTE — TELEPHONE ENCOUNTER
Patient called office for referral to dermatology or plastic surgery for facial lesion.    Given phone number to [facility] in [place] [phone]   & [facility] plastic surgery Dr. Hess [phone].    Please advise on referrals.

## 2025-03-21 ENCOUNTER — OFFICE VISIT (OUTPATIENT)
Dept: FAMILY MEDICINE CLINIC | Age: 85
End: 2025-03-21

## 2025-03-21 VITALS
OXYGEN SATURATION: 96 % | BODY MASS INDEX: 34.21 KG/M2 | HEART RATE: 67 BPM | SYSTOLIC BLOOD PRESSURE: 92 MMHG | HEIGHT: 67 IN | WEIGHT: 218 LBS | DIASTOLIC BLOOD PRESSURE: 52 MMHG

## 2025-03-21 DIAGNOSIS — I47.20 VENTRICULAR TACHYARRHYTHMIA (HCC): ICD-10-CM

## 2025-03-21 DIAGNOSIS — E78.2 MIXED HYPERLIPIDEMIA: ICD-10-CM

## 2025-03-21 DIAGNOSIS — I25.810 CORONARY ARTERY DISEASE INVOLVING CORONARY BYPASS GRAFT OF NATIVE HEART WITHOUT ANGINA PECTORIS: ICD-10-CM

## 2025-03-21 DIAGNOSIS — Z00.00 PREVENTATIVE HEALTH CARE: Primary | ICD-10-CM

## 2025-03-21 DIAGNOSIS — E11.69 TYPE 2 DIABETES MELLITUS WITH OTHER SPECIFIED COMPLICATION, WITHOUT LONG-TERM CURRENT USE OF INSULIN: ICD-10-CM

## 2025-03-21 DIAGNOSIS — I10 ESSENTIAL HYPERTENSION: ICD-10-CM

## 2025-03-21 LAB — HBA1C MFR BLD: 7.3 %

## 2025-03-21 RX ORDER — ORAL SEMAGLUTIDE 14 MG/1
14 TABLET ORAL
COMMUNITY
End: 2025-03-21 | Stop reason: ALTCHOICE

## 2025-03-21 SDOH — ECONOMIC STABILITY: FOOD INSECURITY: WITHIN THE PAST 12 MONTHS, THE FOOD YOU BOUGHT JUST DIDN'T LAST AND YOU DIDN'T HAVE MONEY TO GET MORE.: NEVER TRUE

## 2025-03-21 SDOH — ECONOMIC STABILITY: FOOD INSECURITY: WITHIN THE PAST 12 MONTHS, YOU WORRIED THAT YOUR FOOD WOULD RUN OUT BEFORE YOU GOT MONEY TO BUY MORE.: NEVER TRUE

## 2025-03-21 ASSESSMENT — PATIENT HEALTH QUESTIONNAIRE - PHQ9
10. IF YOU CHECKED OFF ANY PROBLEMS, HOW DIFFICULT HAVE THESE PROBLEMS MADE IT FOR YOU TO DO YOUR WORK, TAKE CARE OF THINGS AT HOME, OR GET ALONG WITH OTHER PEOPLE: NOT DIFFICULT AT ALL
SUM OF ALL RESPONSES TO PHQ QUESTIONS 1-9: 2
8. MOVING OR SPEAKING SO SLOWLY THAT OTHER PEOPLE COULD HAVE NOTICED. OR THE OPPOSITE, BEING SO FIGETY OR RESTLESS THAT YOU HAVE BEEN MOVING AROUND A LOT MORE THAN USUAL: NOT AT ALL
5. POOR APPETITE OR OVEREATING: SEVERAL DAYS
SUM OF ALL RESPONSES TO PHQ QUESTIONS 1-9: 2
1. LITTLE INTEREST OR PLEASURE IN DOING THINGS: NOT AT ALL
4. FEELING TIRED OR HAVING LITTLE ENERGY: SEVERAL DAYS
2. FEELING DOWN, DEPRESSED OR HOPELESS: NOT AT ALL
SUM OF ALL RESPONSES TO PHQ QUESTIONS 1-9: 2
SUM OF ALL RESPONSES TO PHQ QUESTIONS 1-9: 2
7. TROUBLE CONCENTRATING ON THINGS, SUCH AS READING THE NEWSPAPER OR WATCHING TELEVISION: NOT AT ALL
3. TROUBLE FALLING OR STAYING ASLEEP: NOT AT ALL
6. FEELING BAD ABOUT YOURSELF - OR THAT YOU ARE A FAILURE OR HAVE LET YOURSELF OR YOUR FAMILY DOWN: NOT AT ALL
9. THOUGHTS THAT YOU WOULD BE BETTER OFF DEAD, OR OF HURTING YOURSELF: NOT AT ALL

## 2025-03-21 NOTE — PROGRESS NOTES
recommend holding lisinopril for blood pressures below 110.  Fortunately he does not feel dizzy.  - metoprolol succinate (TOPROL XL) 50 MG extended release tablet; Take 1 tablet by mouth 2 times daily  Dispense: 60 tablet; Refill: 5  - lisinopril (PRINIVIL;ZESTRIL) 40 MG tablet; Take 1 tablet by mouth daily  Dispense: 90 tablet; Refill: 1    3. Mixed hyperlipidemia  Stable. Continue current medications.   - ezetimibe (ZETIA) 10 MG tablet; Take 1 tablet by mouth daily  Dispense: 90 tablet; Refill: 1  - rosuvastatin (CRESTOR) 40 MG tablet; Take 1 tablet by mouth every evening  Dispense: 90 tablet; Refill: 1  - CBC with Auto Differential; Future  - Comprehensive Metabolic Panel; Future  - Lipid Panel; Future  - Vitamin B12 & Folate; Future  - TSH with Reflex; Future    4. Coronary artery disease involving coronary bypass graft of native heart without angina pectoris  Stable. Continue current medications. Jardiance was recently added.   - metoprolol succinate (TOPROL XL) 50 MG extended release tablet; Take 1 tablet by mouth 2 times daily  Dispense: 60 tablet; Refill: 5    Continue to see cardiology for the ventricular tachyarrhythmia.    Return in about 1 year (around 3/21/2026) for Preventative.

## 2025-03-23 DIAGNOSIS — E11.65 TYPE 2 DIABETES MELLITUS WITH HYPERGLYCEMIA, WITHOUT LONG-TERM CURRENT USE OF INSULIN (HCC): ICD-10-CM

## 2025-03-23 NOTE — TELEPHONE ENCOUNTER
Refill Request     CONFIRM preferred pharmacy with the patient.    If Mail Order Rx - Pend for 90 day refill.      Last Seen: Last Seen Department: 3/21/2025  Last Seen by PCP: 3/21/2025    Last Written: 5/29/2024 180 tab 1 refills    If no future appointment scheduled:  Review the last OV with PCP and review information for follow-up visit,  Route STAFF MESSAGE with patient name to the  Pool for scheduling with the following information:            -  Timing of next visit           -  Visit type ie Physical, OV, etc           -  Diagnoses/Reason ie. COPD, HTN - Do not use MEDICATION, Follow-up or CHECK UP - Give reason for visit      Next Appointment:   Future Appointments   Date Time Provider Department Center   3/24/2025  2:30 PM SCHEDULE, [facility]   8/5/2025 10:20 AM Arden Dhaliwal MD AND [facility]       Message sent to  to schedule appt with patient?  YES  Return in about 1 year (around 3/21/2026) for Preventative.       Requested Prescriptions     Pending Prescriptions Disp Refills    metFORMIN (GLUCOPHAGE) 500 MG tablet [Pharmacy Med Name: METFORMIN 500MG TABLETS] 360 tablet      Sig: TAKE 2 TABLETS BY MOUTH TWICE DAILY WITH MEALS

## 2025-03-24 ENCOUNTER — CLINICAL SUPPORT (OUTPATIENT)
Dept: FAMILY MEDICINE CLINIC | Age: 85
End: 2025-03-24

## 2025-03-24 DIAGNOSIS — E11.65 TYPE 2 DIABETES MELLITUS WITH HYPERGLYCEMIA, WITHOUT LONG-TERM CURRENT USE OF INSULIN (HCC): Primary | ICD-10-CM

## 2025-03-24 NOTE — PROGRESS NOTES
Patient brought in Marcela 3 and was educated on how to place the sensor.  All questions answered.  Patient will have Dr. Duncan's staff input their clinic information since they are the managing provider.  Patient will call their office with any issues regarding the sensor or blood sugar.

## 2025-03-28 ENCOUNTER — TRANSCRIBE ORDERS (OUTPATIENT)
Dept: ADMINISTRATIVE | Age: 85
End: 2025-03-28

## 2025-03-28 DIAGNOSIS — R10.9 ABDOMINAL PAIN, UNSPECIFIED ABDOMINAL LOCATION: Primary | ICD-10-CM

## 2025-04-03 ENCOUNTER — HOSPITAL ENCOUNTER (OUTPATIENT)
Dept: CT IMAGING | Age: 85
Discharge: HOME | End: 2025-04-03
Payer: COMMERCIAL

## 2025-04-03 DIAGNOSIS — R10.9 ABDOMINAL PAIN, UNSPECIFIED ABDOMINAL LOCATION: ICD-10-CM

## 2025-04-03 PROCEDURE — 74177 CT ABD & PELVIS W/CONTRAST: CPT

## 2025-04-03 PROCEDURE — 6360000004 HC RX CONTRAST MEDICATION: Performed by: NURSE PRACTITIONER

## 2025-04-03 RX ORDER — IOPAMIDOL 755 MG/ML
75 INJECTION, SOLUTION INTRAVASCULAR
Status: COMPLETED | OUTPATIENT
Start: 2025-04-03 | End: 2025-04-03

## 2025-04-03 RX ADMIN — IOPAMIDOL 75 ML: 755 INJECTION, SOLUTION INTRAVENOUS at 16:24

## 2025-04-05 ENCOUNTER — APPOINTMENT (OUTPATIENT)
Dept: GENERAL RADIOLOGY | Age: 85
End: 2025-04-05
Payer: COMMERCIAL

## 2025-04-05 ENCOUNTER — HOSPITAL ENCOUNTER (EMERGENCY)
Age: 85
Discharge: HOME | End: 2025-04-05
Attending: EMERGENCY MEDICINE
Payer: COMMERCIAL

## 2025-04-05 VITALS
BODY MASS INDEX: 34.33 KG/M2 | HEART RATE: 60 BPM | OXYGEN SATURATION: 94 % | TEMPERATURE: 97.9 F | SYSTOLIC BLOOD PRESSURE: 124 MMHG | WEIGHT: 218.7 LBS | DIASTOLIC BLOOD PRESSURE: 63 MMHG | HEIGHT: 67 IN | RESPIRATION RATE: 17 BRPM

## 2025-04-05 DIAGNOSIS — K59.00 CONSTIPATION, UNSPECIFIED CONSTIPATION TYPE: ICD-10-CM

## 2025-04-05 DIAGNOSIS — R10.33 PERIUMBILICAL ABDOMINAL PAIN: Primary | ICD-10-CM

## 2025-04-05 LAB
ALBUMIN SERPL-MCNC: 4.2 G/DL (ref 3.4–5)
ALP SERPL-CCNC: 56 U/L (ref 40–129)
ALT SERPL-CCNC: 59 U/L (ref 10–40)
ANION GAP SERPL CALCULATED.3IONS-SCNC: 12 MMOL/L (ref 3–16)
AST SERPL-CCNC: 56 U/L (ref 15–37)
BASOPHILS # BLD: 0.1 K/UL (ref 0–0.2)
BASOPHILS NFR BLD: 0.9 %
BILIRUB DIRECT SERPL-MCNC: <0.1 MG/DL (ref 0–0.3)
BILIRUB INDIRECT SERPL-MCNC: 0.4 MG/DL (ref 0–1)
BILIRUB SERPL-MCNC: 0.5 MG/DL (ref 0–1)
BUN SERPL-MCNC: 17 MG/DL (ref 7–20)
CALCIUM SERPL-MCNC: 9.8 MG/DL (ref 8.3–10.6)
CHLORIDE SERPL-SCNC: 102 MMOL/L (ref 99–110)
CO2 SERPL-SCNC: 23 MMOL/L (ref 21–32)
CREAT SERPL-MCNC: 0.9 MG/DL (ref 0.8–1.3)
DEPRECATED RDW RBC AUTO: 14 % (ref 12.4–15.4)
EOSINOPHIL # BLD: 0.1 K/UL (ref 0–0.6)
EOSINOPHIL NFR BLD: 0.9 %
GFR SERPLBLD CREATININE-BSD FMLA CKD-EPI: 85 ML/MIN/{1.73_M2}
GLUCOSE SERPL-MCNC: 188 MG/DL (ref 70–99)
HCT VFR BLD AUTO: 49.8 % (ref 40.5–52.5)
HGB BLD-MCNC: 16.8 G/DL (ref 13.5–17.5)
LACTATE BLDV-SCNC: 1.8 MMOL/L (ref 0.4–2)
LIPASE SERPL-CCNC: 21 U/L (ref 13–60)
LYMPHOCYTES # BLD: 1.1 K/UL (ref 1–5.1)
LYMPHOCYTES NFR BLD: 12.2 %
MCH RBC QN AUTO: 31.6 PG (ref 26–34)
MCHC RBC AUTO-ENTMCNC: 33.8 G/DL (ref 31–36)
MCV RBC AUTO: 93.4 FL (ref 80–100)
MONOCYTES # BLD: 0.5 K/UL (ref 0–1.3)
MONOCYTES NFR BLD: 5.4 %
NEUTROPHILS # BLD: 7.1 K/UL (ref 1.7–7.7)
NEUTROPHILS NFR BLD: 80.6 %
PLATELET # BLD AUTO: 179 K/UL (ref 135–450)
PMV BLD AUTO: 9.8 FL (ref 5–10.5)
POTASSIUM SERPL-SCNC: 5.1 MMOL/L (ref 3.5–5.1)
PROT SERPL-MCNC: 6.8 G/DL (ref 6.4–8.2)
RBC # BLD AUTO: 5.33 M/UL (ref 4.2–5.9)
SODIUM SERPL-SCNC: 137 MMOL/L (ref 136–145)
WBC # BLD AUTO: 8.8 K/UL (ref 4–11)

## 2025-04-05 PROCEDURE — 99285 EMERGENCY DEPT VISIT HI MDM: CPT

## 2025-04-05 PROCEDURE — 74022 RADEX COMPL AQT ABD SERIES: CPT

## 2025-04-05 PROCEDURE — 83605 ASSAY OF LACTIC ACID: CPT

## 2025-04-05 PROCEDURE — 96374 THER/PROPH/DIAG INJ IV PUSH: CPT

## 2025-04-05 PROCEDURE — 2580000003 HC RX 258: Performed by: EMERGENCY MEDICINE

## 2025-04-05 PROCEDURE — 85025 COMPLETE CBC W/AUTO DIFF WBC: CPT

## 2025-04-05 PROCEDURE — 80048 BASIC METABOLIC PNL TOTAL CA: CPT

## 2025-04-05 PROCEDURE — 80076 HEPATIC FUNCTION PANEL: CPT

## 2025-04-05 PROCEDURE — 83690 ASSAY OF LIPASE: CPT

## 2025-04-05 PROCEDURE — 96375 TX/PRO/DX INJ NEW DRUG ADDON: CPT

## 2025-04-05 PROCEDURE — 93005 ELECTROCARDIOGRAM TRACING: CPT | Performed by: EMERGENCY MEDICINE

## 2025-04-05 PROCEDURE — 6360000002 HC RX W HCPCS: Performed by: EMERGENCY MEDICINE

## 2025-04-05 RX ORDER — MORPHINE SULFATE 2 MG/ML
1 INJECTION, SOLUTION INTRAMUSCULAR; INTRAVENOUS ONCE
Status: COMPLETED | OUTPATIENT
Start: 2025-04-05 | End: 2025-04-05

## 2025-04-05 RX ORDER — ONDANSETRON 2 MG/ML
4 INJECTION INTRAMUSCULAR; INTRAVENOUS ONCE
Status: COMPLETED | OUTPATIENT
Start: 2025-04-05 | End: 2025-04-05

## 2025-04-05 RX ORDER — MORPHINE SULFATE 4 MG/ML
4 INJECTION INTRAVENOUS
Refills: 0 | Status: DISCONTINUED | OUTPATIENT
Start: 2025-04-05 | End: 2025-04-05

## 2025-04-05 RX ORDER — 0.9 % SODIUM CHLORIDE 0.9 %
1000 INTRAVENOUS SOLUTION INTRAVENOUS ONCE
Status: COMPLETED | OUTPATIENT
Start: 2025-04-05 | End: 2025-04-05

## 2025-04-05 RX ADMIN — SODIUM CHLORIDE 1000 ML: 0.9 INJECTION, SOLUTION INTRAVENOUS at 13:01

## 2025-04-05 RX ADMIN — ONDANSETRON 4 MG: 2 INJECTION, SOLUTION INTRAMUSCULAR; INTRAVENOUS at 13:00

## 2025-04-05 RX ADMIN — MORPHINE SULFATE 1 MG: 2 INJECTION, SOLUTION INTRAMUSCULAR; INTRAVENOUS at 13:00

## 2025-04-05 ASSESSMENT — ENCOUNTER SYMPTOMS
NAUSEA: 1
ABDOMINAL PAIN: 1
DIARRHEA: 0
VOMITING: 1

## 2025-04-05 ASSESSMENT — PAIN - FUNCTIONAL ASSESSMENT: PAIN_FUNCTIONAL_ASSESSMENT: 0-10

## 2025-04-05 ASSESSMENT — PAIN SCALES - GENERAL
PAINLEVEL_OUTOF10: 5
PAINLEVEL_OUTOF10: 8

## 2025-04-05 ASSESSMENT — PAIN DESCRIPTION - LOCATION
LOCATION: ABDOMEN
LOCATION: ABDOMEN

## 2025-04-05 ASSESSMENT — PAIN DESCRIPTION - DESCRIPTORS: DESCRIPTORS: CRAMPING

## 2025-04-05 NOTE — DISCHARGE INSTRUCTIONS
Try over the counter Miralax for constipation.  May also consider Dulcolax or Fleet enema.    Start daily Metamucil psyllium husk fiber once your bowel movements resume.

## 2025-04-05 NOTE — ED PROVIDER NOTES
THE [facility]  EMERGENCY DEPARTMENT ENCOUNTER          ATTENDING PHYSICIAN NOTE       Date of evaluation: 4/5/2025    Chief Complaint     Abdominal Pain (Pt endorses nausea, vomiting, abdominal pain. Had gallbladder removed, hx of SBO )      History of Present Illness     Bassam Lin is a 85 y.o. male with a history of prior appendectomy as well as open cholecystectomy for gangrenous cholecystitis in 2019 who presents with complaints of abdominal pain developing last night.  The pain is in the central portion of his abdomen.  He has been dry heaving.  His last bowel movement was yesterday and was only a small amount.  He has had chronic diarrhea since his surgery in 2019 but over the last 5 days he has not had diarrhea.  He denies fever or urinary symptoms.    ASSESSMENT / PLAN  (MEDICAL DECISION MAKING)     INITIAL VITALS: BP: 129/78, Temp: 97.9 °F (36.6 °C), Pulse: 61, Respirations: 18, SpO2: 96 %      Bassam Lin is a 85 y.o. male with a history of prior appendectomy as well as open cholecystectomy for gangrenous cholecystitis in 2019 here with abdominal pain and dry heaves.  The pain is mostly periumbilical.  Tenderness is only minimal in the periumbilical and upper abdomen.  Lab evaluation was completely unremarkable and acute abdominal series did not show any evidence of obstruction but did show a large stool burden.  In chart review, this patient had a CT scan of his abdomen and pelvis 2 days ago which did not demonstrate any acute abnormality.  Based on the patient's history it sounds as if he has been constipated over the last several days which is unusual for him as he usually has been experiencing ongoing chronic diarrhea.  I suspect with the findings on x-ray and normal labs, that he may have constipation causing some of his symptoms and will advise laxatives until his bowel movements returned to his baseline at which point he may consider fiber.  He is agreeable with this plan and we did

## 2025-04-05 NOTE — ED NOTES
Patient complains of abdominal pain 10/10.  Hx of chronic diarrhea.  PIV placed using aseptic technique per hospital policy.  Blood collected and sent to lab.     Kristen Fletcher RN  04/05/25 [id number]

## 2025-04-05 NOTE — ED NOTES
Patient discharged to home via family.  Written discharge instructions reviewed with understanding.  Copy of AVS sent home with patient. Patient able to walk from ED without assistance.       Kristen Fletcher RN  04/05/25 8311

## 2025-04-06 LAB
EKG ATRIAL RATE: 61 BPM
EKG DIAGNOSIS: NORMAL
EKG P-R INTERVAL: 304 MS
EKG Q-T INTERVAL: 430 MS
EKG QRS DURATION: 92 MS
EKG QTC CALCULATION (BAZETT): 432 MS
EKG R AXIS: -9 DEGREES
EKG T AXIS: 39 DEGREES
EKG VENTRICULAR RATE: 61 BPM

## 2025-04-07 ENCOUNTER — TELEPHONE (OUTPATIENT)
Dept: FAMILY MEDICINE CLINIC | Age: 85
End: 2025-04-07

## 2025-04-07 NOTE — TELEPHONE ENCOUNTER
ED Follow Up Call/ Schedule appt   ED: [facility]  Reason: ABD pain  Date: 4/5/25    Appt scheduled:       Comments:     Left voicemail for patient to call the office back to schedule a ED follow up appointment.

## 2025-04-08 ENCOUNTER — TELEPHONE (OUTPATIENT)
Dept: FAMILY MEDICINE CLINIC | Age: 85
End: 2025-04-08

## 2025-04-08 NOTE — TELEPHONE ENCOUNTER
Spoke with patient he stated that he is following up with the GI doctor.     Patient scheduled with Dr. Rice to review CT scan findings    Future Appointments   Date Time Provider Department Center   4/15/2025 10:30 AM Michael Rice DO [facility]   8/5/2025 10:20 AM Arden Dhaliwal MD AND [facility]

## 2025-04-08 NOTE — TELEPHONE ENCOUNTER
ED Follow Up Call/ Schedule appt   ED: [facility]  Reason: ABD pain  Date: 4/5/25    Appt scheduled:       Comments:     Left voicemail for patient to call the office back to schedule a ED follow up appointment.

## 2025-04-11 DIAGNOSIS — E11.65 TYPE 2 DIABETES MELLITUS WITH HYPERGLYCEMIA, WITHOUT LONG-TERM CURRENT USE OF INSULIN (HCC): Primary | ICD-10-CM

## 2025-04-14 RX ORDER — EMPAGLIFLOZIN 10 MG/1
10 TABLET, FILM COATED ORAL DAILY
Qty: 30 TABLET | Refills: 3 | Status: SHIPPED | OUTPATIENT
Start: 2025-04-14

## 2025-04-14 NOTE — TELEPHONE ENCOUNTER
Medication:   Requested Prescriptions     Pending Prescriptions Disp Refills    JARDIANCE 10 MG tablet [Pharmacy Med Name: JARDIANCE 10MG TABLETS] 30 tablet 3     Sig: TAKE 1 TABLET BY MOUTH DAILY         Last appt: 12/11/2024   Next appt: Visit date not found    Last Labs DM:   Lab Results   Component Value Date/Time    LABA1C 7.3 03/21/2025 03:13 PM     Last Lipid:   Lab Results   Component Value Date/Time    CHOL 136 09/10/2024 03:17 PM    TRIG 131 09/10/2024 03:17 PM    HDL 52 09/10/2024 03:17 PM   Last Thyroid:   Lab Results   Component Value Date/Time    TSH 3.71 05/02/2023 10:57 AM

## 2025-04-15 ENCOUNTER — OFFICE VISIT (OUTPATIENT)
Dept: FAMILY MEDICINE CLINIC | Age: 85
End: 2025-04-15
Payer: COMMERCIAL

## 2025-04-15 VITALS
SYSTOLIC BLOOD PRESSURE: 104 MMHG | BODY MASS INDEX: 34.21 KG/M2 | WEIGHT: 218 LBS | HEIGHT: 67 IN | DIASTOLIC BLOOD PRESSURE: 48 MMHG | OXYGEN SATURATION: 94 % | HEART RATE: 63 BPM

## 2025-04-15 DIAGNOSIS — I77.4 CELIAC ARTERY STENOSIS: ICD-10-CM

## 2025-04-15 DIAGNOSIS — K59.00 CONSTIPATION, UNSPECIFIED CONSTIPATION TYPE: ICD-10-CM

## 2025-04-15 DIAGNOSIS — R11.0 NAUSEA: ICD-10-CM

## 2025-04-15 DIAGNOSIS — R10.33 PERIUMBILICAL ABDOMINAL PAIN: Primary | ICD-10-CM

## 2025-04-15 PROCEDURE — 3074F SYST BP LT 130 MM HG: CPT | Performed by: FAMILY MEDICINE

## 2025-04-15 PROCEDURE — 1159F MED LIST DOCD IN RCRD: CPT | Performed by: FAMILY MEDICINE

## 2025-04-15 PROCEDURE — 1123F ACP DISCUSS/DSCN MKR DOCD: CPT | Performed by: FAMILY MEDICINE

## 2025-04-15 PROCEDURE — 3078F DIAST BP <80 MM HG: CPT | Performed by: FAMILY MEDICINE

## 2025-04-15 PROCEDURE — 99214 OFFICE O/P EST MOD 30 MIN: CPT | Performed by: FAMILY MEDICINE

## 2025-04-15 RX ORDER — ONDANSETRON 4 MG/1
4 TABLET, ORALLY DISINTEGRATING ORAL 3 TIMES DAILY PRN
Qty: 30 TABLET | Refills: 5 | Status: SHIPPED | OUTPATIENT
Start: 2025-04-15

## 2025-04-15 ASSESSMENT — ENCOUNTER SYMPTOMS: ABDOMINAL PAIN: 0

## 2025-04-15 NOTE — PROGRESS NOTES
(ALEVE) 220 MG CAPS Take by mouth As needed      Misc. Devices (CPAP MACHINE) MISC by Does not apply route      UNABLE TO FIND Tonic water      albuterol sulfate HFA (VENTOLIN HFA) 108 (90 Base) MCG/ACT inhaler Inhale 2 puffs into the lungs 4 times daily as needed for Wheezing 54 g 5    ezetimibe (ZETIA) 10 MG tablet Take 1 tablet by mouth daily 90 tablet 1    tacrolimus (PROTOPIC) 0.1 % ointment Apply to affected area in groin BID PRN for itch 60 g 5    miconazole (ZEASORB-AF) 2 % powder Apply to groin area twice a day 85 g 5    fluticasone-umeclidin-vilant (TRELEGY ELLIPTA) 100-62.5-25 MCG/ACT AEPB inhaler Inhale 1 puff into the lungs daily 1 each 5    SODIUM FLUORIDE 5000 PLUS 1.1 % CREA USE INSTEAD OF REGULAR TOOTHPASTE AND DO NOT RINSE      Cholecalciferol (VITAMIN D3) 1.25 MG (64554 UT) CAPS Take by mouth      latanoprost (XALATAN) 0.005 % ophthalmic solution USE 1 DROP IN BOTH EYES EVERY EVENING      azelastine (ASTELIN) 0.1 % nasal spray USE 2 SPRAYS IN EACH NOSTRIL TWICE DAILY AS DIRECTED 30 mL 3    Acetaminophen (TYLENOL) 325 MG CAPS Take 975 mg by mouth daily as needed      Multiple Vitamins-Minerals (MULTIVITAMIN ADULT PO) Take 1 tablet by mouth daily      Polyethylene Glycol 3350 (GLYCOLAX PO) Take 17 g by mouth daily as needed      aspirin 81 MG EC tablet Take 2 tablets by mouth daily      HYDROCODONE-ACETAMINOPHEN PO Take by mouth as needed       fluticasone (VERAMYST) 27.5 MCG/SPRAY nasal spray 2 sprays by Each Nostril route daily (Patient not taking: Reported on 4/15/2025)      glimepiride (AMARYL) 2 MG tablet Take 1 tablet by mouth every morning (before breakfast) (Patient not taking: Reported on 2/5/2025) 90 tablet 1    Continuous Glucose Sensor (FREESTYLE JONO 3 PLUS SENSOR) MISC 1 each by Does not apply route every 14 days 2 each 5    colesevelam (WELCHOL) 625 MG tablet Take 1 tablet by mouth 2 times daily (Patient not taking: Reported on 4/15/2025)      ipratropium (ATROVENT) 0.03 % nasal

## 2025-04-22 ENCOUNTER — OFFICE VISIT (OUTPATIENT)
Dept: ENDOCRINOLOGY | Age: 85
End: 2025-04-22
Payer: COMMERCIAL

## 2025-04-22 VITALS
DIASTOLIC BLOOD PRESSURE: 58 MMHG | HEIGHT: 67 IN | HEART RATE: 58 BPM | BODY MASS INDEX: 33.74 KG/M2 | WEIGHT: 215 LBS | SYSTOLIC BLOOD PRESSURE: 117 MMHG

## 2025-04-22 DIAGNOSIS — E11.65 TYPE 2 DIABETES MELLITUS WITH HYPERGLYCEMIA, WITHOUT LONG-TERM CURRENT USE OF INSULIN (HCC): ICD-10-CM

## 2025-04-22 PROCEDURE — 99214 OFFICE O/P EST MOD 30 MIN: CPT | Performed by: INTERNAL MEDICINE

## 2025-04-22 PROCEDURE — 1159F MED LIST DOCD IN RCRD: CPT | Performed by: INTERNAL MEDICINE

## 2025-04-22 PROCEDURE — 3078F DIAST BP <80 MM HG: CPT | Performed by: INTERNAL MEDICINE

## 2025-04-22 PROCEDURE — 3051F HG A1C>EQUAL 7.0%<8.0%: CPT | Performed by: INTERNAL MEDICINE

## 2025-04-22 PROCEDURE — 95251 CONT GLUC MNTR ANALYSIS I&R: CPT | Performed by: INTERNAL MEDICINE

## 2025-04-22 PROCEDURE — 1123F ACP DISCUSS/DSCN MKR DOCD: CPT | Performed by: INTERNAL MEDICINE

## 2025-04-22 PROCEDURE — 3074F SYST BP LT 130 MM HG: CPT | Performed by: INTERNAL MEDICINE

## 2025-04-22 RX ORDER — HYDROCHLOROTHIAZIDE 12.5 MG/1
1 CAPSULE ORAL
Qty: 2 EACH | Refills: 5 | Status: SHIPPED | OUTPATIENT
Start: 2025-04-22

## 2025-04-22 NOTE — PROGRESS NOTES
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 Patient also has generalized anxiety disorder, ANDRE, coronary artery disease post CABG from 2008 status post multiple PCI's, aortic regurgitation, hypertension, hyperlipidemia, history of lung cancer and has a BMI of 33.     Diagnosed: initially diagnosed with prediabetes around 2018 which progressed into diabetes.   Initial medications: metformin. He was previously able to maintain with an A1c below 7%. A1c did increase to 7.2% from 11/20/2023.      Other diabetes meds tried in the past: Rybelsus stopped due to abdominal pain.  Hx of severe hypoglycemia or DKA: none  Hx of MI/stroke/CKD: has CAD, no stroke or CKD.   Hx of pancreatitis: none   Family hx of thyroid cancer: None     Most recent HbA1c:  Hemoglobin A1C   Date Value Ref Range Status   03/21/2025 7.3 % Final      Current regimen:  Metformin 1000 mg twice daily  Jardiance 10 mg daily    Blood sugar ranges: Review of CGM report over the past 2 weeks shows TIR of 90%  Average of 142, hyperglycemia noted after meals.  No hypoglycemia.    Meals: He usually grazes throughout the day. He snacks due to boredom.  Exercise: limited exercise   Last eye exam: follows at [facility] and a cataract specialist. Last in early 2024. No DR. He underwent left cataract surgery May 2024. Right eye was completed in the summer.     Foot care: reports some trouble with imbalance. No numbness or tingling.   Hyperlipidemia: Currently on Zetia, colesevelam and Lovaza as well as rosuvastatin 40 mg daily.  Lipid panel from December 2023 showed an LDL of 59, HDL 51, triglycerides 79.    He has history of coronary artery disease status post CABG and multiple stents.  He is on ranolazine.  He has hypertension on lisinopril 40 mg daily as well as metoprolol.    Patient

## 2025-05-01 ENCOUNTER — HOSPITAL ENCOUNTER (OUTPATIENT)
Dept: CT IMAGING | Age: 85
Discharge: HOME | End: 2025-05-01
Attending: INTERNAL MEDICINE
Payer: COMMERCIAL

## 2025-05-01 DIAGNOSIS — C34.11 MALIGNANT NEOPLASM OF UPPER LOBE, RIGHT BRONCHUS OR LUNG (HCC): ICD-10-CM

## 2025-05-01 PROCEDURE — 71260 CT THORAX DX C+: CPT

## 2025-05-01 PROCEDURE — 6360000004 HC RX CONTRAST MEDICATION: Performed by: INTERNAL MEDICINE

## 2025-05-01 PROCEDURE — 2500000003 HC RX 250 WO HCPCS: Performed by: INTERNAL MEDICINE

## 2025-05-01 RX ORDER — IOPAMIDOL 755 MG/ML
75 INJECTION, SOLUTION INTRAVASCULAR
Status: COMPLETED | OUTPATIENT
Start: 2025-05-01 | End: 2025-05-01

## 2025-05-01 RX ADMIN — BARIUM SULFATE 900 ML: 20 SUSPENSION ORAL at 16:32

## 2025-05-01 RX ADMIN — IOPAMIDOL 75 ML: 755 INJECTION, SOLUTION INTRAVENOUS at 16:33

## 2025-05-09 DIAGNOSIS — I25.810 CORONARY ARTERY DISEASE INVOLVING CORONARY BYPASS GRAFT OF NATIVE HEART WITHOUT ANGINA PECTORIS: ICD-10-CM

## 2025-05-09 DIAGNOSIS — I10 ESSENTIAL HYPERTENSION: ICD-10-CM

## 2025-05-09 RX ORDER — METOPROLOL SUCCINATE 50 MG/1
50 TABLET, EXTENDED RELEASE ORAL 2 TIMES DAILY
Qty: 60 TABLET | Refills: 0 | Status: SHIPPED | OUTPATIENT
Start: 2025-05-09

## 2025-05-09 NOTE — TELEPHONE ENCOUNTER
Refill Request     CONFIRM preferred pharmacy with the patient.    If Mail Order Rx - Pend for 90 day refill.      Last Seen: Last Seen Department: 4/15/2025  Last Seen by PCP: 4/15/2025    Last Written: 10/11/24 #60 - 5 refill     If no future appointment scheduled:  Review the last OV with PCP and review information for follow-up visit,  Route STAFF MESSAGE with patient name to the  Pool for scheduling with the following information:            -  Timing of next visit           -  Visit type ie Physical, OV, etc           -  Diagnoses/Reason ie. COPD, HTN - Do not use MEDICATION, Follow-up or CHECK UP - Give reason for visit      Next Appointment:   Future Appointments   Date Time Provider Department Center   8/5/2025 10:20 AM Arden Dhaliwal MD AND PULM [facility]   8/26/2025 10:00 AM Kasandra Duncan MD AND ENDO [facility]       Message sent to  to schedule appt with patient?  NO      Requested Prescriptions     Pending Prescriptions Disp Refills    metoprolol succinate (TOPROL XL) 50 MG extended release tablet 60 tablet 5     Sig: Take 1 tablet by mouth 2 times daily

## 2025-05-22 ENCOUNTER — TELEPHONE (OUTPATIENT)
Dept: FAMILY MEDICINE CLINIC | Age: 85
End: 2025-05-22

## 2025-05-22 DIAGNOSIS — M25.511 RIGHT SHOULDER PAIN, UNSPECIFIED CHRONICITY: Primary | ICD-10-CM

## 2025-05-22 NOTE — TELEPHONE ENCOUNTER
Patient would like to know if he can get a referral for physical therapy for his right rotator cuff. He prefers [place]. The fax for [employer]  is [phone].

## 2025-05-30 DIAGNOSIS — I10 ESSENTIAL HYPERTENSION: ICD-10-CM

## 2025-05-30 DIAGNOSIS — I25.810 CORONARY ARTERY DISEASE INVOLVING CORONARY BYPASS GRAFT OF NATIVE HEART WITHOUT ANGINA PECTORIS: ICD-10-CM

## 2025-05-30 NOTE — TELEPHONE ENCOUNTER
Refill Request     CONFIRM preferred pharmacy with the patient.    If Mail Order Rx - Pend for 90 day refill.      Last Seen: Last Seen Department: 4/15/2025  Last Seen by PCP: 4/15/2025    Last Written: 5/9/25 #60 no refills     If no future appointment scheduled:  Review the last OV with PCP and review information for follow-up visit,  Route STAFF MESSAGE with patient name to the  Pool for scheduling with the following information:            -  Timing of next visit           -  Visit type ie Physical, OV, etc           -  Diagnoses/Reason ie. COPD, HTN - Do not use MEDICATION, Follow-up or CHECK UP - Give reason for visit      Next Appointment:   Future Appointments   Date Time Provider Department Center   8/5/2025 10:20 AM Arden Dhaliwal MD AND [facility]   8/26/2025 10:00 AM Kasandra Duncan MD AND [facility]O [facility]       Message sent to  to schedule appt with patient?  NO-When is the patient due back to be seen?      Requested Prescriptions     Pending Prescriptions Disp Refills    metoprolol succinate (TOPROL XL) 50 MG extended release tablet 60 tablet 0     Sig: Take 1 tablet by mouth 2 times daily

## 2025-06-02 RX ORDER — METOPROLOL SUCCINATE 50 MG/1
50 TABLET, EXTENDED RELEASE ORAL 2 TIMES DAILY
Qty: 60 TABLET | Refills: 0 | Status: SHIPPED | OUTPATIENT
Start: 2025-06-02

## 2025-06-15 DIAGNOSIS — K21.9 GASTROESOPHAGEAL REFLUX DISEASE, UNSPECIFIED WHETHER ESOPHAGITIS PRESENT: ICD-10-CM

## 2025-06-16 RX ORDER — MECOBALAMIN 5000 MCG
15 TABLET,DISINTEGRATING ORAL DAILY
Qty: 90 CAPSULE | Refills: 3 | Status: SHIPPED | OUTPATIENT
Start: 2025-06-16

## 2025-06-16 NOTE — TELEPHONE ENCOUNTER
.Refill Request     CONFIRM preferred pharmacy with the patient.    If Mail Order Rx - Pend for 90 day refill.      Last Seen: Last Seen Department: 4/15/2025  Last Seen by PCP: 4/15/2025    Last Written: 9-12-24 90 with 1    If no future appointment scheduled:  Review the last OV with PCP and review information for follow-up visit,  Route STAFF MESSAGE with patient name to the  Pool for scheduling with the following information:            -  Timing of next visit           -  Visit type ie Physical, OV, etc           -  Diagnoses/Reason ie. COPD, HTN - Do not use MEDICATION, Follow-up or CHECK UP - Give reason for visit      Next Appointment:   Future Appointments   Date Time Provider Department Center   8/5/2025 10:20 AM Arden Dhaliwal MD AND PUL [facility]   8/26/2025 10:00 AM Kasandra Duncan MD AND [facility]       Message sent to  to schedule appt with patient?  NO      Requested Prescriptions     Pending Prescriptions Disp Refills    lansoprazole (PREVACID) 15 MG delayed release capsule [Pharmacy Med Name: LANSOPRAZOLE 15MG DR CAPSULES] 90 capsule 1     Sig: TAKE 1 CAPSULE BY MOUTH DAILY

## 2025-06-19 ENCOUNTER — TELEPHONE (OUTPATIENT)
Dept: FAMILY MEDICINE CLINIC | Age: 85
End: 2025-06-19

## 2025-06-20 NOTE — TELEPHONE ENCOUNTER
Submitted PA for Lansoprazole 15MG dr capsules   Via CMM Key: [id number]  STATUS: Your PA request has been approved. Additional information will be provided in the approval communication. (Message 4637)  Effective Date: 5/21/2025  Authorization Expiration Date: 6/20/2026    Please notify patient. Thank you.

## 2025-06-29 DIAGNOSIS — I10 ESSENTIAL HYPERTENSION: ICD-10-CM

## 2025-06-29 DIAGNOSIS — I25.810 CORONARY ARTERY DISEASE INVOLVING CORONARY BYPASS GRAFT OF NATIVE HEART WITHOUT ANGINA PECTORIS: ICD-10-CM

## 2025-06-30 RX ORDER — METOPROLOL SUCCINATE 50 MG/1
50 TABLET, EXTENDED RELEASE ORAL 2 TIMES DAILY
Qty: 180 TABLET | Refills: 1 | Status: SHIPPED | OUTPATIENT
Start: 2025-06-30

## 2025-06-30 NOTE — TELEPHONE ENCOUNTER
.Refill Request     CONFIRM preferred pharmacy with the patient.    If Mail Order Rx - Pend for 90 day refill.      Last Seen: Last Seen Department: 4/15/2025  Last Seen by PCP: Visit date not found    Last Written: 6-2-25 60 with 0     If no future appointment scheduled:  Review the last OV with PCP and review information for follow-up visit,  Route STAFF MESSAGE with patient name to the  Pool for scheduling with the following information:            -  Timing of next visit           -  Visit type ie Physical, OV, etc           -  Diagnoses/Reason ie. COPD, HTN - Do not use MEDICATION, Follow-up or CHECK UP - Give reason for visit      Next Appointment:   Future Appointments   Date Time Provider Department Center   8/5/2025 10:20 AM Arden Dhaliwal MD [facility]   8/26/2025 10:00 AM Kasandra Duncan MD [facility]       Message sent to  to schedule appt with patient?  NO      Requested Prescriptions     Pending Prescriptions Disp Refills    metoprolol succinate (TOPROL XL) 50 MG extended release tablet [Pharmacy Med Name: METOPROLOL ER SUCCINATE 50MG TABS] 60 tablet 0     Sig: TAKE 1 TABLET BY MOUTH TWICE DAILY

## 2025-07-01 ENCOUNTER — OFFICE VISIT (OUTPATIENT)
Dept: FAMILY MEDICINE CLINIC | Age: 85
End: 2025-07-01
Payer: COMMERCIAL

## 2025-07-01 VITALS
DIASTOLIC BLOOD PRESSURE: 46 MMHG | OXYGEN SATURATION: 97 % | HEIGHT: 67 IN | SYSTOLIC BLOOD PRESSURE: 88 MMHG | BODY MASS INDEX: 33.74 KG/M2 | HEART RATE: 61 BPM | WEIGHT: 215 LBS

## 2025-07-01 DIAGNOSIS — R22.42 LOCALIZED SWELLING OF LEFT FOOT: Primary | ICD-10-CM

## 2025-07-01 DIAGNOSIS — E11.65 TYPE 2 DIABETES MELLITUS WITH HYPERGLYCEMIA, WITHOUT LONG-TERM CURRENT USE OF INSULIN (HCC): ICD-10-CM

## 2025-07-01 PROCEDURE — 99214 OFFICE O/P EST MOD 30 MIN: CPT | Performed by: FAMILY MEDICINE

## 2025-07-01 PROCEDURE — 1159F MED LIST DOCD IN RCRD: CPT | Performed by: FAMILY MEDICINE

## 2025-07-01 PROCEDURE — 1123F ACP DISCUSS/DSCN MKR DOCD: CPT | Performed by: FAMILY MEDICINE

## 2025-07-01 PROCEDURE — 3078F DIAST BP <80 MM HG: CPT | Performed by: FAMILY MEDICINE

## 2025-07-01 PROCEDURE — 3051F HG A1C>EQUAL 7.0%<8.0%: CPT | Performed by: FAMILY MEDICINE

## 2025-07-01 PROCEDURE — 3074F SYST BP LT 130 MM HG: CPT | Performed by: FAMILY MEDICINE

## 2025-07-01 RX ORDER — FUROSEMIDE 20 MG/1
20 TABLET ORAL DAILY PRN
Qty: 10 TABLET | Refills: 0 | Status: SHIPPED | OUTPATIENT
Start: 2025-07-01

## 2025-07-01 NOTE — PROGRESS NOTES
Bassam Lin is a 85 y.o. male    Chief Complaint   Patient presents with    Foot Swelling     Few days left foot       HPI:    HPI  History of Present Illness  The patient is an 85-year-old male who presents for evaluation of a swollen left foot.    He has been experiencing swelling in his left foot for the past few days, which typically subsides overnight. The swelling is bilateral but more pronounced on the left side. He has undergone echocardiograms and other tests, which suggested venous insufficiency. However, he is concerned about the current episode due to its unilateral nature. He reports no pain in the calf area. He also reports no fevers or chills. He does not experience lightheadedness or dizziness. He consumes a high amount of salt. He was prescribed Lasix 4 to 5 years ago but did not take it. He is currently on clopidogrel for coronary artery disease.    Two days ago, he experienced severe cramps in his legs, which are a regular occurrence during the week. These cramps are sometimes alleviated by quinine water. During this episode, he heard a snapping sound in his ankle, leading him to believe he may have injured his foot. He experiences pain when walking or standing up, and the swelling does not seem to reduce, which is causing him concern. He recalls that the swelling started before he heard the snapping sound in his ankle.    His last consultation with an electrophysiologist was 2 months ago, who found no issues. He has a history of diabetes and is concerned about the fullness in his feet.    He had his shoulder taken care of at the [facility]. He had lung cancer.      ROS: See HPI.    BP (!) 88/46 (BP Site: Right Upper Arm, Patient Position: Sitting, BP Cuff Size: Medium Adult)   Pulse 61   Ht 1.702 m (5' 7.01\")   Wt 97.5 kg (215 lb)   SpO2 97%   BMI 33.67 kg/m²     Physical Exam:    Physical Exam  Constitutional:       General: He is not in acute distress.     Appearance: Normal

## 2025-07-07 ENCOUNTER — PATIENT MESSAGE (OUTPATIENT)
Dept: FAMILY MEDICINE CLINIC | Age: 85
End: 2025-07-07

## 2025-07-07 DIAGNOSIS — M25.511 RIGHT SHOULDER PAIN, UNSPECIFIED CHRONICITY: Primary | ICD-10-CM

## 2025-07-14 ENCOUNTER — HOSPITAL ENCOUNTER (OUTPATIENT)
Dept: PHYSICAL THERAPY | Age: 85
Setting detail: THERAPIES SERIES
Discharge: HOME | End: 2025-07-14
Payer: MEDICARE

## 2025-07-14 DIAGNOSIS — R29.3: ICD-10-CM

## 2025-07-14 DIAGNOSIS — G89.29 CHRONIC RIGHT SHOULDER PAIN: Primary | ICD-10-CM

## 2025-07-14 DIAGNOSIS — M25.511 CHRONIC RIGHT SHOULDER PAIN: Primary | ICD-10-CM

## 2025-07-14 PROCEDURE — 97162 PT EVAL MOD COMPLEX 30 MIN: CPT

## 2025-07-14 PROCEDURE — 97110 THERAPEUTIC EXERCISES: CPT

## 2025-07-14 PROCEDURE — 97530 THERAPEUTIC ACTIVITIES: CPT

## 2025-07-18 ENCOUNTER — TELEPHONE (OUTPATIENT)
Dept: ORTHOPEDIC SURGERY | Age: 85
End: 2025-07-18

## 2025-07-21 ENCOUNTER — APPOINTMENT (OUTPATIENT)
Dept: PHYSICAL THERAPY | Age: 85
End: 2025-07-21
Payer: MEDICARE

## 2025-07-23 NOTE — TELEPHONE ENCOUNTER
Called and spoke with patient.  Patient states he saw that Dr. Quarles treats [facility] team and wanted his opinion on who he should see for his ankle. Explained that due to fact Dr. Quarles has not treated him in the past for ankle giving specific referral is difficult as there are several physicians we might refer too.  Recommended patient call his PCP for specific referral.  He asked if [facility] has physicians who specialize in ankle.  Again explained hard to give specific physician name without knowing patient issue. Recommended he call [phone] to schedule and could try to be scheduled with Dr. Martins or Lorenzo but depending on condition one of them may not be correct.  Patient understood and was going to call to schedule.

## 2025-07-24 ENCOUNTER — APPOINTMENT (OUTPATIENT)
Dept: PHYSICAL THERAPY | Age: 85
End: 2025-07-24
Payer: MEDICARE

## 2025-08-07 ENCOUNTER — OFFICE VISIT (OUTPATIENT)
Dept: PULMONOLOGY | Age: 85
End: 2025-08-07
Payer: COMMERCIAL

## 2025-08-07 VITALS
RESPIRATION RATE: 16 BRPM | DIASTOLIC BLOOD PRESSURE: 65 MMHG | BODY MASS INDEX: 33.9 KG/M2 | TEMPERATURE: 97.6 F | HEIGHT: 67 IN | OXYGEN SATURATION: 93 % | SYSTOLIC BLOOD PRESSURE: 104 MMHG | WEIGHT: 216 LBS | HEART RATE: 50 BPM

## 2025-08-07 DIAGNOSIS — R06.02 SOB (SHORTNESS OF BREATH): Primary | ICD-10-CM

## 2025-08-07 DIAGNOSIS — Z85.118 HISTORY OF LUNG CANCER: ICD-10-CM

## 2025-08-07 DIAGNOSIS — G47.33 OSA (OBSTRUCTIVE SLEEP APNEA): ICD-10-CM

## 2025-08-07 PROCEDURE — 3078F DIAST BP <80 MM HG: CPT | Performed by: STUDENT IN AN ORGANIZED HEALTH CARE EDUCATION/TRAINING PROGRAM

## 2025-08-07 PROCEDURE — 3074F SYST BP LT 130 MM HG: CPT | Performed by: STUDENT IN AN ORGANIZED HEALTH CARE EDUCATION/TRAINING PROGRAM

## 2025-08-07 PROCEDURE — 99213 OFFICE O/P EST LOW 20 MIN: CPT | Performed by: STUDENT IN AN ORGANIZED HEALTH CARE EDUCATION/TRAINING PROGRAM

## 2025-08-07 PROCEDURE — G2211 COMPLEX E/M VISIT ADD ON: HCPCS | Performed by: STUDENT IN AN ORGANIZED HEALTH CARE EDUCATION/TRAINING PROGRAM

## 2025-08-07 PROCEDURE — 1123F ACP DISCUSS/DSCN MKR DOCD: CPT | Performed by: STUDENT IN AN ORGANIZED HEALTH CARE EDUCATION/TRAINING PROGRAM

## 2025-08-07 PROCEDURE — 1159F MED LIST DOCD IN RCRD: CPT | Performed by: STUDENT IN AN ORGANIZED HEALTH CARE EDUCATION/TRAINING PROGRAM

## 2025-08-07 RX ORDER — FLUTICASONE FUROATE, UMECLIDINIUM BROMIDE AND VILANTEROL TRIFENATATE 100; 62.5; 25 UG/1; UG/1; UG/1
1 POWDER RESPIRATORY (INHALATION) DAILY
Qty: 3 EACH | Refills: 0 | Status: SHIPPED | COMMUNITY
Start: 2025-08-07

## 2025-08-07 ASSESSMENT — ENCOUNTER SYMPTOMS
DIARRHEA: 0
EYE PAIN: 0
VOMITING: 0
ABDOMINAL PAIN: 0
EYE ITCHING: 0
BACK PAIN: 0
NAUSEA: 0
SHORTNESS OF BREATH: 0
CONSTIPATION: 0
COLOR CHANGE: 0
EYE REDNESS: 0
WHEEZING: 0
TROUBLE SWALLOWING: 0
ABDOMINAL DISTENTION: 0
COUGH: 0
STRIDOR: 0
SORE THROAT: 0
EYE DISCHARGE: 0

## 2025-08-09 DIAGNOSIS — E11.65 TYPE 2 DIABETES MELLITUS WITH HYPERGLYCEMIA, WITHOUT LONG-TERM CURRENT USE OF INSULIN (HCC): ICD-10-CM

## 2025-08-09 DIAGNOSIS — E78.2 MIXED HYPERLIPIDEMIA: ICD-10-CM

## 2025-08-11 DIAGNOSIS — E11.65 TYPE 2 DIABETES MELLITUS WITH HYPERGLYCEMIA, WITHOUT LONG-TERM CURRENT USE OF INSULIN (HCC): ICD-10-CM

## 2025-08-11 RX ORDER — EZETIMIBE 10 MG/1
10 TABLET ORAL DAILY
Qty: 90 TABLET | Refills: 3 | Status: SHIPPED | OUTPATIENT
Start: 2025-08-11

## 2025-08-11 RX ORDER — ROSUVASTATIN CALCIUM 40 MG/1
40 TABLET, COATED ORAL EVERY EVENING
Qty: 90 TABLET | Refills: 3 | Status: SHIPPED | OUTPATIENT
Start: 2025-08-11

## 2025-08-12 RX ORDER — HYDROCHLOROTHIAZIDE 12.5 MG/1
1 CAPSULE ORAL
Qty: 2 EACH | Refills: 0 | Status: SHIPPED | OUTPATIENT
Start: 2025-08-12

## 2025-08-25 ENCOUNTER — HOSPITAL ENCOUNTER (OUTPATIENT)
Dept: CT IMAGING | Age: 85
Discharge: HOME | End: 2025-08-25
Attending: INTERNAL MEDICINE
Payer: COMMERCIAL

## 2025-08-25 DIAGNOSIS — C34.11 MALIGNANT NEOPLASM OF UPPER LOBE, RIGHT BRONCHUS OR LUNG (HCC): ICD-10-CM

## 2025-08-25 LAB
PERFORMED ON: NORMAL
POC CREATININE: 1 MG/DL (ref 0.8–1.3)
POC SAMPLE TYPE: NORMAL

## 2025-08-25 PROCEDURE — 74177 CT ABD & PELVIS W/CONTRAST: CPT

## 2025-08-25 PROCEDURE — 6360000004 HC RX CONTRAST MEDICATION: Performed by: INTERNAL MEDICINE

## 2025-08-25 PROCEDURE — 82565 ASSAY OF CREATININE: CPT

## 2025-08-25 PROCEDURE — 2500000003 HC RX 250 WO HCPCS: Performed by: INTERNAL MEDICINE

## 2025-08-25 RX ORDER — IOPAMIDOL 755 MG/ML
75 INJECTION, SOLUTION INTRAVASCULAR
Status: COMPLETED | OUTPATIENT
Start: 2025-08-25 | End: 2025-08-25

## 2025-08-25 RX ADMIN — IOPAMIDOL 75 ML: 755 INJECTION, SOLUTION INTRAVENOUS at 10:59

## 2025-08-25 RX ADMIN — BARIUM SULFATE 900 ML: 20 SUSPENSION ORAL at 11:00

## 2025-08-26 ENCOUNTER — OFFICE VISIT (OUTPATIENT)
Dept: ENDOCRINOLOGY | Age: 85
End: 2025-08-26
Payer: COMMERCIAL

## 2025-08-26 VITALS — HEIGHT: 67 IN | BODY MASS INDEX: 34.21 KG/M2 | WEIGHT: 218 LBS

## 2025-08-26 DIAGNOSIS — E11.65 TYPE 2 DIABETES MELLITUS WITH HYPERGLYCEMIA, WITHOUT LONG-TERM CURRENT USE OF INSULIN (HCC): Primary | ICD-10-CM

## 2025-08-26 DIAGNOSIS — E78.2 MIXED HYPERLIPIDEMIA: ICD-10-CM

## 2025-08-26 LAB — HBA1C MFR BLD: 6.6 %

## 2025-08-26 PROCEDURE — G2211 COMPLEX E/M VISIT ADD ON: HCPCS | Performed by: INTERNAL MEDICINE

## 2025-08-26 PROCEDURE — 1159F MED LIST DOCD IN RCRD: CPT | Performed by: INTERNAL MEDICINE

## 2025-08-26 PROCEDURE — 99214 OFFICE O/P EST MOD 30 MIN: CPT | Performed by: INTERNAL MEDICINE

## 2025-08-26 PROCEDURE — 3044F HG A1C LEVEL LT 7.0%: CPT | Performed by: INTERNAL MEDICINE

## 2025-08-26 PROCEDURE — 1123F ACP DISCUSS/DSCN MKR DOCD: CPT | Performed by: INTERNAL MEDICINE

## 2025-08-26 PROCEDURE — 83036 HEMOGLOBIN GLYCOSYLATED A1C: CPT | Performed by: INTERNAL MEDICINE

## 2025-08-26 RX ORDER — METFORMIN HYDROCHLORIDE 500 MG/1
1000 TABLET, EXTENDED RELEASE ORAL
Qty: 360 TABLET | Refills: 1 | Status: SHIPPED | OUTPATIENT
Start: 2025-08-26

## 2025-08-26 RX ORDER — HYDROCHLOROTHIAZIDE 12.5 MG/1
1 CAPSULE ORAL
Qty: 2 EACH | Refills: 3 | Status: SHIPPED | OUTPATIENT
Start: 2025-08-26